# Patient Record
Sex: FEMALE | Race: WHITE | NOT HISPANIC OR LATINO | Employment: FULL TIME | URBAN - METROPOLITAN AREA
[De-identification: names, ages, dates, MRNs, and addresses within clinical notes are randomized per-mention and may not be internally consistent; named-entity substitution may affect disease eponyms.]

---

## 2017-01-17 ENCOUNTER — LAB VISIT (OUTPATIENT)
Dept: LAB | Facility: HOSPITAL | Age: 47
End: 2017-01-17
Attending: INTERNAL MEDICINE
Payer: COMMERCIAL

## 2017-01-17 DIAGNOSIS — D64.9 ANEMIA OF UNKNOWN ETIOLOGY: ICD-10-CM

## 2017-01-17 LAB
BASOPHILS # BLD AUTO: 0.02 K/UL
BASOPHILS NFR BLD: 0.3 %
DIFFERENTIAL METHOD: ABNORMAL
EOSINOPHIL # BLD AUTO: 0.3 K/UL
EOSINOPHIL NFR BLD: 4 %
ERYTHROCYTE [DISTWIDTH] IN BLOOD BY AUTOMATED COUNT: 13.4 %
FERRITIN SERPL-MCNC: 60 NG/ML
HCT VFR BLD AUTO: 35.8 %
HGB BLD-MCNC: 12.4 G/DL
IRON SERPL-MCNC: 67 UG/DL
LYMPHOCYTES # BLD AUTO: 2 K/UL
LYMPHOCYTES NFR BLD: 28.7 %
MCH RBC QN AUTO: 30 PG
MCHC RBC AUTO-ENTMCNC: 34.6 %
MCV RBC AUTO: 87 FL
MONOCYTES # BLD AUTO: 0.6 K/UL
MONOCYTES NFR BLD: 8 %
NEUTROPHILS # BLD AUTO: 4.1 K/UL
NEUTROPHILS NFR BLD: 59 %
PLATELET # BLD AUTO: 229 K/UL
PMV BLD AUTO: 11 FL
RBC # BLD AUTO: 4.13 M/UL
SATURATED IRON: 20 %
TOTAL IRON BINDING CAPACITY: 340 UG/DL
TRANSFERRIN SERPL-MCNC: 230 MG/DL
WBC # BLD AUTO: 7 K/UL

## 2017-01-17 PROCEDURE — 83540 ASSAY OF IRON: CPT

## 2017-01-17 PROCEDURE — 82728 ASSAY OF FERRITIN: CPT

## 2017-01-17 PROCEDURE — 85025 COMPLETE CBC W/AUTO DIFF WBC: CPT

## 2017-01-17 PROCEDURE — 84466 ASSAY OF TRANSFERRIN: CPT

## 2017-01-17 PROCEDURE — 36415 COLL VENOUS BLD VENIPUNCTURE: CPT

## 2017-01-18 ENCOUNTER — TELEPHONE (OUTPATIENT)
Dept: INTERNAL MEDICINE | Facility: CLINIC | Age: 47
End: 2017-01-18

## 2017-01-18 DIAGNOSIS — Z12.10 SPECIAL SCREENING FOR MALIGNANT NEOPLASMS, UNSPECIFIED INTESTINE: Primary | ICD-10-CM

## 2017-01-20 ENCOUNTER — PATIENT MESSAGE (OUTPATIENT)
Dept: INTERNAL MEDICINE | Facility: CLINIC | Age: 47
End: 2017-01-20

## 2017-02-01 ENCOUNTER — PATIENT MESSAGE (OUTPATIENT)
Dept: OPTOMETRY | Facility: CLINIC | Age: 47
End: 2017-02-01

## 2017-02-27 ENCOUNTER — TELEPHONE (OUTPATIENT)
Dept: OPHTHALMOLOGY | Facility: CLINIC | Age: 47
End: 2017-02-27

## 2017-02-27 NOTE — TELEPHONE ENCOUNTER
Called pt regarding sample contact lens left in  the optical shop, Answering machine  left message regarding,  can come in to .

## 2017-03-07 ENCOUNTER — PATIENT MESSAGE (OUTPATIENT)
Dept: INTERNAL MEDICINE | Facility: CLINIC | Age: 47
End: 2017-03-07

## 2017-03-08 ENCOUNTER — TELEPHONE (OUTPATIENT)
Dept: INTERNAL MEDICINE | Facility: CLINIC | Age: 47
End: 2017-03-08

## 2017-03-08 DIAGNOSIS — N60.02 CYST OF LEFT NIPPLE: Primary | ICD-10-CM

## 2017-03-15 ENCOUNTER — HOSPITAL ENCOUNTER (OUTPATIENT)
Dept: RADIOLOGY | Facility: HOSPITAL | Age: 47
Discharge: HOME | End: 2017-03-15
Attending: INTERNAL MEDICINE
Payer: COMMERCIAL

## 2017-03-15 DIAGNOSIS — N60.02 CYST OF LEFT NIPPLE: ICD-10-CM

## 2017-03-15 PROCEDURE — 76642 ULTRASOUND BREAST LIMITED: CPT | Mod: TC,LT

## 2017-03-15 PROCEDURE — 77061 BREAST TOMOSYNTHESIS UNI: CPT | Mod: 26,,, | Performed by: RADIOLOGY

## 2017-03-15 PROCEDURE — 77065 DX MAMMO INCL CAD UNI: CPT | Mod: 26,,, | Performed by: RADIOLOGY

## 2017-03-15 PROCEDURE — 76642 ULTRASOUND BREAST LIMITED: CPT | Mod: 26,LT,, | Performed by: RADIOLOGY

## 2017-03-15 PROCEDURE — 77061 BREAST TOMOSYNTHESIS UNI: CPT | Mod: TC,LT

## 2017-09-19 ENCOUNTER — TELEPHONE (OUTPATIENT)
Dept: INTERNAL MEDICINE | Facility: CLINIC | Age: 47
End: 2017-09-19

## 2017-09-19 DIAGNOSIS — I10 ESSENTIAL HYPERTENSION: Primary | ICD-10-CM

## 2017-09-19 NOTE — TELEPHONE ENCOUNTER
Please contact Dr. Nelson and set up lab  Appointment. Give her list of tests ordered and ask if she needs anything else.

## 2017-09-19 NOTE — TELEPHONE ENCOUNTER
----- Message from Nori Grove MA sent at 9/19/2017 10:13 AM CDT -----  Patient has an appt coming up and would like for you to put in lab orders and mammogram---

## 2017-09-20 ENCOUNTER — PATIENT MESSAGE (OUTPATIENT)
Dept: INTERNAL MEDICINE | Facility: CLINIC | Age: 47
End: 2017-09-20

## 2017-09-20 ENCOUNTER — TELEPHONE (OUTPATIENT)
Dept: INTERNAL MEDICINE | Facility: CLINIC | Age: 47
End: 2017-09-20

## 2017-09-20 ENCOUNTER — DOCUMENTATION ONLY (OUTPATIENT)
Dept: INTERNAL MEDICINE | Facility: CLINIC | Age: 47
End: 2017-09-20

## 2017-09-20 DIAGNOSIS — E03.8 SUBCLINICAL HYPOTHYROIDISM: Primary | ICD-10-CM

## 2017-09-20 NOTE — PROGRESS NOTES
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     At last visit, 11/29/16, BP was 128/76 on Coreg 3.125 mg tablet BID. She has been intolerant of Amlodipine 2.5 mg/day (headache), Lisinopril (cough), Losartan (fatigue), and Dyazide (37.5-25 mg) 1/4 tablet daily (excessive urination and dehydration).    She last saw her Gynecologist 10/15/15 at which time Apri was discontinued and IUD placed. She removed IUD accidentally and is no longer using birth control.  had Vasectomy in July, 2016.        PMH:      1. HTN             Coreg 3.125 mg BID           Intolerant of Amlodipine, Lisinopril, Losartan, and Dyazide    2. (B) Retinal Lattice Degeneration              Followed by Dr. Zazueta    4. Depression              Lexapro 10 mg - 1/2 tablet/day      5. Family Hx of Breast CA              Mother dx at 68 y/o - living              Mammogram 9/26/14 - normal      6. Hx of Alopecia Areata             Childhood possibly secondary to CMV - no recurrence     7. 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 Westbrook) - 10/2015          Mammogram - 10/2015 - negative           Immunizations:                Flu Vaccine - Fall 2016                  Td - 2005           Other Lab:                  TPO Antibodies (5/2012) - negative                  CRP (5/2012) - normal                  Quantitative Gold TB (8/2012): negative                  Mg (7/2015): normal                Ferritin (1/2017): 60                Sat'd Fe (1/2017): 20    IMP:  1. Annual PE  2. HTN   3. Depression  4. (B) Retinal Lattice Degeneration

## 2017-10-05 ENCOUNTER — LAB VISIT (OUTPATIENT)
Dept: LAB | Facility: HOSPITAL | Age: 47
End: 2017-10-05
Attending: INTERNAL MEDICINE
Payer: COMMERCIAL

## 2017-10-05 ENCOUNTER — TELEPHONE (OUTPATIENT)
Dept: INTERNAL MEDICINE | Facility: CLINIC | Age: 47
End: 2017-10-05

## 2017-10-05 DIAGNOSIS — E03.8 SUBCLINICAL HYPOTHYROIDISM: ICD-10-CM

## 2017-10-05 DIAGNOSIS — I10 ESSENTIAL HYPERTENSION: ICD-10-CM

## 2017-10-05 DIAGNOSIS — R74.8 LOW SERUM ALKALINE PHOSPHATASE: Primary | ICD-10-CM

## 2017-10-05 LAB
ALBUMIN SERPL BCP-MCNC: 4.2 G/DL
ALP SERPL-CCNC: 36 U/L
ALT SERPL W/O P-5'-P-CCNC: 19 U/L
ANION GAP SERPL CALC-SCNC: 9 MMOL/L
AST SERPL-CCNC: 23 U/L
BASOPHILS # BLD AUTO: 0.01 K/UL
BASOPHILS NFR BLD: 0.2 %
BILIRUB SERPL-MCNC: 0.6 MG/DL
BUN SERPL-MCNC: 16 MG/DL
CALCIUM SERPL-MCNC: 9.7 MG/DL
CHLORIDE SERPL-SCNC: 104 MMOL/L
CHOLEST SERPL-MCNC: 222 MG/DL
CHOLEST/HDLC SERPL: 2.6 {RATIO}
CO2 SERPL-SCNC: 26 MMOL/L
CREAT SERPL-MCNC: 0.8 MG/DL
DIFFERENTIAL METHOD: NORMAL
EOSINOPHIL # BLD AUTO: 0.1 K/UL
EOSINOPHIL NFR BLD: 2.2 %
ERYTHROCYTE [DISTWIDTH] IN BLOOD BY AUTOMATED COUNT: 12.7 %
EST. GFR  (AFRICAN AMERICAN): >60 ML/MIN/1.73 M^2
EST. GFR  (NON AFRICAN AMERICAN): >60 ML/MIN/1.73 M^2
GLUCOSE SERPL-MCNC: 85 MG/DL
HCT VFR BLD AUTO: 37.3 %
HDLC SERPL-MCNC: 87 MG/DL
HDLC SERPL: 39.2 %
HGB BLD-MCNC: 12.6 G/DL
LDLC SERPL CALC-MCNC: 117 MG/DL
LYMPHOCYTES # BLD AUTO: 1.7 K/UL
LYMPHOCYTES NFR BLD: 36.2 %
MCH RBC QN AUTO: 30 PG
MCHC RBC AUTO-ENTMCNC: 33.8 G/DL
MCV RBC AUTO: 89 FL
MONOCYTES # BLD AUTO: 0.3 K/UL
MONOCYTES NFR BLD: 7.3 %
NEUTROPHILS # BLD AUTO: 2.5 K/UL
NEUTROPHILS NFR BLD: 53.9 %
NONHDLC SERPL-MCNC: 135 MG/DL
PLATELET # BLD AUTO: 191 K/UL
PMV BLD AUTO: 10.6 FL
POTASSIUM SERPL-SCNC: 4.2 MMOL/L
PROT SERPL-MCNC: 7.4 G/DL
RBC # BLD AUTO: 4.2 M/UL
SODIUM SERPL-SCNC: 139 MMOL/L
T4 FREE SERPL-MCNC: 0.86 NG/DL
TRIGL SERPL-MCNC: 90 MG/DL
TSH SERPL DL<=0.005 MIU/L-ACNC: 4.33 UIU/ML
WBC # BLD AUTO: 4.64 K/UL

## 2017-10-05 PROCEDURE — 84443 ASSAY THYROID STIM HORMONE: CPT

## 2017-10-05 PROCEDURE — 84439 ASSAY OF FREE THYROXINE: CPT

## 2017-10-05 PROCEDURE — 80053 COMPREHEN METABOLIC PANEL: CPT

## 2017-10-05 PROCEDURE — 36415 COLL VENOUS BLD VENIPUNCTURE: CPT

## 2017-10-05 PROCEDURE — 80061 LIPID PANEL: CPT

## 2017-10-05 PROCEDURE — 85025 COMPLETE CBC W/AUTO DIFF WBC: CPT

## 2017-10-09 NOTE — PROGRESS NOTES
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    Chief Complaint: No chief complaint on file.    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     At last visit, 11/29/16, BP was 128/76 on Coreg 3.125 mg tablet that she takes only once daily at night. She has been intolerant of Amlodipine 2.5 mg/day (headache), Lisinopril (cough), Losartan (fatigue), and Dyazide (37.5-25 mg) 1/4 tablet daily (excessive urination and dehydration).    She last saw her Gynecologist 10/15/15 at which time Apri was discontinued and IUD placed. She removed IUD accidentally and is no longer using birth control.  had Vasectomy in July, 2016.    She has a history of Subclinical Hypothyroidism for several years. She has denied bradycardia, fatigue, weight gain, or constipation. TSH(10/2017) is 4.334 and Free T4 (10/2017) is 0.86.    In March 2017, a Diagnostic Mammogram and US was done for a possible mass in left breast that patient palpated. The results were negative and it is recommended she have a biopsy if nodule persisted or return to her routine Screening Mammogram 10/2017.        PMH:      1. HTN             Coreg 3.125 mg BID           Intolerant of Amlodipine, Lisinopril, Losartan, and Dyazide    2. (B) Retinal Lattice Degeneration              Followed by Dr. Zazueta    4. Depression              Lexapro 10 mg - 1/2 tablet/day      5. Family Hx of Breast CA              Mother dx at 68 y/o - living              Mammogram 9/26/14 - normal      6. Hx of Alopecia Areata             Childhood possibly secondary to CMV - no recurrence     7. 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 Westbrook) - 10/2015          Mammogram - 10/2015 - negative           Immunizations:                Flu Vaccine - Fall 2016                  Td - 2005           Other Lab:                  TPO Antibodies (5/2012) - negative                  CRP (5/2012) - normal                  Quantitative Gold TB (8/2012): negative                  Mg (7/2015): normal                Ferritin (1/2017): 60                Sat'd Fe (1/2017): 20              Review of Systems   Constitutional: Negative for appetite change and unexpected weight change.   HENT: Negative for trouble swallowing.    Respiratory: Negative for cough, chest tightness and shortness of breath.    Cardiovascular: Negative for chest pain, palpitations and leg swelling.   Gastrointestinal: Negative for abdominal pain, blood in stool, constipation, diarrhea, nausea and vomiting.   Genitourinary: Negative for dysuria, frequency, hematuria and urgency.        Mild stress incontinence, improved with pelvic floor exercises   Musculoskeletal: Negative.    Neurological: Negative for headaches.   Hematological: Negative for adenopathy.   Psychiatric/Behavioral: Negative for dysphoric mood.        Objective:      Physical Exam   Constitutional: She is oriented to person, place, and time. She appears well-developed and well-nourished.   HENT:   Head: Normocephalic.   Right Ear: External ear normal.   Left Ear: External ear normal.   Mouth/Throat: Oropharynx is clear and moist.   Eyes: No scleral icterus.   Neck: No thyromegaly present.   Cardiovascular: Normal rate and regular rhythm.  Exam reveals no gallop.    No murmur heard.  Pulmonary/Chest: Effort normal and breath sounds normal. She has no wheezes. She has no rales. Right breast exhibits no mass. Left breast exhibits no mass.   Abdominal: Soft. She exhibits no mass. There is no tenderness.   Musculoskeletal: She exhibits no edema.   Lymphadenopathy:     She has no cervical adenopathy.   Neurological: She is oriented to person, place, and time.   Skin: Skin is warm and dry.   Psychiatric: She has a normal mood and affect.       Assessment:   1. Annual PE  2. HTN   3. Hx of Depression - stable on Lexparo  4. (B) Retinal Lattice Degeneration  5. Family Hx of Breast CA  6. Subclinical Hypothyroidism  7. Mild Stress Incontinence  Plan:   1. Routine Screening Mammogram ordered. Flu Vaccine with [employer]  2. Patient to schedule routine GYN Exam and Follow up Eye Exam

## 2017-10-11 ENCOUNTER — OFFICE VISIT (OUTPATIENT)
Dept: INTERNAL MEDICINE | Facility: CLINIC | Age: 47
End: 2017-10-11
Payer: COMMERCIAL

## 2017-10-11 VITALS
WEIGHT: 138 LBS | OXYGEN SATURATION: 99 % | SYSTOLIC BLOOD PRESSURE: 122 MMHG | HEIGHT: 70 IN | DIASTOLIC BLOOD PRESSURE: 80 MMHG | BODY MASS INDEX: 19.76 KG/M2 | HEART RATE: 61 BPM

## 2017-10-11 DIAGNOSIS — H35.413 BILATERAL RETINAL LATTICE DEGENERATION: ICD-10-CM

## 2017-10-11 DIAGNOSIS — Z00.00 ANNUAL PHYSICAL EXAM: Primary | ICD-10-CM

## 2017-10-11 DIAGNOSIS — Z12.39 SCREENING FOR BREAST CANCER: ICD-10-CM

## 2017-10-11 DIAGNOSIS — Z80.3 FAMILY HISTORY OF BREAST CANCER: ICD-10-CM

## 2017-10-11 DIAGNOSIS — E03.8 SUBCLINICAL HYPOTHYROIDISM: ICD-10-CM

## 2017-10-11 DIAGNOSIS — F32.89 OTHER DEPRESSION: ICD-10-CM

## 2017-10-11 DIAGNOSIS — I10 ESSENTIAL HYPERTENSION: ICD-10-CM

## 2017-10-11 PROCEDURE — 99999 PR PBB SHADOW E&M-EST. PATIENT-LVL III: CPT | Mod: PBBFAC,,, | Performed by: INTERNAL MEDICINE

## 2017-10-11 PROCEDURE — 99396 PREV VISIT EST AGE 40-64: CPT | Mod: S$GLB,,, | Performed by: INTERNAL MEDICINE

## 2017-10-11 RX ORDER — CARVEDILOL 3.12 MG/1
3.12 TABLET ORAL 2 TIMES DAILY WITH MEALS
Qty: 180 TABLET | Refills: 4 | Status: SHIPPED | OUTPATIENT
Start: 2017-10-11 | End: 2018-12-13 | Stop reason: SDUPTHER

## 2017-10-11 RX ORDER — ESCITALOPRAM OXALATE 10 MG/1
10 TABLET ORAL DAILY
Qty: 90 TABLET | Refills: 4 | Status: SHIPPED | OUTPATIENT
Start: 2017-10-11 | End: 2018-12-12 | Stop reason: SDUPTHER

## 2017-10-30 ENCOUNTER — HOSPITAL ENCOUNTER (OUTPATIENT)
Dept: RADIOLOGY | Facility: HOSPITAL | Age: 47
Discharge: HOME | End: 2017-10-30
Attending: INTERNAL MEDICINE
Payer: COMMERCIAL

## 2017-10-30 VITALS — BODY MASS INDEX: 19.76 KG/M2 | HEIGHT: 70 IN | WEIGHT: 138 LBS

## 2017-10-30 DIAGNOSIS — Z12.39 SCREENING FOR BREAST CANCER: ICD-10-CM

## 2017-10-30 PROCEDURE — 77067 SCR MAMMO BI INCL CAD: CPT | Mod: TC

## 2017-10-30 PROCEDURE — 77063 BREAST TOMOSYNTHESIS BI: CPT | Mod: 26,,, | Performed by: RADIOLOGY

## 2017-10-30 PROCEDURE — 77067 SCR MAMMO BI INCL CAD: CPT | Mod: 26,,, | Performed by: RADIOLOGY

## 2017-11-03 ENCOUNTER — PATIENT MESSAGE (OUTPATIENT)
Dept: INTERNAL MEDICINE | Facility: CLINIC | Age: 47
End: 2017-11-03

## 2017-12-13 ENCOUNTER — OFFICE VISIT (OUTPATIENT)
Dept: SURGERY | Facility: CLINIC | Age: 47
End: 2017-12-13
Payer: COMMERCIAL

## 2017-12-13 VITALS
HEART RATE: 59 BPM | DIASTOLIC BLOOD PRESSURE: 73 MMHG | HEIGHT: 70 IN | WEIGHT: 136 LBS | BODY MASS INDEX: 19.47 KG/M2 | SYSTOLIC BLOOD PRESSURE: 127 MMHG | TEMPERATURE: 98 F

## 2017-12-13 DIAGNOSIS — Z80.3 FAMILY HISTORY OF BREAST CANCER: ICD-10-CM

## 2017-12-13 DIAGNOSIS — Z12.39 BREAST CANCER SCREENING, HIGH RISK PATIENT: Primary | ICD-10-CM

## 2017-12-13 PROCEDURE — 99999 PR PBB SHADOW E&M-EST. PATIENT-LVL III: CPT | Mod: PBBFAC,,, | Performed by: NURSE PRACTITIONER

## 2017-12-13 PROCEDURE — 99202 OFFICE O/P NEW SF 15 MIN: CPT | Mod: S$GLB,,, | Performed by: NURSE PRACTITIONER

## 2017-12-13 NOTE — LETTER
December 13, 2017      Joya Terry MD  [address]  [place] [address]           [facility]  [address] [address]  Phone: [phone]  Fax: [phone]          Patient: Jessica MD Omar   MR Number: [id number]   YOB: 1970   Date of Visit: 12/13/2017       Dear Dr. Joya Terry:    Thank you for referring Jessica Nelson to me for evaluation. Attached you will find relevant portions of my assessment and plan of care.    If you have questions, please do not hesitate to call me. I look forward to following Jessica Nelson along with you.    Sincerely,    KAYODE Guthrie    Enclosure  CC:  No Recipients    If you would like to receive this communication electronically, please contact [email or web address] or [phone] to request more information on [employer] access.    For providers and/or their staff who would like to refer a patient to [facility], please contact us through our one-stop-shop provider referral line, [facility], at [phone].    If you feel you have received this communication in error or would no longer like to receive these types of communications, please e-mail [email or web address]

## 2017-12-28 ENCOUNTER — OFFICE VISIT (OUTPATIENT)
Dept: OBSTETRICS AND GYNECOLOGY | Facility: CLINIC | Age: 47
End: 2017-12-28
Payer: COMMERCIAL

## 2017-12-28 VITALS
SYSTOLIC BLOOD PRESSURE: 110 MMHG | WEIGHT: 140.88 LBS | HEIGHT: 70 IN | DIASTOLIC BLOOD PRESSURE: 78 MMHG | BODY MASS INDEX: 20.17 KG/M2

## 2017-12-28 DIAGNOSIS — Z01.419 ENCOUNTER FOR GYNECOLOGICAL EXAMINATION WITHOUT ABNORMAL FINDING: Primary | ICD-10-CM

## 2017-12-28 DIAGNOSIS — Z80.3 FAMILY HISTORY OF BREAST CANCER: ICD-10-CM

## 2017-12-28 DIAGNOSIS — I10 HYPERTENSION, UNSPECIFIED TYPE: ICD-10-CM

## 2017-12-28 PROCEDURE — 99396 PREV VISIT EST AGE 40-64: CPT | Mod: S$GLB,,, | Performed by: OBSTETRICS & GYNECOLOGY

## 2017-12-28 PROCEDURE — 99999 PR PBB SHADOW E&M-EST. PATIENT-LVL III: CPT | Mod: PBBFAC,,, | Performed by: OBSTETRICS & GYNECOLOGY

## 2018-01-02 NOTE — PROGRESS NOTES
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  She has no GYN  issues, problems, or complaints.  She has light cycles.   NO need for birth control.      Past Medical History:   Diagnosis Date    Alopecia areata 1990    Resolved. Possibly secondary to CMV    Benign essential HTN 2015    Bilateral retinal lattice degeneration     Nodular fasciitis 2005    LUE    Subclinical hypothyroidism        Past Surgical History:   Procedure Laterality Date    Maxillofacial Surgery      Malocclusion    Nodule Excision      Benign Nodular Fasciitis LUE    RETINAL LASER PROCEDURE  2007    Lattice Degeneration Retina    TONSILLECTOMY         OB History    Para Term  AB Living   2 2 0     4   SAB TAB Ectopic Multiple Live Births           2      # Outcome Date GA Lbr Allen/2nd Weight Sex Delivery Anes PTL Lv   2 Para     M Vag-Spont  N DUNIA   1 Para     F Vag-Spont  N DUNIA          Family History   Problem Relation Age of Onset    Hypertension Mother     Hyperlipidemia Mother     Breast cancer Mother 67    Hypertension Father     Hyperlipidemia Father     Thyroid disease Neg Hx     Amblyopia Neg Hx     Blindness Neg Hx     Cancer Neg Hx     Cataracts Neg Hx     Diabetes Neg Hx     Glaucoma Neg Hx     Macular degeneration Neg Hx     Retinal detachment Neg Hx     Strabismus Neg Hx     Stroke Neg Hx        Social History   Substance Use Topics    Smoking status: Never Smoker    Smokeless tobacco: Never Used    Alcohol use Yes      Comment: Rare       /78   Ht 5' 10" (1.778 m)   Wt 63.9 kg (140 lb 14 oz)   LMP 11/15/2017 (Approximate)   BMI 20.21 kg/m²     ROS:  GENERAL: Denies weight gain or weight loss. Feeling well overall.   SKIN: Denies rash or lesions.   HEAD: Denies head injury or headache.   NODES: Denies enlarged lymph nodes.   CHEST: Denies chest pain or shortness of breath.   CARDIOVASCULAR: Denies " palpitations or left sided chest pain.   ABDOMEN: No abdominal pain, constipation, diarrhea, nausea, vomiting or rectal bleeding.   URINARY: No frequency, dysuria, hematuria, or burning on urination.  REPRODUCTIVE: See HPI.   BREASTS: The patient performs breast self-examination and denies pain, lumps, or nipple discharge.   HEMATOLOGIC: No easy bruisability or excessive bleeding.  MUSCULOSKELETAL: Denies joint pain or swelling.   NEUROLOGIC: Denies syncope or weakness.   PSYCHIATRIC: Denies depression, anxiety or mood swings.    Physical Exam:    APPEARANCE: Well nourished, well developed, in no acute distress.  AFFECT: WNL, alert and oriented x 3  SKIN: No acne or hirsutism  NECK: Neck symmetric without masses or thyromegaly  NODES: No inguinal, cervical, axillary, or femoral lymph node enlargement  CHEST: Good respiratory effect  ABDOMEN: Soft.  No tenderness or masses.  No hepatosplenomegaly.  No hernias.  BREASTS: Symmetrical, no skin changes or visible lesions.  No palpable masses, nipple discharge bilaterally.  PELVIC: Normal external genitalia without lesions.  Normal hair distribution.  Adequate perineal body, normal urethral meatus.  Vagina moist and well rugated without lesions or discharge.  Cervix pink, without lesions, discharge or tenderness.  No significant cystocele or rectocele.  Bimanual exam shows uterus to be normal size, regular, mobile and nontender.  Adnexa without masses or tenderness.    EXTREMITIES: No edema.    ASSESSMENT AND PLAN  1. Encounter for gynecological examination without abnormal finding     2. Family history of breast cancer     3. Hypertension, unspecified type       Following with Aylin Booker for possible MMR screening with [facility]  Patient was counseled today on A.C.S. Pap guidelines and recommendations for yearly pelvic exams, mammograms and monthly self breast exams; to see her PCP for other health maintenance.     F/U PRN

## 2018-07-25 ENCOUNTER — PATIENT MESSAGE (OUTPATIENT)
Dept: SURGERY | Facility: CLINIC | Age: 48
End: 2018-07-25

## 2018-07-26 DIAGNOSIS — N63.0 BREAST MASS: Primary | ICD-10-CM

## 2018-07-30 ENCOUNTER — HOSPITAL ENCOUNTER (OUTPATIENT)
Dept: RADIOLOGY | Facility: HOSPITAL | Age: 48
Discharge: HOME | End: 2018-07-30
Attending: NURSE PRACTITIONER
Payer: COMMERCIAL

## 2018-07-30 DIAGNOSIS — N63.0 BREAST MASS: ICD-10-CM

## 2018-07-30 PROCEDURE — 76642 ULTRASOUND BREAST LIMITED: CPT | Mod: 26,LT,, | Performed by: RADIOLOGY

## 2018-07-30 PROCEDURE — 77065 DX MAMMO INCL CAD UNI: CPT | Mod: TC,PO,LT

## 2018-07-30 PROCEDURE — 77061 BREAST TOMOSYNTHESIS UNI: CPT | Mod: TC,PO,LT

## 2018-07-30 PROCEDURE — 76642 ULTRASOUND BREAST LIMITED: CPT | Mod: TC,PO,LT

## 2018-07-30 PROCEDURE — 77065 DX MAMMO INCL CAD UNI: CPT | Mod: 26,LT,, | Performed by: RADIOLOGY

## 2018-07-30 PROCEDURE — 77061 BREAST TOMOSYNTHESIS UNI: CPT | Mod: 26,LT,, | Performed by: RADIOLOGY

## 2018-08-13 ENCOUNTER — PATIENT MESSAGE (OUTPATIENT)
Dept: SURGERY | Facility: CLINIC | Age: 48
End: 2018-08-13

## 2018-10-17 ENCOUNTER — TELEPHONE (OUTPATIENT)
Dept: INTERNAL MEDICINE | Facility: CLINIC | Age: 48
End: 2018-10-17

## 2018-10-17 DIAGNOSIS — Z00.00 ENCOUNTER FOR ANNUAL PHYSICAL EXAM: Primary | ICD-10-CM

## 2018-10-18 ENCOUNTER — LAB VISIT (OUTPATIENT)
Dept: LAB | Facility: HOSPITAL | Age: 48
End: 2018-10-18
Payer: COMMERCIAL

## 2018-10-18 DIAGNOSIS — Z00.00 ENCOUNTER FOR ANNUAL PHYSICAL EXAM: ICD-10-CM

## 2018-10-18 LAB
ALBUMIN SERPL BCP-MCNC: 4.3 G/DL
ALP SERPL-CCNC: 42 U/L
ALT SERPL W/O P-5'-P-CCNC: 20 U/L
ANION GAP SERPL CALC-SCNC: 6 MMOL/L
AST SERPL-CCNC: 24 U/L
BASOPHILS # BLD AUTO: 0.01 K/UL
BASOPHILS NFR BLD: 0.2 %
BILIRUB SERPL-MCNC: 0.4 MG/DL
BUN SERPL-MCNC: 16 MG/DL
CALCIUM SERPL-MCNC: 9.4 MG/DL
CHLORIDE SERPL-SCNC: 103 MMOL/L
CHOLEST SERPL-MCNC: 224 MG/DL
CHOLEST/HDLC SERPL: 2.4 {RATIO}
CO2 SERPL-SCNC: 27 MMOL/L
CREAT SERPL-MCNC: 0.8 MG/DL
DIFFERENTIAL METHOD: NORMAL
EOSINOPHIL # BLD AUTO: 0.1 K/UL
EOSINOPHIL NFR BLD: 2 %
ERYTHROCYTE [DISTWIDTH] IN BLOOD BY AUTOMATED COUNT: 12.5 %
EST. GFR  (AFRICAN AMERICAN): >60 ML/MIN/1.73 M^2
EST. GFR  (NON AFRICAN AMERICAN): >60 ML/MIN/1.73 M^2
FERRITIN SERPL-MCNC: 67 NG/ML
GLUCOSE SERPL-MCNC: 88 MG/DL
HCT VFR BLD AUTO: 37.8 %
HDLC SERPL-MCNC: 94 MG/DL
HDLC SERPL: 42 %
HGB BLD-MCNC: 12.3 G/DL
IRON SERPL-MCNC: 71 UG/DL
LDLC SERPL CALC-MCNC: 119.2 MG/DL
LYMPHOCYTES # BLD AUTO: 1.7 K/UL
LYMPHOCYTES NFR BLD: 34.6 %
MCH RBC QN AUTO: 29.6 PG
MCHC RBC AUTO-ENTMCNC: 32.5 G/DL
MCV RBC AUTO: 91 FL
MONOCYTES # BLD AUTO: 0.3 K/UL
MONOCYTES NFR BLD: 6.4 %
NEUTROPHILS # BLD AUTO: 2.8 K/UL
NEUTROPHILS NFR BLD: 56.8 %
NONHDLC SERPL-MCNC: 130 MG/DL
PLATELET # BLD AUTO: 209 K/UL
PMV BLD AUTO: 11.1 FL
POTASSIUM SERPL-SCNC: 4.4 MMOL/L
PROT SERPL-MCNC: 7.3 G/DL
RBC # BLD AUTO: 4.16 M/UL
SATURATED IRON: 21 %
SODIUM SERPL-SCNC: 136 MMOL/L
T4 FREE SERPL-MCNC: 0.87 NG/DL
TOTAL IRON BINDING CAPACITY: 339 UG/DL
TRANSFERRIN SERPL-MCNC: 229 MG/DL
TRIGL SERPL-MCNC: 54 MG/DL
TSH SERPL DL<=0.005 MIU/L-ACNC: 3.58 UIU/ML
WBC # BLD AUTO: 4.88 K/UL

## 2018-10-18 PROCEDURE — 83540 ASSAY OF IRON: CPT

## 2018-10-18 PROCEDURE — 84439 ASSAY OF FREE THYROXINE: CPT

## 2018-10-18 PROCEDURE — 80061 LIPID PANEL: CPT

## 2018-10-18 PROCEDURE — 85025 COMPLETE CBC W/AUTO DIFF WBC: CPT

## 2018-10-18 PROCEDURE — 36415 COLL VENOUS BLD VENIPUNCTURE: CPT

## 2018-10-18 PROCEDURE — 84443 ASSAY THYROID STIM HORMONE: CPT

## 2018-10-18 PROCEDURE — 82306 VITAMIN D 25 HYDROXY: CPT

## 2018-10-18 PROCEDURE — 82728 ASSAY OF FERRITIN: CPT

## 2018-10-18 PROCEDURE — 80053 COMPREHEN METABOLIC PANEL: CPT

## 2018-10-19 LAB — 25(OH)D3+25(OH)D2 SERPL-MCNC: 34 NG/ML

## 2018-10-31 ENCOUNTER — OFFICE VISIT (OUTPATIENT)
Dept: OPTOMETRY | Facility: CLINIC | Age: 48
End: 2018-10-31
Payer: COMMERCIAL

## 2018-10-31 DIAGNOSIS — H43.391 VITREOUS FLOATERS OF RIGHT EYE: Primary | ICD-10-CM

## 2018-10-31 DIAGNOSIS — Z98.890 HISTORY OF LASER PHOTOCOAGULATION OF RETINA: ICD-10-CM

## 2018-10-31 DIAGNOSIS — H35.413 BILATERAL RETINAL LATTICE DEGENERATION: ICD-10-CM

## 2018-10-31 PROCEDURE — 99999 PR PBB SHADOW E&M-EST. PATIENT-LVL III: CPT | Mod: PBBFAC,,, | Performed by: OPTOMETRIST

## 2018-10-31 PROCEDURE — 92004 COMPRE OPH EXAM NEW PT 1/>: CPT | Mod: S$GLB,,, | Performed by: OPTOMETRIST

## 2018-10-31 NOTE — LETTER
October 31, 2018      Joya Terry MD  [address]  [place] [address]           [facility]  [address]  [place] [address]  Phone: [phone]          Patient: Jessica MD Omar   MR Number: [id number]   YOB: 1970   Date of Visit: 10/31/2018       Dear Dr. Joya Terry:    Thank you for referring Jessica Nelson to me for evaluation. Attached you will find relevant portions of my assessment and plan of care.    If you have questions, please do not hesitate to call me. I look forward to following Jessica Nelson along with you.    Sincerely,    Daniela Pal, OD    Enclosure  CC:  No Recipients    If you would like to receive this communication electronically, please contact [email or web address] or [phone] to request more information on [employer] Link access.    For providers and/or their staff who would like to refer a patient to [facility], please contact us through our one-stop-shop provider referral line, [facility], at [phone].    If you feel you have received this communication in error or would no longer like to receive these types of communications, please e-mail [email or web address]

## 2018-10-31 NOTE — PROGRESS NOTES
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    She reports new floaters OD for the last 2-3 weeks, gradual onset.     Would patient like a refraction today? No (last refraction was about 10   months ago).    (-)drops  (-)flashes  (+)floaters OD (new floaters OD x 3 weeks)  (-)diplopia  (-)peripheral vision obscurations    Diabetic: no   No results found for: HGBA1C    OCULAR HISTORY  Last Eye Exam: [facility] December 2017  S/P Laser indirect OU (2007, 2009)   S/p Laser photocoagulation OD (2012)  Rhegmatogenous retinal detachment    Bilateral retinal lattice degeneration   Soft contact lens wearer    FAMILY HISTORY  (-)Glaucoma none         Last edited by Daniela Pal, OD on 10/31/2018  2:10 PM. (History)            Assessment /Plan     For exam results, see Encounter Report.    Vitreous floaters of right eye   No new retinal breaks/detachments. Reviewed signs and symptoms of a retinal detachment, pt understands to return to clinic immediately with any new floaters, flashes of light, or a veil over vision.  Monitor.    Bilateral retinal lattice degeneration  History of laser photocoagulation of retina   Previously managed by Dr. Zazueta (last seen in 2012). Recommended she re-establish care with retina at her convenience (scheduled for 12/06/18).      RTC 12/06/18 for retina consult

## 2018-11-20 ENCOUNTER — TELEPHONE (OUTPATIENT)
Dept: INTERNAL MEDICINE | Facility: CLINIC | Age: 48
End: 2018-11-20

## 2018-11-20 DIAGNOSIS — Z12.31 SCREENING MAMMOGRAM, ENCOUNTER FOR: Primary | ICD-10-CM

## 2018-12-06 ENCOUNTER — INITIAL CONSULT (OUTPATIENT)
Dept: OPHTHALMOLOGY | Facility: CLINIC | Age: 48
End: 2018-12-06
Payer: COMMERCIAL

## 2018-12-06 DIAGNOSIS — H35.413 LATTICE DEGENERATION OF BOTH RETINAS: ICD-10-CM

## 2018-12-06 DIAGNOSIS — H31.003 CHORIORETINAL SCAR, BOTH EYES: ICD-10-CM

## 2018-12-06 DIAGNOSIS — H33.321 RETINA HOLE, RIGHT: Primary | ICD-10-CM

## 2018-12-06 PROCEDURE — 92225 PR SPECIAL EYE EXAM, INITIAL: CPT | Mod: LT,S$GLB,, | Performed by: OPHTHALMOLOGY

## 2018-12-06 PROCEDURE — 92014 COMPRE OPH EXAM EST PT 1/>: CPT | Mod: S$GLB,,, | Performed by: OPHTHALMOLOGY

## 2018-12-06 PROCEDURE — 99999 PR PBB SHADOW E&M-EST. PATIENT-LVL III: CPT | Mod: PBBFAC,,, | Performed by: OPHTHALMOLOGY

## 2018-12-06 NOTE — PROGRESS NOTES
HPI     DLS: 10/31/18---Dr Pal--referred for new symptoms as to reestablish   retina care    Pt states that VA is stable OU  No eye pain   No flashes OU  +longstanding floaters--- but had 1 noticeable gradual onset larger   floater in the right eye that started about 10 weeks ago.  No sig change   since onset.  1. Bilateral lattice degeneration   S/P Laser indirect OU (12/9/09)    Last edited by Israel Mayfield MD on 12/9/2018  4:27 PM. (History)            Assessment /Plan     For exam results, see Encounter Report.    Retina hole, right    Lattice degeneration of both retinas    Chorioretinal scar, both eyes           Peripheral pathology stable OU after retinopexy- Observe    Pathology of PVD, Retinal Tear, Retinal Detachment reviewed in great detail  RD precautions discussed in detail, patient expressed understanding  RTC 1 yr, sooner PRN (especially ANY change flashes, floaters, vision, visual field)

## 2018-12-06 NOTE — LETTER
December 9, 2018      Daniela Pal, OD  [address] [facility] [facility] [address]           [facility]  [address]  [place] [address]  Phone: [phone]  Fax: [phone]          Patient: Jessica MD Omar   MR Number: [id number]   YOB: 1970   Date of Visit: 12/6/2018       Dear Dr. Daniela Pal:    Thank you for referring Jessica Nelson to me for evaluation. Attached you will find relevant portions of my assessment and plan of care.    If you have questions, please do not hesitate to call me. I look forward to following Jessica Nelson along with you.    Sincerely,    Israel Mayfield MD    Enclosure  CC:  No Recipients    If you would like to receive this communication electronically, please contact [email or web address] or [phone] to request more information on [employer] Link access.    For providers and/or their staff who would like to refer a patient to [facility], please contact us through our one-stop-shop provider referral line, [facility], at [phone].    If you feel you have received this communication in error or would no longer like to receive these types of communications, please e-mail [email or web address]

## 2018-12-12 ENCOUNTER — TELEPHONE (OUTPATIENT)
Dept: INTERNAL MEDICINE | Facility: CLINIC | Age: 48
End: 2018-12-12

## 2018-12-12 DIAGNOSIS — F32.89 OTHER DEPRESSION: ICD-10-CM

## 2018-12-12 RX ORDER — ESCITALOPRAM OXALATE 10 MG/1
10 TABLET ORAL DAILY
Qty: 90 TABLET | Refills: 4 | Status: SHIPPED | OUTPATIENT
Start: 2018-12-12 | End: 2020-01-13

## 2018-12-13 ENCOUNTER — OFFICE VISIT (OUTPATIENT)
Dept: INTERNAL MEDICINE | Facility: CLINIC | Age: 48
End: 2018-12-13
Payer: COMMERCIAL

## 2018-12-13 VITALS
HEIGHT: 70 IN | SYSTOLIC BLOOD PRESSURE: 108 MMHG | WEIGHT: 140.63 LBS | BODY MASS INDEX: 20.13 KG/M2 | HEART RATE: 76 BPM | DIASTOLIC BLOOD PRESSURE: 80 MMHG

## 2018-12-13 DIAGNOSIS — Z00.00 ENCOUNTER FOR PREVENTIVE HEALTH EXAMINATION: Primary | ICD-10-CM

## 2018-12-13 DIAGNOSIS — E03.8 SUBCLINICAL HYPOTHYROIDISM: ICD-10-CM

## 2018-12-13 DIAGNOSIS — H35.413 BILATERAL RETINAL LATTICE DEGENERATION: ICD-10-CM

## 2018-12-13 DIAGNOSIS — I83.91 VARICOSE VEINS OF RIGHT LOWER EXTREMITY, UNSPECIFIED WHETHER COMPLICATED: ICD-10-CM

## 2018-12-13 DIAGNOSIS — Z80.3 FAMILY HISTORY OF BREAST CANCER: ICD-10-CM

## 2018-12-13 DIAGNOSIS — I10 ESSENTIAL HYPERTENSION: ICD-10-CM

## 2018-12-13 PROCEDURE — 3074F SYST BP LT 130 MM HG: CPT | Mod: CPTII,S$GLB,, | Performed by: PHYSICIAN ASSISTANT

## 2018-12-13 PROCEDURE — 3079F DIAST BP 80-89 MM HG: CPT | Mod: CPTII,S$GLB,, | Performed by: PHYSICIAN ASSISTANT

## 2018-12-13 PROCEDURE — 99396 PREV VISIT EST AGE 40-64: CPT | Mod: S$GLB,,, | Performed by: PHYSICIAN ASSISTANT

## 2018-12-13 PROCEDURE — 99999 PR PBB SHADOW E&M-EST. PATIENT-LVL III: CPT | Mod: PBBFAC,,, | Performed by: PHYSICIAN ASSISTANT

## 2018-12-13 RX ORDER — CARVEDILOL 3.12 MG/1
3.12 TABLET ORAL 2 TIMES DAILY WITH MEALS
Qty: 180 TABLET | Refills: 4 | Status: SHIPPED | OUTPATIENT
Start: 2018-12-13 | End: 2021-06-02 | Stop reason: SDUPTHER

## 2018-12-13 NOTE — PROGRESS NOTES
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        Chief Complaint: Annual Exam    Jessica Nelson MD is an established patient of Joya Terry MD (Inactive) here today for annual exam.    Varicose veins: RLE for several years, one episode of phlebitis that resolved, now wearing compression stockings, which are helping.    HTN: on coreg 3.125 usually only nightly, sometimes BID, previously intolerant of amlodipine 2.5 mg/day (headache), lisinopril (cough), losartan (fatigue), and dyazide (37.5-25 mg) 1/4 tablet daily (excessive urination and dehydration), blood pressure well controlled on current regimen and much better since stopping OCP.    History of subclinical hypothyroidism: recent TSH normal, asx.    Retinal lattice degeneration: seen by Dr. Mayfield 12/6/18.    Depression: stable on lexapro 10 mg - 1/2 tablet/day.     Family history of breast cancer: mother dx at age 67, due for mammogram and order already in to schedule.           Review of Systems   Constitutional: Negative for chills, diaphoresis, fatigue and fever.   HENT: Negative for congestion and sore throat.    Eyes: Negative for visual disturbance.   Respiratory: Negative for cough, chest tightness and shortness of breath.    Cardiovascular: Negative for chest pain, palpitations and leg swelling.   Gastrointestinal: Negative for abdominal pain, blood in stool, constipation, diarrhea, nausea and vomiting.   Genitourinary: Negative for dysuria, frequency, hematuria and urgency.   Musculoskeletal: Negative for arthralgias and back pain.   Skin: Negative for rash.   Neurological: Negative for dizziness, syncope, weakness and headaches.   Psychiatric/Behavioral: Negative for dysphoric mood and sleep disturbance. The patient is not nervous/anxious.        Objective:      Physical Exam   Constitutional: She appears well-developed and well-nourished.   HENT:   Head: Normocephalic.   Right Ear: External ear normal.   Left  "Ear: External ear normal.   Mouth/Throat: Oropharynx is clear and moist.   Eyes: Pupils are equal, round, and reactive to light.   Cardiovascular: Normal rate, regular rhythm and normal heart sounds. Exam reveals no gallop and no friction rub.   No murmur heard.  Pulmonary/Chest: Effort normal and breath sounds normal. No respiratory distress.   Abdominal: Soft. Normal appearance. There is no tenderness.   Musculoskeletal: She exhibits no edema.   Neurological: She is alert.   Skin: Skin is warm and dry.   Psychiatric: She has a normal mood and affect.   Nursing note and vitals reviewed.      Assessment:       1. Encounter for preventive health examination    2. Essential hypertension    3. Varicose veins of right lower extremity, unspecified whether complicated    4. Bilateral retinal lattice degeneration    5. Family history of breast cancer    6. Subclinical hypothyroidism        Plan:       Jessica was seen today for annual exam.    Diagnoses and all orders for this visit:    Encounter for preventive health examination: pre-visit labs reviewed and acceptable, s/p flu shot    Essential hypertension: stable and controlled  -     REFILL: carvedilol (COREG) 3.125 MG tablet; Take 1 tablet (3.125 mg total) by mouth 2 (two) times daily with meals.    Varicose veins of right lower extremity, unspecified whether complicated  -     Ambulatory consult to Vascular Surgery    Bilateral retinal lattice degeneration: saw Dr. Mayfield recently    Family history of breast cancer: mammogram order in, due, will schedule    Subclinical hypothyroidism: recent TSH normal    Pt has been given instructions populated from [employer] database and has verbalized understanding of the after visit summary and information contained wherein.    Follow up with a primary care provider. May go to ER for acute shortness of breath, lightheadedness, fever, or any other emergent complaints or changes in condition.    "This note will be shared with the " "patient"    No future appointments.            "

## 2019-02-01 ENCOUNTER — HOSPITAL ENCOUNTER (OUTPATIENT)
Dept: RADIOLOGY | Facility: HOSPITAL | Age: 49
Discharge: HOME | End: 2019-02-01
Attending: PHYSICIAN ASSISTANT
Payer: COMMERCIAL

## 2019-02-01 DIAGNOSIS — Z12.31 SCREENING MAMMOGRAM, ENCOUNTER FOR: ICD-10-CM

## 2019-02-01 PROCEDURE — 77067 MAMMO DIGITAL SCREENING BILAT WITH TOMOSYNTHESIS_CAD: ICD-10-PCS | Mod: 26,,, | Performed by: RADIOLOGY

## 2019-02-01 PROCEDURE — 77067 SCR MAMMO BI INCL CAD: CPT | Mod: TC

## 2019-02-01 PROCEDURE — 77063 BREAST TOMOSYNTHESIS BI: CPT | Mod: 26,,, | Performed by: RADIOLOGY

## 2019-02-01 PROCEDURE — 77067 SCR MAMMO BI INCL CAD: CPT | Mod: 26,,, | Performed by: RADIOLOGY

## 2019-02-01 PROCEDURE — 77063 MAMMO DIGITAL SCREENING BILAT WITH TOMOSYNTHESIS_CAD: ICD-10-PCS | Mod: 26,,, | Performed by: RADIOLOGY

## 2019-02-04 ENCOUNTER — TELEPHONE (OUTPATIENT)
Dept: RADIOLOGY | Facility: HOSPITAL | Age: 49
End: 2019-02-04

## 2019-02-04 ENCOUNTER — HOSPITAL ENCOUNTER (OUTPATIENT)
Dept: RADIOLOGY | Facility: HOSPITAL | Age: 49
Discharge: HOME | End: 2019-02-04
Attending: PHYSICIAN ASSISTANT
Payer: COMMERCIAL

## 2019-02-04 VITALS — HEIGHT: 70 IN | BODY MASS INDEX: 19.76 KG/M2 | WEIGHT: 138 LBS

## 2019-02-04 DIAGNOSIS — R92.8 ABNORMAL MAMMOGRAM: ICD-10-CM

## 2019-02-04 PROCEDURE — 77065 DX MAMMO INCL CAD UNI: CPT | Mod: 26,,, | Performed by: RADIOLOGY

## 2019-02-04 PROCEDURE — 77061 MAMMO DIGITAL DIAGNOSTIC RIGHT WITH TOMOSYNTHESIS_CAD: ICD-10-PCS | Mod: 26,,, | Performed by: RADIOLOGY

## 2019-02-04 PROCEDURE — 77065 DX MAMMO INCL CAD UNI: CPT | Mod: TC,PO

## 2019-02-04 PROCEDURE — 77065 MAMMO DIGITAL DIAGNOSTIC RIGHT WITH TOMOSYNTHESIS_CAD: ICD-10-PCS | Mod: 26,,, | Performed by: RADIOLOGY

## 2019-02-04 PROCEDURE — 77061 BREAST TOMOSYNTHESIS UNI: CPT | Mod: 26,,, | Performed by: RADIOLOGY

## 2019-02-04 PROCEDURE — 77061 BREAST TOMOSYNTHESIS UNI: CPT | Mod: TC,PO

## 2019-02-04 NOTE — TELEPHONE ENCOUNTER
Spoke with patient and explained mammogram findings.Patient expressed understanding of results. Patient is scheduled for a abnormal mammogram follow up appointment at [facility] on 2/4/2019

## 2019-02-04 NOTE — TELEPHONE ENCOUNTER
Called patient in reference to her mammogram and scheduling a abnormal mammogram follow up. No answer. Left the patient a message with my direct phone number.

## 2019-03-07 DIAGNOSIS — I87.2 VENOUS INSUFFICIENCY OF BOTH LOWER EXTREMITIES: Primary | ICD-10-CM

## 2019-03-14 ENCOUNTER — HOSPITAL ENCOUNTER (OUTPATIENT)
Dept: VASCULAR SURGERY | Facility: CLINIC | Age: 49
Discharge: HOME | End: 2019-03-14
Attending: SURGERY
Payer: COMMERCIAL

## 2019-03-14 ENCOUNTER — OFFICE VISIT (OUTPATIENT)
Dept: VASCULAR SURGERY | Facility: CLINIC | Age: 49
End: 2019-03-14
Payer: COMMERCIAL

## 2019-03-14 VITALS
SYSTOLIC BLOOD PRESSURE: 114 MMHG | TEMPERATURE: 98 F | HEART RATE: 107 BPM | BODY MASS INDEX: 19.57 KG/M2 | DIASTOLIC BLOOD PRESSURE: 77 MMHG | WEIGHT: 136.69 LBS | HEIGHT: 70 IN

## 2019-03-14 DIAGNOSIS — I87.2 VENOUS INSUFFICIENCY OF BOTH LOWER EXTREMITIES: ICD-10-CM

## 2019-03-14 DIAGNOSIS — I87.2 VENOUS INSUFFICIENCY OF BOTH LOWER EXTREMITIES: Primary | ICD-10-CM

## 2019-03-14 PROCEDURE — 93970 PR US DUPLEX, UPPER OR LOWER EXT VENOUS,COMPLETE BILAT: ICD-10-PCS | Mod: S$GLB,,, | Performed by: SURGERY

## 2019-03-14 PROCEDURE — 99999 PR PBB SHADOW E&M-EST. PATIENT-LVL III: ICD-10-PCS | Mod: PBBFAC,,, | Performed by: SURGERY

## 2019-03-14 PROCEDURE — 3078F DIAST BP <80 MM HG: CPT | Mod: CPTII,S$GLB,, | Performed by: SURGERY

## 2019-03-14 PROCEDURE — 99999 PR PBB SHADOW E&M-EST. PATIENT-LVL III: CPT | Mod: PBBFAC,,, | Performed by: SURGERY

## 2019-03-14 PROCEDURE — 99203 PR OFFICE/OUTPT VISIT, NEW, LEVL III, 30-44 MIN: ICD-10-PCS | Mod: S$GLB,,, | Performed by: SURGERY

## 2019-03-14 PROCEDURE — 3074F SYST BP LT 130 MM HG: CPT | Mod: CPTII,S$GLB,, | Performed by: SURGERY

## 2019-03-14 PROCEDURE — 93970 EXTREMITY STUDY: CPT | Mod: S$GLB,,, | Performed by: SURGERY

## 2019-03-14 PROCEDURE — 99203 OFFICE O/P NEW LOW 30 MIN: CPT | Mod: S$GLB,,, | Performed by: SURGERY

## 2019-03-14 PROCEDURE — 3008F PR BODY MASS INDEX (BMI) DOCUMENTED: ICD-10-PCS | Mod: CPTII,S$GLB,, | Performed by: SURGERY

## 2019-03-14 PROCEDURE — 3008F BODY MASS INDEX DOCD: CPT | Mod: CPTII,S$GLB,, | Performed by: SURGERY

## 2019-03-14 PROCEDURE — 3078F PR MOST RECENT DIASTOLIC BLOOD PRESSURE < 80 MM HG: ICD-10-PCS | Mod: CPTII,S$GLB,, | Performed by: SURGERY

## 2019-03-14 PROCEDURE — 3074F PR MOST RECENT SYSTOLIC BLOOD PRESSURE < 130 MM HG: ICD-10-PCS | Mod: CPTII,S$GLB,, | Performed by: SURGERY

## 2019-03-14 NOTE — PATIENT INSTRUCTIONS
Endovenous Laser Treatment (EVLT) for Varicose Veins  Endovenous laser treatment (EVLT) is a procedure that uses laser heat to treat varicose veins.   Varicose veins are swollen and enlarged veins. They occur most often in the legs. Varicose veins can develop when valves in your veins become damaged. This causes problems with blood flow. Over time, too much blood collects in the veins. The veins may bulge, twist, and stand out under your skin. They can also cause symptoms such as aching, cramping, or swelling in your legs.  During EVLT, heat created using a laser is sent into the vein through a thin, flexible tube (catheter). This closes off blood flow in the main problem vein.  Getting ready for your treatment  Follow any instructions from your healthcare provider.  Tell your healthcare provider if you:  · Are pregnant or think you may be pregnant  · Are breastfeeding  · Smoke or use alcohol on a regular basis  · Have other health problems, such as diabetes or kidney problems  Tell your provider about any medicines you are taking. You may need to stop taking all or some of these before the procedure. This includes:  · Medicines that can thin your blood or prevent clotting (anticoagulants)  · All prescription medicines  · Over-the-counter medicines such as aspirin or ibuprofen  · Street drugs  · Herbs, vitamins, and other supplements  Follow any directions youre given for not eating or drinking before the treatment.  The day of your treatment    The treatment takes 45 to 60 minutes. The entire treatment (including time to prepare and recover) takes about 1 to 3 hours. You can go home the same day. For the treatment:  · Youll lie down on a hospital bed.  · An imaging method, such as ultrasound, is used to guide the procedure.  · The leg to be treated is injected with numbing medicine.  · Once your leg is numb, a needle makes a small hole (puncture) in the vein to be treated.  · The catheter containing the laser  heat source is inserted into your vein.  · More numbing medicine may be injected around the vein.  · Once the catheter is in the right position, it is then slowly drawn backward. As the catheter sends out heat, the vein is closed off.  · In some cases, other side branch varicose veins may be removed or tied off through several small cuts (incisions).  · When the treatment is done, the catheter is removed. Pressure is applied to the insertion site to stop any bleeding. An elastic compression stocking or a bandage may then be put on your leg.  Recovering at home  Once at home, follow all the instructions youve been given. Be sure to:  · Take all medicines as directed  · Care for the catheter insertion site as directed  · Check for signs of infection at the catheter insertion site (see below)  · Wear elastic stockings or bandages as directed  · Keep your legs raised (elevated) as directed  · Walk a few times a day  · Avoid heavy exercise, lifting, and standing for long periods as advised  · Avoid air travel, hot baths, saunas, or whirlpools as advised  Call your healthcare provider  Call your healthcare provider if you have any of the following:  · Fever of 100.4°F (38°C) or higher, or as directed by your provider  · Chest pain or trouble breathing  · Signs of infection at the catheter insertion site. These include greater redness or swelling (inflammation), warmth, increasing pain, bleeding, or bad-smelling discharge.  · Severe numbness or tingling in the treated leg  · Severe pain or swelling in the treated leg   Follow-up  Youll have a follow-up visit with your healthcare provider within a week. An ultrasound will likely be done to check for problems, such as blood clots. Your provider will discuss more treatments with you, if needed.   Risk and possible complications  These include:  · Bleeding  · Infection  · Blood clots  · Damage to the nerves in the treated area  · Irritation or burning of the skin over the  treated vein  · Treatment doesn't improve the look or the symptoms of the problem veins  · Risks of any medicines used during the treatment   Date Last Reviewed: 5/1/2016 © 2000-2017 [employer]. [address] [place] [address], [place] [address]. All rights reserved. This information is not intended as a substitute for professional medical care. Always follow your healthcare professional's instructions.          Understanding Chronic Venous Insufficiency  Problems with the veins in the legs may lead to chronic venous insufficiency (CVI). CVI means that there is a long-term problem with the veins not being able to pump blood back to your heart. When this happens, blood stays in the legs and causes swelling and aching.   Two problems that may lead to chronic venous insufficiency are:  · Damaged valves. Valves keep blood flowing from the legs through the blood vessels and back to the heart. When the valves are damaged, blood does not flow as well.   · Deep vein thrombosis (DVT). Blood clots may form in the deep veins of the legs. This may cause pain, redness, and swelling in the legs. It may also block the flow of blood back to the heart. Seek immediate medical care if you have these symptoms.  · A blood clot in the leg can also break off and travel to the lungs. This is called pulmonary embolism (PE). In the lungs, the clot can cut off the flow of blood. This may cause chest pain, trouble breathing, sweating, a fast heartbeat, coughing (may cough up blood), and fainting. It is a medical emergency and may cause death. Call 911 if you have these symptoms.  · Healthcare providers call the two conditions, DVT and PE, venous thromboembolism (VTE).  CVI cant be cured, but you can control leg swelling to reduce the likelihood of ulcers (sores).  Recognizing the symptoms  Be aware of the following:  · If you stand or sit with your feet down for long periods, your legs may ache or feel heavy.  · Swollen ankles are  possibly the most common symptom of CVI.  · As swelling increases, the skin over your ankles may show red spots or a brownish tinge. The skin may feel leathery or scaly, and may start to itch.  · If swelling is not controlled, an ulcer (open wound) may form.  What you can do  Reduce your risk of developing ulcers by doing the following:  · Increase blood flow back to your heart by elevating your legs, exercising daily, and wearing elastic stockings.  · Boost blood flow in your legs by losing excess weight.  · If you must stand or sit in one place for a period of time, keep your blood moving by wiggling your toes, shifting your body position, and rising up on the balls of your feet.    Date Last Reviewed: 5/1/2016  © 8695-2133 [employer]. [address] [place] [address], [place] [address]. All rights reserved. This information is not intended as a substitute for professional medical care. Always follow your healthcare professional's instructions.

## 2019-03-14 NOTE — LETTER
March 14, 2019      Malena Rehman PA-C  [address]  [place] [address]           [facility]  [address]  [facility] [address]  Phone: [phone]  Fax: [phone]          Patient: Jessica MD Omar   MR Number: [id number]   YOB: 1970   Date of Visit: 3/14/2019       Dear Malena Rehman:    Thank you for referring Jessica Nelson to me for evaluation. Attached you will find relevant portions of my assessment and plan of care.    If you have questions, please do not hesitate to call me. I look forward to following Jessica Nelson along with you.    Sincerely,    Félix Orellana MD    Enclosure  CC:  No Recipients    If you would like to receive this communication electronically, please contact [email or web address] or [phone] to request more information on [employer] access.    For providers and/or their staff who would like to refer a patient to [facility], please contact us through our one-stop-shop provider referral line, [facility], at [phone].    If you feel you have received this communication in error or would no longer like to receive these types of communications, please e-mail [email or web address]
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
## 2019-08-08 ENCOUNTER — PATIENT MESSAGE (OUTPATIENT)
Dept: VASCULAR SURGERY | Facility: CLINIC | Age: 49
End: 2019-08-08

## 2019-08-09 DIAGNOSIS — I87.2 VENOUS INSUFFICIENCY: Primary | ICD-10-CM

## 2019-09-19 ENCOUNTER — OFFICE VISIT (OUTPATIENT)
Dept: VASCULAR SURGERY | Facility: CLINIC | Age: 49
End: 2019-09-19
Payer: COMMERCIAL

## 2019-09-19 ENCOUNTER — HOSPITAL ENCOUNTER (OUTPATIENT)
Dept: VASCULAR SURGERY | Facility: CLINIC | Age: 49
Discharge: HOME | End: 2019-09-19
Attending: SURGERY
Payer: COMMERCIAL

## 2019-09-19 VITALS
TEMPERATURE: 98 F | WEIGHT: 138.88 LBS | HEIGHT: 70 IN | BODY MASS INDEX: 19.88 KG/M2 | DIASTOLIC BLOOD PRESSURE: 81 MMHG | HEART RATE: 57 BPM | SYSTOLIC BLOOD PRESSURE: 126 MMHG

## 2019-09-19 DIAGNOSIS — I87.2 VENOUS INSUFFICIENCY: Primary | ICD-10-CM

## 2019-09-19 DIAGNOSIS — I87.2 VENOUS INSUFFICIENCY: ICD-10-CM

## 2019-09-19 PROCEDURE — 3008F PR BODY MASS INDEX (BMI) DOCUMENTED: ICD-10-PCS | Mod: CPTII,S$GLB,, | Performed by: SURGERY

## 2019-09-19 PROCEDURE — 3074F PR MOST RECENT SYSTOLIC BLOOD PRESSURE < 130 MM HG: ICD-10-PCS | Mod: CPTII,S$GLB,, | Performed by: SURGERY

## 2019-09-19 PROCEDURE — 3008F BODY MASS INDEX DOCD: CPT | Mod: CPTII,S$GLB,, | Performed by: SURGERY

## 2019-09-19 PROCEDURE — 99999 PR PBB SHADOW E&M-EST. PATIENT-LVL III: ICD-10-PCS | Mod: PBBFAC,,, | Performed by: SURGERY

## 2019-09-19 PROCEDURE — 99214 PR OFFICE/OUTPT VISIT, EST, LEVL IV, 30-39 MIN: ICD-10-PCS | Mod: S$GLB,,, | Performed by: SURGERY

## 2019-09-19 PROCEDURE — 3079F DIAST BP 80-89 MM HG: CPT | Mod: CPTII,S$GLB,, | Performed by: SURGERY

## 2019-09-19 PROCEDURE — 3074F SYST BP LT 130 MM HG: CPT | Mod: CPTII,S$GLB,, | Performed by: SURGERY

## 2019-09-19 PROCEDURE — 93971 PR US DUPLEX, UPPER OR LOWER EXT VENOUS,UNILAT OR LTD: ICD-10-PCS | Mod: S$GLB,,, | Performed by: SURGERY

## 2019-09-19 PROCEDURE — 99999 PR PBB SHADOW E&M-EST. PATIENT-LVL III: CPT | Mod: PBBFAC,,, | Performed by: SURGERY

## 2019-09-19 PROCEDURE — 3079F PR MOST RECENT DIASTOLIC BLOOD PRESSURE 80-89 MM HG: ICD-10-PCS | Mod: CPTII,S$GLB,, | Performed by: SURGERY

## 2019-09-19 PROCEDURE — 93971 EXTREMITY STUDY: CPT | Mod: S$GLB,,, | Performed by: SURGERY

## 2019-09-19 PROCEDURE — 99214 OFFICE O/P EST MOD 30 MIN: CPT | Mod: S$GLB,,, | Performed by: SURGERY

## 2019-09-19 RX ORDER — MELOXICAM 7.5 MG/1
7.5 TABLET ORAL 2 TIMES DAILY
Qty: 10 TABLET | Refills: 0 | Status: SHIPPED | OUTPATIENT
Start: 2019-09-19 | End: 2019-09-25

## 2019-09-19 RX ORDER — ALPRAZOLAM 0.5 MG/1
0.5 TABLET ORAL ONCE AS NEEDED
Qty: 2 TABLET | Refills: 0 | Status: SHIPPED | OUTPATIENT
Start: 2019-09-19 | End: 2019-09-19

## 2019-09-19 NOTE — PATIENT INSTRUCTIONS
Understanding Chronic Venous Insufficiency  Problems with the veins in the legs may lead to chronic venous insufficiency (CVI). CVI means that there is a long-term problem with the veins not being able to pump blood back to your heart. When this happens, blood stays in the legs and causes swelling and aching.   Two problems that may lead to chronic venous insufficiency are:  · Damaged valves. Valves keep blood flowing from the legs through the blood vessels and back to the heart. When the valves are damaged, blood does not flow as well.   · Deep vein thrombosis (DVT). Blood clots may form in the deep veins of the legs. This may cause pain, redness, and swelling in the legs. It may also block the flow of blood back to the heart. Seek immediate medical care if you have these symptoms.  · A blood clot in the leg can also break off and travel to the lungs. This is called pulmonary embolism (PE). In the lungs, the clot can cut off the flow of blood. This may cause chest pain, trouble breathing, sweating, a fast heartbeat, coughing (may cough up blood), and fainting. It is a medical emergency and may cause death. Call 911 if you have these symptoms.  · Healthcare providers call the two conditions, DVT and PE, venous thromboembolism (VTE).  CVI cant be cured, but you can control leg swelling to reduce the likelihood of ulcers (sores).  Recognizing the symptoms  Be aware of the following:  · If you stand or sit with your feet down for long periods, your legs may ache or feel heavy.  · Swollen ankles are possibly the most common symptom of CVI.  · As swelling increases, the skin over your ankles may show red spots or a brownish tinge. The skin may feel leathery or scaly, and may start to itch.  · If swelling is not controlled, an ulcer (open wound) may form.  What you can do  Reduce your risk of developing ulcers by doing the following:  · Increase blood flow back to your heart by elevating your legs, exercising daily,  and wearing elastic stockings.  · Boost blood flow in your legs by losing excess weight.  · If you must stand or sit in one place for a period of time, keep your blood moving by wiggling your toes, shifting your body position, and rising up on the balls of your feet.    Date Last Reviewed: 5/1/2016 © 2000-2017 [employer]. [address] [place] [address]. All rights reserved. This information is not intended as a substitute for professional medical care. Always follow your healthcare professional's instructions.          Endovenous Laser Treatment (EVLT) for Varicose Veins  Endovenous laser treatment (EVLT) is a procedure that uses laser heat to treat varicose veins.   Varicose veins are swollen and enlarged veins. They occur most often in the legs. Varicose veins can develop when valves in your veins become damaged. This causes problems with blood flow. Over time, too much blood collects in the veins. The veins may bulge, twist, and stand out under your skin. They can also cause symptoms such as aching, cramping, or swelling in your legs.  During EVLT, heat created using a laser is sent into the vein through a thin, flexible tube (catheter). This closes off blood flow in the main problem vein.  Getting ready for your treatment  Follow any instructions from your healthcare provider.  Tell your healthcare provider if you:  · Are pregnant or think you may be pregnant  · Are breastfeeding  · Smoke or use alcohol on a regular basis  · Have other health problems, such as diabetes or kidney problems  Tell your provider about any medicines you are taking. You may need to stop taking all or some of these before the procedure. This includes:  · Medicines that can thin your blood or prevent clotting (anticoagulants)  · All prescription medicines  · Over-the-counter medicines such as aspirin or ibuprofen  · Street drugs  · Herbs, vitamins, and other supplements  Follow any directions youre given for  not eating or drinking before the treatment.  The day of your treatment    The treatment takes 45 to 60 minutes. The entire treatment (including time to prepare and recover) takes about 1 to 3 hours. You can go home the same day. For the treatment:  · Youll lie down on a hospital bed.  · An imaging method, such as ultrasound, is used to guide the procedure.  · The leg to be treated is injected with numbing medicine.  · Once your leg is numb, a needle makes a small hole (puncture) in the vein to be treated.  · The catheter containing the laser heat source is inserted into your vein.  · More numbing medicine may be injected around the vein.  · Once the catheter is in the right position, it is then slowly drawn backward. As the catheter sends out heat, the vein is closed off.  · In some cases, other side branch varicose veins may be removed or tied off through several small cuts (incisions).  · When the treatment is done, the catheter is removed. Pressure is applied to the insertion site to stop any bleeding. An elastic compression stocking or a bandage may then be put on your leg.  Recovering at home  Once at home, follow all the instructions youve been given. Be sure to:  · Take all medicines as directed  · Care for the catheter insertion site as directed  · Check for signs of infection at the catheter insertion site (see below)  · Wear elastic stockings or bandages as directed  · Keep your legs raised (elevated) as directed  · Walk a few times a day  · Avoid heavy exercise, lifting, and standing for long periods as advised  · Avoid air travel, hot baths, saunas, or whirlpools as advised  Call your healthcare provider  Call your healthcare provider if you have any of the following:  · Fever of 100.4°F (38°C) or higher, or as directed by your provider  · Chest pain or trouble breathing  · Signs of infection at the catheter insertion site. These include greater redness or swelling (inflammation), warmth, increasing  pain, bleeding, or bad-smelling discharge.  · Severe numbness or tingling in the treated leg  · Severe pain or swelling in the treated leg   Follow-up  Youll have a follow-up visit with your healthcare provider within a week. An ultrasound will likely be done to check for problems, such as blood clots. Your provider will discuss more treatments with you, if needed.   Risk and possible complications  These include:  · Bleeding  · Infection  · Blood clots  · Damage to the nerves in the treated area  · Irritation or burning of the skin over the treated vein  · Treatment doesn't improve the look or the symptoms of the problem veins  · Risks of any medicines used during the treatment   Date Last Reviewed: 5/1/2016  © 7949-1825 [employer]. [address] [place] [address], [place] [address]. All rights reserved. This information is not intended as a substitute for professional medical care. Always follow your healthcare professional's instructions.
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
## 2019-10-22 ENCOUNTER — TELEPHONE (OUTPATIENT)
Dept: VASCULAR SURGERY | Facility: CLINIC | Age: 49
End: 2019-10-22

## 2019-10-22 DIAGNOSIS — I87.2 VENOUS INSUFFICIENCY: Primary | ICD-10-CM

## 2019-10-22 NOTE — TELEPHONE ENCOUNTER
Per Dr Orellana called Dr Nelson to inform her of procedure on 10/23/2019 need to be rescheduled, no answer left a message with a callback number [phone].

## 2019-10-29 ENCOUNTER — TELEPHONE (OUTPATIENT)
Dept: VASCULAR SURGERY | Facility: CLINIC | Age: 49
End: 2019-10-29

## 2019-10-29 NOTE — TELEPHONE ENCOUNTER
Per Dr Orellana, called Dr Nelson to schedule venous ultrasound appointment at the [facility], no answer left a message with a call back number [phone].

## 2019-11-04 ENCOUNTER — PATIENT MESSAGE (OUTPATIENT)
Dept: INTERNAL MEDICINE | Facility: CLINIC | Age: 49
End: 2019-11-04

## 2019-11-04 ENCOUNTER — HOSPITAL ENCOUNTER (OUTPATIENT)
Dept: RADIOLOGY | Facility: OTHER | Age: 49
Discharge: HOME | End: 2019-11-04
Attending: PHYSICIAN ASSISTANT
Payer: COMMERCIAL

## 2019-11-04 DIAGNOSIS — N92.6 ABNORMAL MENSTRUAL CYCLE: ICD-10-CM

## 2019-11-04 DIAGNOSIS — N92.6 ABNORMAL MENSTRUAL CYCLE: Primary | ICD-10-CM

## 2019-11-04 PROCEDURE — 76830 TRANSVAGINAL US NON-OB: CPT | Mod: TC

## 2019-11-04 PROCEDURE — 76830 TRANSVAGINAL US NON-OB: CPT | Mod: 26,,, | Performed by: RADIOLOGY

## 2019-11-04 PROCEDURE — 76856 US PELVIS COMP WITH TRANSVAG NON-OB (XPD): ICD-10-PCS | Mod: 26,,, | Performed by: RADIOLOGY

## 2019-11-04 PROCEDURE — 76856 US EXAM PELVIC COMPLETE: CPT | Mod: 26,,, | Performed by: RADIOLOGY

## 2019-11-04 PROCEDURE — 76830 US PELVIS COMP WITH TRANSVAG NON-OB (XPD): ICD-10-PCS | Mod: 26,,, | Performed by: RADIOLOGY

## 2019-11-07 ENCOUNTER — LAB VISIT (OUTPATIENT)
Dept: LAB | Facility: HOSPITAL | Age: 49
End: 2019-11-07
Attending: PHYSICIAN ASSISTANT
Payer: COMMERCIAL

## 2019-11-07 DIAGNOSIS — N92.6 ABNORMAL MENSTRUAL CYCLE: ICD-10-CM

## 2019-11-07 LAB
ALBUMIN SERPL BCP-MCNC: 4.3 G/DL (ref 3.5–5.2)
ALP SERPL-CCNC: 39 U/L (ref 55–135)
ALT SERPL W/O P-5'-P-CCNC: 20 U/L (ref 10–44)
ANION GAP SERPL CALC-SCNC: 6 MMOL/L (ref 8–16)
AST SERPL-CCNC: 24 U/L (ref 10–40)
BASOPHILS # BLD AUTO: 0.02 K/UL (ref 0–0.2)
BASOPHILS NFR BLD: 0.7 % (ref 0–1.9)
BILIRUB SERPL-MCNC: 0.6 MG/DL (ref 0.1–1)
BUN SERPL-MCNC: 20 MG/DL (ref 6–20)
CALCIUM SERPL-MCNC: 9.3 MG/DL (ref 8.7–10.5)
CHLORIDE SERPL-SCNC: 104 MMOL/L (ref 95–110)
CO2 SERPL-SCNC: 29 MMOL/L (ref 23–29)
CREAT SERPL-MCNC: 0.9 MG/DL (ref 0.5–1.4)
DIFFERENTIAL METHOD: ABNORMAL
EOSINOPHIL # BLD AUTO: 0.1 K/UL (ref 0–0.5)
EOSINOPHIL NFR BLD: 2.3 % (ref 0–8)
ERYTHROCYTE [DISTWIDTH] IN BLOOD BY AUTOMATED COUNT: 12.7 % (ref 11.5–14.5)
EST. GFR  (AFRICAN AMERICAN): >60 ML/MIN/1.73 M^2
EST. GFR  (NON AFRICAN AMERICAN): >60 ML/MIN/1.73 M^2
FERRITIN SERPL-MCNC: 78 NG/ML (ref 20–300)
GLUCOSE SERPL-MCNC: 85 MG/DL (ref 70–110)
HCT VFR BLD AUTO: 36.9 % (ref 37–48.5)
HGB BLD-MCNC: 12.4 G/DL (ref 12–16)
IRON SERPL-MCNC: 118 UG/DL (ref 30–160)
LYMPHOCYTES # BLD AUTO: 1.2 K/UL (ref 1–4.8)
LYMPHOCYTES NFR BLD: 40.1 % (ref 18–48)
MCH RBC QN AUTO: 30.1 PG (ref 27–31)
MCHC RBC AUTO-ENTMCNC: 33.6 G/DL (ref 32–36)
MCV RBC AUTO: 90 FL (ref 82–98)
MONOCYTES # BLD AUTO: 0.3 K/UL (ref 0.3–1)
MONOCYTES NFR BLD: 8.7 % (ref 4–15)
NEUTROPHILS # BLD AUTO: 1.4 K/UL (ref 1.8–7.7)
NEUTROPHILS NFR BLD: 48.2 % (ref 38–73)
PLATELET # BLD AUTO: 202 K/UL (ref 150–350)
PMV BLD AUTO: 10.5 FL (ref 9.2–12.9)
POTASSIUM SERPL-SCNC: 4.4 MMOL/L (ref 3.5–5.1)
PROT SERPL-MCNC: 7.3 G/DL (ref 6–8.4)
RBC # BLD AUTO: 4.12 M/UL (ref 4–5.4)
SATURATED IRON: 34 % (ref 20–50)
SODIUM SERPL-SCNC: 139 MMOL/L (ref 136–145)
T4 FREE SERPL-MCNC: 0.86 NG/DL (ref 0.71–1.51)
TOTAL IRON BINDING CAPACITY: 351 UG/DL (ref 250–450)
TRANSFERRIN SERPL-MCNC: 237 MG/DL (ref 200–375)
TSH SERPL DL<=0.005 MIU/L-ACNC: 4.03 UIU/ML (ref 0.4–4)
WBC # BLD AUTO: 2.99 K/UL (ref 3.9–12.7)

## 2019-11-07 PROCEDURE — 84439 ASSAY OF FREE THYROXINE: CPT

## 2019-11-07 PROCEDURE — 82728 ASSAY OF FERRITIN: CPT

## 2019-11-07 PROCEDURE — 84443 ASSAY THYROID STIM HORMONE: CPT

## 2019-11-07 PROCEDURE — 80053 COMPREHEN METABOLIC PANEL: CPT

## 2019-11-07 PROCEDURE — 36415 COLL VENOUS BLD VENIPUNCTURE: CPT

## 2019-11-07 PROCEDURE — 85025 COMPLETE CBC W/AUTO DIFF WBC: CPT

## 2019-11-07 PROCEDURE — 83540 ASSAY OF IRON: CPT

## 2019-11-13 ENCOUNTER — CLINICAL SUPPORT (OUTPATIENT)
Dept: CARDIOLOGY | Facility: CLINIC | Age: 49
End: 2019-11-13
Attending: SURGERY
Payer: COMMERCIAL

## 2019-11-13 DIAGNOSIS — I87.2 VENOUS INSUFFICIENCY: ICD-10-CM

## 2019-11-13 LAB
LEFT GIAC DIA: 0.22 MM
LEFT GREAT SAPHENOUS DISTAL THIGH DIA: 0.44 CM
LEFT GREAT SAPHENOUS DISTAL THIGH REFLUX: 5259 MS
LEFT GREAT SAPHENOUS JUNCTION DIA: 0.98 CM
LEFT GREAT SAPHENOUS KNEE DIA: 0.28 CM
LEFT GREAT SAPHENOUS MIDDLE THIGH DIA: 0.38 CM
LEFT GREAT SAPHENOUS PROXIMAL CALF DIA: 0.22 CM
RIGHT GIAC DIA: 0.19 MM
RIGHT GREAT SAPHENOUS DISTAL THIGH DIA: 0.36 CM
RIGHT GREAT SAPHENOUS JUNCTION DIA: 0.46 CM
RIGHT GREAT SAPHENOUS KNEE DIA: 0.25 CM
RIGHT GREAT SAPHENOUS KNEE REFLUX: 2083 MS
RIGHT GREAT SAPHENOUS MIDDLE THIGH DIA: 0.41 CM
RIGHT GREAT SAPHENOUS PROXIMAL CALF DIA: 0.25 CM

## 2019-11-13 PROCEDURE — 93970 EXTREMITY STUDY: CPT | Mod: S$GLB,,, | Performed by: INTERNAL MEDICINE

## 2019-11-13 PROCEDURE — 93970 CV US LOWER VENOUS INSUFFICIENCY BILATERAL (CUPID ONLY): ICD-10-PCS | Mod: S$GLB,,, | Performed by: INTERNAL MEDICINE

## 2019-11-18 ENCOUNTER — PATIENT MESSAGE (OUTPATIENT)
Dept: VASCULAR SURGERY | Facility: CLINIC | Age: 49
End: 2019-11-18

## 2020-01-12 DIAGNOSIS — F32.89 OTHER DEPRESSION: ICD-10-CM

## 2020-01-13 RX ORDER — ESCITALOPRAM OXALATE 10 MG/1
TABLET ORAL
Qty: 90 TABLET | Refills: 4 | Status: SHIPPED | OUTPATIENT
Start: 2020-01-13 | End: 2020-11-12 | Stop reason: SDUPTHER

## 2020-02-12 ENCOUNTER — HOSPITAL ENCOUNTER (OUTPATIENT)
Dept: VASCULAR SURGERY | Facility: CLINIC | Age: 50
Discharge: HOME | End: 2020-02-12
Attending: SURGERY
Payer: COMMERCIAL

## 2020-02-12 DIAGNOSIS — I87.2 VENOUS INSUFFICIENCY: ICD-10-CM

## 2020-02-12 PROCEDURE — 93971 EXTREMITY STUDY: CPT | Mod: S$GLB,,, | Performed by: SURGERY

## 2020-02-12 PROCEDURE — 93971 PR US DUPLEX, UPPER OR LOWER EXT VENOUS,UNILAT OR LTD: ICD-10-PCS | Mod: S$GLB,,, | Performed by: SURGERY

## 2020-03-28 ENCOUNTER — TELEPHONE (OUTPATIENT)
Dept: FAMILY MEDICINE | Facility: CLINIC | Age: 50
End: 2020-03-28

## 2020-03-28 ENCOUNTER — CLINICAL SUPPORT (OUTPATIENT)
Dept: INTERNAL MEDICINE | Facility: CLINIC | Age: 50
End: 2020-03-28
Payer: COMMERCIAL

## 2020-03-28 DIAGNOSIS — R50.9 FEVER, UNSPECIFIED FEVER CAUSE: Primary | ICD-10-CM

## 2020-03-28 DIAGNOSIS — Z20.822 CLOSE EXPOSURE TO COVID-19 VIRUS: ICD-10-CM

## 2020-03-28 DIAGNOSIS — Z20.9 EXPOSURE TO COMMUNICABLE DISEASE: ICD-10-CM

## 2020-03-28 DIAGNOSIS — Z20.9 EXPOSURE TO COMMUNICABLE DISEASE: Primary | ICD-10-CM

## 2020-03-28 PROCEDURE — U0002 COVID-19 LAB TEST NON-CDC: HCPCS

## 2020-03-29 LAB — SARS-COV-2 RNA RESP QL NAA+PROBE: NOT DETECTED

## 2020-03-30 ENCOUNTER — TELEPHONE (OUTPATIENT)
Dept: INFECTIOUS DISEASES | Facility: CLINIC | Age: 50
End: 2020-03-30

## 2020-03-30 NOTE — TELEPHONE ENCOUNTER
Called pt on behalf of [facility] to discuss COVID-19 result. LVM saying [facility] will attempt to contact in the next 24 hours.

## 2020-03-30 NOTE — PROGRESS NOTES
Your test was NEGATIVE for COVID-19 (coronavirus).  If you still have symptoms, treat with rest, fluids, and over-the-counter medications.  Continue to stay home, avoid large crowds, and practice proper handwashing.     If your symptoms worsen or if you have any other concerns, please contact [facility] at [phone].     Sincerely,    Ilene Lopez NP

## 2020-03-31 ENCOUNTER — TELEPHONE (OUTPATIENT)
Dept: INFECTIOUS DISEASES | Facility: CLINIC | Age: 50
End: 2020-03-31

## 2020-04-01 ENCOUNTER — TELEPHONE (OUTPATIENT)
Dept: INFECTIOUS DISEASES | Facility: CLINIC | Age: 50
End: 2020-04-01

## 2020-04-01 NOTE — TELEPHONE ENCOUNTER
Called pt on behalf of [facility] to discuss COVID-19 result. LVM stating [facility] will attempt to call back in 24 hours and gave [facility] number.

## 2020-04-21 DIAGNOSIS — Z01.84 ANTIBODY RESPONSE EXAMINATION: ICD-10-CM

## 2020-04-22 ENCOUNTER — LAB VISIT (OUTPATIENT)
Dept: LAB | Facility: HOSPITAL | Age: 50
End: 2020-04-22
Attending: INTERNAL MEDICINE
Payer: COMMERCIAL

## 2020-04-22 DIAGNOSIS — Z01.84 ANTIBODY RESPONSE EXAMINATION: ICD-10-CM

## 2020-04-22 LAB — SARS-COV-2 IGG SERPL QL IA: NEGATIVE

## 2020-04-22 PROCEDURE — 36415 COLL VENOUS BLD VENIPUNCTURE: CPT

## 2020-04-22 PROCEDURE — 86769 SARS-COV-2 COVID-19 ANTIBODY: CPT

## 2020-06-17 ENCOUNTER — PATIENT MESSAGE (OUTPATIENT)
Dept: INTERNAL MEDICINE | Facility: CLINIC | Age: 50
End: 2020-06-17

## 2020-06-17 DIAGNOSIS — Z12.31 VISIT FOR SCREENING MAMMOGRAM: Primary | ICD-10-CM

## 2020-07-21 DIAGNOSIS — I87.2 VENOUS INSUFFICIENCY OF BOTH LOWER EXTREMITIES: Primary | ICD-10-CM

## 2020-08-05 ENCOUNTER — TELEPHONE (OUTPATIENT)
Dept: INTERNAL MEDICINE | Facility: CLINIC | Age: 50
End: 2020-08-05

## 2020-08-05 DIAGNOSIS — B02.9 HERPES ZOSTER WITHOUT COMPLICATION: Primary | ICD-10-CM

## 2020-08-05 RX ORDER — VALACYCLOVIR HYDROCHLORIDE 1 G/1
1000 TABLET, FILM COATED ORAL 3 TIMES DAILY
Qty: 21 TABLET | Refills: 0 | Status: SHIPPED | OUTPATIENT
Start: 2020-08-05 | End: 2020-11-12

## 2020-08-27 ENCOUNTER — HOSPITAL ENCOUNTER (OUTPATIENT)
Dept: RADIOLOGY | Facility: HOSPITAL | Age: 50
Discharge: HOME | End: 2020-08-27
Attending: PHYSICIAN ASSISTANT
Payer: COMMERCIAL

## 2020-08-27 DIAGNOSIS — Z12.31 VISIT FOR SCREENING MAMMOGRAM: ICD-10-CM

## 2020-08-27 PROCEDURE — 77063 MAMMO DIGITAL SCREENING BILAT WITH TOMOSYNTHESIS_CAD: ICD-10-PCS | Mod: 26,,, | Performed by: RADIOLOGY

## 2020-08-27 PROCEDURE — 77067 MAMMO DIGITAL SCREENING BILAT WITH TOMOSYNTHESIS_CAD: ICD-10-PCS | Mod: 26,,, | Performed by: RADIOLOGY

## 2020-08-27 PROCEDURE — 77067 SCR MAMMO BI INCL CAD: CPT | Mod: TC

## 2020-08-27 PROCEDURE — 77067 SCR MAMMO BI INCL CAD: CPT | Mod: 26,,, | Performed by: RADIOLOGY

## 2020-08-27 PROCEDURE — 77063 BREAST TOMOSYNTHESIS BI: CPT | Mod: 26,,, | Performed by: RADIOLOGY

## 2020-09-01 DIAGNOSIS — I87.2 VENOUS INSUFFICIENCY: Primary | ICD-10-CM

## 2020-09-23 ENCOUNTER — OFFICE VISIT (OUTPATIENT)
Dept: VASCULAR SURGERY | Facility: CLINIC | Age: 50
End: 2020-09-23
Payer: COMMERCIAL

## 2020-09-23 VITALS
HEART RATE: 55 BPM | SYSTOLIC BLOOD PRESSURE: 129 MMHG | WEIGHT: 140.63 LBS | BODY MASS INDEX: 20.13 KG/M2 | HEIGHT: 70 IN | DIASTOLIC BLOOD PRESSURE: 89 MMHG | RESPIRATION RATE: 20 BRPM | TEMPERATURE: 98 F

## 2020-09-23 DIAGNOSIS — I87.2 VENOUS INSUFFICIENCY: Primary | ICD-10-CM

## 2020-09-23 PROCEDURE — 3008F BODY MASS INDEX DOCD: CPT | Mod: CPTII,S$GLB,, | Performed by: SURGERY

## 2020-09-23 PROCEDURE — 3079F PR MOST RECENT DIASTOLIC BLOOD PRESSURE 80-89 MM HG: ICD-10-PCS | Mod: CPTII,S$GLB,, | Performed by: SURGERY

## 2020-09-23 PROCEDURE — 3074F SYST BP LT 130 MM HG: CPT | Mod: CPTII,S$GLB,, | Performed by: SURGERY

## 2020-09-23 PROCEDURE — 99214 PR OFFICE/OUTPT VISIT, EST, LEVL IV, 30-39 MIN: ICD-10-PCS | Mod: S$GLB,,, | Performed by: SURGERY

## 2020-09-23 PROCEDURE — 99999 PR PBB SHADOW E&M-EST. PATIENT-LVL IV: ICD-10-PCS | Mod: PBBFAC,,, | Performed by: SURGERY

## 2020-09-23 PROCEDURE — 3079F DIAST BP 80-89 MM HG: CPT | Mod: CPTII,S$GLB,, | Performed by: SURGERY

## 2020-09-23 PROCEDURE — 99214 OFFICE O/P EST MOD 30 MIN: CPT | Mod: S$GLB,,, | Performed by: SURGERY

## 2020-09-23 PROCEDURE — 99999 PR PBB SHADOW E&M-EST. PATIENT-LVL IV: CPT | Mod: PBBFAC,,, | Performed by: SURGERY

## 2020-09-23 PROCEDURE — 3074F PR MOST RECENT SYSTOLIC BLOOD PRESSURE < 130 MM HG: ICD-10-PCS | Mod: CPTII,S$GLB,, | Performed by: SURGERY

## 2020-09-23 PROCEDURE — 3008F PR BODY MASS INDEX (BMI) DOCUMENTED: ICD-10-PCS | Mod: CPTII,S$GLB,, | Performed by: SURGERY

## 2020-09-23 NOTE — PATIENT INSTRUCTIONS
Endovenous Laser Treatment (EVLT) for Varicose Veins  Endovenous laser treatment (EVLT) is a procedure that uses laser heat to treat varicose veins.   Varicose veins are swollen and enlarged veins. They occur most often in the legs. Varicose veins can develop when valves in your veins become damaged. This causes problems with blood flow. Over time, too much blood collects in the veins. The veins may bulge, twist, and stand out under your skin. They can also cause symptoms such as aching, cramping, or swelling in your legs.  During EVLT, heat created using a laser is sent into the vein through a thin, flexible tube (catheter). This closes off blood flow in the main problem vein.  Getting ready for your treatment  Follow any instructions from your healthcare provider.  Tell your healthcare provider if you:  · Are pregnant or think you may be pregnant  · Are breastfeeding  · Smoke or use alcohol on a regular basis  · Have other health problems, such as diabetes or kidney problems  Tell your provider about any medicines you are taking. You may need to stop taking all or some of these before the procedure. This includes:  · Medicines that can thin your blood or prevent clotting (anticoagulants)  · All prescription medicines  · Over-the-counter medicines such as aspirin or ibuprofen  · Street drugs  · Herbs, vitamins, and other supplements  Follow any directions youre given for not eating or drinking before the treatment.  The day of your treatment    The treatment takes 45 to 60 minutes. The entire treatment (including time to prepare and recover) takes about 1 to 3 hours. You can go home the same day. For the treatment:  · Youll lie down on a hospital bed.  · An imaging method, such as ultrasound, is used to guide the procedure.  · The leg to be treated is injected with numbing medicine.  · Once your leg is numb, a needle makes a small hole (puncture) in the vein to be treated.  · The catheter containing the laser  heat source is inserted into your vein.  · More numbing medicine may be injected around the vein.  · Once the catheter is in the right position, it is then slowly drawn backward. As the catheter sends out heat, the vein is closed off.  · In some cases, other side branch varicose veins may be removed or tied off through several small cuts (incisions).  · When the treatment is done, the catheter is removed. Pressure is applied to the insertion site to stop any bleeding. An elastic compression stocking or a bandage may then be put on your leg.  Recovering at home  Once at home, follow all the instructions youve been given. Be sure to:  · Take all medicines as directed  · Care for the catheter insertion site as directed  · Check for signs of infection at the catheter insertion site (see below)  · Wear elastic stockings or bandages as directed  · Keep your legs raised (elevated) as directed  · Walk a few times a day  · Avoid heavy exercise, lifting, and standing for long periods as advised  · Avoid air travel, hot baths, saunas, or whirlpools as advised  Call your healthcare provider  Call your healthcare provider if you have any of the following:  · Fever of 100.4°F (38°C) or higher, or as directed by your provider  · Chest pain or trouble breathing  · Signs of infection at the catheter insertion site. These include greater redness or swelling (inflammation), warmth, increasing pain, bleeding, or bad-smelling discharge.  · Severe numbness or tingling in the treated leg  · Severe pain or swelling in the treated leg   Follow-up  Youll have a follow-up visit with your healthcare provider within a week. An ultrasound will likely be done to check for problems, such as blood clots. Your provider will discuss more treatments with you, if needed.   Risk and possible complications  These include:  · Bleeding  · Infection  · Blood clots  · Damage to the nerves in the treated area  · Irritation or burning of the skin over the  treated vein  · Treatment doesn't improve the look or the symptoms of the problem veins  · Risks of any medicines used during the treatment   Date Last Reviewed: 5/1/2016 © 2000-2017 [employer]. [address] [place] [address], [place] [address]. All rights reserved. This information is not intended as a substitute for professional medical care. Always follow your healthcare professional's instructions.          Understanding Chronic Venous Insufficiency  Problems with the veins in the legs may lead to chronic venous insufficiency (CVI). CVI means that there is a long-term problem with the veins not being able to pump blood back to your heart. When this happens, blood stays in the legs and causes swelling and aching.   Two problems that may lead to chronic venous insufficiency are:  · Damaged valves. Valves keep blood flowing from the legs through the blood vessels and back to the heart. When the valves are damaged, blood does not flow as well.   · Deep vein thrombosis (DVT). Blood clots may form in the deep veins of the legs. This may cause pain, redness, and swelling in the legs. It may also block the flow of blood back to the heart. Seek immediate medical care if you have these symptoms.  · A blood clot in the leg can also break off and travel to the lungs. This is called pulmonary embolism (PE). In the lungs, the clot can cut off the flow of blood. This may cause chest pain, trouble breathing, sweating, a fast heartbeat, coughing (may cough up blood), and fainting. It is a medical emergency and may cause death. Call 911 if you have these symptoms.  · Healthcare providers call the two conditions, DVT and PE, venous thromboembolism (VTE).  CVI cant be cured, but you can control leg swelling to reduce the likelihood of ulcers (sores).  Recognizing the symptoms  Be aware of the following:  · If you stand or sit with your feet down for long periods, your legs may ache or feel heavy.  · Swollen ankles are  possibly the most common symptom of CVI.  · As swelling increases, the skin over your ankles may show red spots or a brownish tinge. The skin may feel leathery or scaly, and may start to itch.  · If swelling is not controlled, an ulcer (open wound) may form.  What you can do  Reduce your risk of developing ulcers by doing the following:  · Increase blood flow back to your heart by elevating your legs, exercising daily, and wearing elastic stockings.  · Boost blood flow in your legs by losing excess weight.  · If you must stand or sit in one place for a period of time, keep your blood moving by wiggling your toes, shifting your body position, and rising up on the balls of your feet.    Date Last Reviewed: 5/1/2016  © 3788-2044 [employer]. [address] [place] [address], [place] [address]. All rights reserved. This information is not intended as a substitute for professional medical care. Always follow your healthcare professional's instructions.
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
## 2020-10-01 DIAGNOSIS — I87.2 VENOUS INSUFFICIENCY: Primary | ICD-10-CM

## 2020-10-02 ENCOUNTER — PROCEDURE VISIT (OUTPATIENT)
Dept: VASCULAR SURGERY | Facility: CLINIC | Age: 50
End: 2020-10-02
Payer: COMMERCIAL

## 2020-10-02 VITALS
WEIGHT: 141.56 LBS | DIASTOLIC BLOOD PRESSURE: 84 MMHG | SYSTOLIC BLOOD PRESSURE: 135 MMHG | HEIGHT: 70 IN | HEART RATE: 55 BPM | BODY MASS INDEX: 20.27 KG/M2 | TEMPERATURE: 98 F

## 2020-10-02 DIAGNOSIS — I87.2 VENOUS INSUFFICIENCY OF BOTH LOWER EXTREMITIES: ICD-10-CM

## 2020-10-02 DIAGNOSIS — I87.2 VENOUS INSUFFICIENCY (CHRONIC) (PERIPHERAL): Primary | ICD-10-CM

## 2020-10-02 PROCEDURE — 37799 UNLISTED PX VASCULAR SURGERY: CPT | Mod: RT,S$GLB,, | Performed by: SURGERY

## 2020-10-02 PROCEDURE — 36478 ENDOVENOUS LASER 1ST VEIN: CPT | Mod: RT,S$GLB,, | Performed by: SURGERY

## 2020-10-02 PROCEDURE — 36478 PR ENDOVENOUS LASER, 1ST VEIN: ICD-10-PCS | Mod: RT,S$GLB,, | Performed by: SURGERY

## 2020-10-02 PROCEDURE — 37799 PR STAB PHLEBECTOMY 1-4: ICD-10-PCS | Mod: RT,S$GLB,, | Performed by: SURGERY

## 2020-10-02 NOTE — PROCEDURES
Jessica Nelson MD; 10/02/2020    Pre-Procedure Diagnosis: Right greater saphenous vein reflux; significant superficial venous insufficiency    Post-Procedure Diagnosis: Same    Procedure: Laser endovenous ablation of the right greater saphenousvein; excision of branch varicosities x 4    Surgeon: Félix Orellana MD FACS     Anesthesia: Local    The skin of the leg was prepped and draped in sterile fashion.  Ultrasound-guidance was used throughout the procedure with a portable duplex ultrasound machine.  The right GSV was cannulated below the knee using a micro-puncture system to cannulate an area of the right GSV.  An 0.035 wire J-tip followed by a 4-Fr Angiodynamics sheath was placed.  The laser fiber was passed through the sheath and the sheath was then pin-pulled back.  Using tumescent anesthesia, the entire sheathed portion of the GSV was anesthesized keeping the vein at least one cm from the skin; Klein pump was used.  The 1470 nm laser was then activated and the entire length of the vein was treated at ~ 49 J/cm.  The fiber and sheath were then removed intact.  Duplex confirmed occlusion of the GSV with continued patency of the common femoral vein.  After administration of additional tumescent anesthesia, branch varicose vein throughout the leg were removed through vertical and transverse incisions with Oesch hooks; stab phlebectomies in the distal medial thigh.  The incision was closed with Steri-strips.  Sterile compression dressings and a compression stocking were applied and the patient was discharged to home in a satisfactory condition.    Cannulation site: Below-the-knee    Sheath length: 43 cm    [place] of power: 7 W    Laser time: 395.4 sec    Joules: 2767.8 J         Félix Orellana MD FACS   Vascular & Endovascular Surgery

## 2020-10-07 ENCOUNTER — OFFICE VISIT (OUTPATIENT)
Dept: VASCULAR SURGERY | Facility: CLINIC | Age: 50
End: 2020-10-07
Payer: COMMERCIAL

## 2020-10-07 ENCOUNTER — HOSPITAL ENCOUNTER (OUTPATIENT)
Dept: VASCULAR SURGERY | Facility: CLINIC | Age: 50
Discharge: HOME | End: 2020-10-07
Attending: SURGERY
Payer: COMMERCIAL

## 2020-10-07 VITALS
WEIGHT: 140.63 LBS | BODY MASS INDEX: 20.13 KG/M2 | RESPIRATION RATE: 20 BRPM | DIASTOLIC BLOOD PRESSURE: 89 MMHG | HEART RATE: 65 BPM | TEMPERATURE: 98 F | HEIGHT: 70 IN | SYSTOLIC BLOOD PRESSURE: 125 MMHG

## 2020-10-07 DIAGNOSIS — I87.2 VENOUS INSUFFICIENCY: ICD-10-CM

## 2020-10-07 DIAGNOSIS — I87.2 VENOUS INSUFFICIENCY: Primary | ICD-10-CM

## 2020-10-07 PROCEDURE — 3079F DIAST BP 80-89 MM HG: CPT | Mod: CPTII,S$GLB,, | Performed by: SURGERY

## 2020-10-07 PROCEDURE — 99999 PR PBB SHADOW E&M-EST. PATIENT-LVL III: ICD-10-PCS | Mod: PBBFAC,,, | Performed by: SURGERY

## 2020-10-07 PROCEDURE — 99999 PR PBB SHADOW E&M-EST. PATIENT-LVL III: CPT | Mod: PBBFAC,,, | Performed by: SURGERY

## 2020-10-07 PROCEDURE — 3074F PR MOST RECENT SYSTOLIC BLOOD PRESSURE < 130 MM HG: ICD-10-PCS | Mod: CPTII,S$GLB,, | Performed by: SURGERY

## 2020-10-07 PROCEDURE — 99213 OFFICE O/P EST LOW 20 MIN: CPT | Mod: S$GLB,,, | Performed by: SURGERY

## 2020-10-07 PROCEDURE — 3079F PR MOST RECENT DIASTOLIC BLOOD PRESSURE 80-89 MM HG: ICD-10-PCS | Mod: CPTII,S$GLB,, | Performed by: SURGERY

## 2020-10-07 PROCEDURE — 3074F SYST BP LT 130 MM HG: CPT | Mod: CPTII,S$GLB,, | Performed by: SURGERY

## 2020-10-07 PROCEDURE — 3008F PR BODY MASS INDEX (BMI) DOCUMENTED: ICD-10-PCS | Mod: CPTII,S$GLB,, | Performed by: SURGERY

## 2020-10-07 PROCEDURE — 99213 PR OFFICE/OUTPT VISIT, EST, LEVL III, 20-29 MIN: ICD-10-PCS | Mod: S$GLB,,, | Performed by: SURGERY

## 2020-10-07 PROCEDURE — 3008F BODY MASS INDEX DOCD: CPT | Mod: CPTII,S$GLB,, | Performed by: SURGERY
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
## 2020-10-09 DIAGNOSIS — I87.2 VENOUS INSUFFICIENCY: Primary | ICD-10-CM

## 2020-10-13 ENCOUNTER — OFFICE VISIT (OUTPATIENT)
Dept: URGENT CARE | Facility: CLINIC | Age: 50
End: 2020-10-13
Payer: COMMERCIAL

## 2020-10-13 VITALS
HEIGHT: 70 IN | WEIGHT: 138 LBS | BODY MASS INDEX: 19.76 KG/M2 | HEART RATE: 70 BPM | OXYGEN SATURATION: 97 % | TEMPERATURE: 98 F | DIASTOLIC BLOOD PRESSURE: 84 MMHG | SYSTOLIC BLOOD PRESSURE: 126 MMHG

## 2020-10-13 DIAGNOSIS — J01.00 ACUTE NON-RECURRENT MAXILLARY SINUSITIS: Primary | ICD-10-CM

## 2020-10-13 DIAGNOSIS — R09.81 SINUS CONGESTION: ICD-10-CM

## 2020-10-13 DIAGNOSIS — J02.9 PHARYNGITIS, UNSPECIFIED ETIOLOGY: ICD-10-CM

## 2020-10-13 LAB
CTP QC/QA: YES
SARS-COV-2 RDRP RESP QL NAA+PROBE: NEGATIVE

## 2020-10-13 PROCEDURE — U0002 COVID-19 LAB TEST NON-CDC: HCPCS | Mod: QW,S$GLB,, | Performed by: PHYSICIAN ASSISTANT

## 2020-10-13 PROCEDURE — 99214 PR OFFICE/OUTPT VISIT, EST, LEVL IV, 30-39 MIN: ICD-10-PCS | Mod: S$GLB,,, | Performed by: PHYSICIAN ASSISTANT

## 2020-10-13 PROCEDURE — U0002: ICD-10-PCS | Mod: QW,S$GLB,, | Performed by: PHYSICIAN ASSISTANT

## 2020-10-13 PROCEDURE — 99214 OFFICE O/P EST MOD 30 MIN: CPT | Mod: S$GLB,,, | Performed by: PHYSICIAN ASSISTANT

## 2020-10-13 NOTE — PROGRESS NOTES
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    Vitals:  height is 5' 10" (1.778 m) and weight is 62.6 kg (138 lb). Her temperature is 97.6 °F (36.4 °C). Her blood pressure is 126/84 and her pulse is 70. Her oxygen saturation is 97%.     Chief Complaint: Sinus Problem    Patient presents with c.o pnd, sinus congestion, and sore throat. Onset of sxs last night. No cough, sob, fever. Denies loss of smell or taste. No gi complaints. No known exp to covid.     Sinus Problem  This is a new problem. The current episode started yesterday. The problem is unchanged. There has been no fever. Associated symptoms include congestion and a sore throat. Pertinent negatives include no chills, coughing, diaphoresis, ear pain, shortness of breath or sinus pressure. Past treatments include nothing.       Constitution: Positive for fatigue. Negative for chills, sweating and fever.   HENT: Positive for congestion, postnasal drip, sore throat and voice change. Negative for ear pain, sinus pain and sinus pressure.    Neck: Negative for painful lymph nodes.   Eyes: Negative for eye redness.   Respiratory: Negative for chest tightness, cough, sputum production, bloody sputum, COPD, shortness of breath, stridor, wheezing and asthma.    Gastrointestinal: Negative for nausea and vomiting.   Musculoskeletal: Negative for muscle ache.   Skin: Negative for rash.   Allergic/Immunologic: Negative for seasonal allergies and asthma.   Hematologic/Lymphatic: Negative for swollen lymph nodes.       Objective:      Physical Exam   Constitutional: She is oriented to person, place, and time. She appears well-developed. She is cooperative.  Non-toxic appearance. She does not appear ill. No distress.   HENT:   Head: Normocephalic and atraumatic.   Ears:   Right Ear: Hearing, tympanic membrane, external ear and ear canal normal.   Left Ear: Hearing, tympanic membrane, external ear and ear canal normal.   Nose: Rhinorrhea present. No " mucosal edema or nasal deformity. No epistaxis. Right sinus exhibits maxillary sinus tenderness. Right sinus exhibits no frontal sinus tenderness. Left sinus exhibits maxillary sinus tenderness. Left sinus exhibits no frontal sinus tenderness.   Mouth/Throat: Uvula is midline, oropharynx is clear and moist and mucous membranes are normal. No trismus in the jaw. Normal dentition. No uvula swelling. Cobblestoning present. No oropharyngeal exudate, posterior oropharyngeal edema, posterior oropharyngeal erythema or tonsillar abscesses. Tonsils are 0 on the right. Tonsils are 0 on the left. No tonsillar exudate.   Eyes: Conjunctivae and lids are normal. No scleral icterus.   Neck: Trachea normal, full passive range of motion without pain and phonation normal. Neck supple. No neck rigidity. No edema and no erythema present.   Cardiovascular: Normal rate, regular rhythm, normal heart sounds and normal pulses.   Pulmonary/Chest: Effort normal and breath sounds normal. No respiratory distress. She has no decreased breath sounds. She has no rhonchi.   Abdominal: Normal appearance.   Musculoskeletal: Normal range of motion.         General: No deformity.   Lymphadenopathy:        Head (right side): No submental, no submandibular, no tonsillar, no preauricular and no posterior auricular adenopathy present.        Head (left side): No submental, no submandibular, no tonsillar, no preauricular and no posterior auricular adenopathy present.     She has no cervical adenopathy.        Right cervical: No superficial cervical and no deep cervical adenopathy present.       Left cervical: No superficial cervical and no deep cervical adenopathy present.   Neurological: She is alert and oriented to person, place, and time. She exhibits normal muscle tone. Coordination normal.   Skin: Skin is warm, dry, intact, not diaphoretic and not pale. Psychiatric: Her speech is normal and behavior is normal. Judgment and thought content normal.    Nursing note and vitals reviewed.        Assessment:       1. Acute non-recurrent maxillary sinusitis    2. Sinus congestion    3. Pharyngitis, unspecified etiology        Plan:         Acute non-recurrent maxillary sinusitis    Sinus congestion  -     POCT COVID-19 Rapid Screening    Pharyngitis, unspecified etiology    reviewed lab results with patient. Discussed diagnosis with patient as well as treatment and home care. Discussed return to clinic precautions vs ER precautions. All patients questions answered. Patient verbalized understanding. Patient agreed with plan of care.         Sinusitis (No Antibiotics)    The sinuses are air-filled spaces within the bones of the face. They connect to the inside of the nose. Sinusitis is an inflammation of the tissue lining the sinus cavity. Sinus inflammation can occur during a cold. It can also be due to allergies to pollens and other particles in the air. It can cause symptoms such as sinus congestion, headache, sore throat, facial swelling and fullness. It may also cause a low-grade fever. No infection is present, and no antibiotic treatment is needed.  Home care  · Drink plenty of water, hot tea, and other liquids. This may help thin mucus. It also may promote sinus drainage.  · Heat may help soothe painful areas of the face. Use a towel soaked in hot water. Or,  the shower and direct the hot spray onto your face. Using a vaporizer along with a menthol rub at night may also help.   · An expectorant containing guaifenesin may help thin the mucus and promote drainage from the sinuses.  · Over-the-counter decongestants may be used unless a similar medicine was prescribed. Nasal sprays work the fastest. Use one that contains phenylephrine or oxymetazoline. First blow the nose gently. Then use the spray. Do not use these medicines more often than directed on the label or symptoms may get worse. You may also use tablets containing pseudoephedrine. Avoid products  that combine ingredients, because side effects may be increased. Read labels. You can also ask the pharmacist for help. (NOTE: Persons with high blood pressure should not use decongestants. They can raise blood pressure.)  · Over-the-counter antihistamines may help if allergies contributed to your sinusitis.    · Use acetaminophen or ibuprofen to control pain, unless another pain medicine was prescribed. (If you have chronic liver or kidney disease or ever had a stomach ulcer, talk with your doctor before using these medicines. Aspirin should never be used in anyone under 18 years of age who is ill with a fever. It may cause severe liver damage.)  · Use nasal rinses or irrigation as instructed by your health care provider.  · Don't smoke. This can worsen symptoms.  Follow-up care  Follow up with your healthcare provider or our staff if you are not improving within the next week.  When to seek medical advice  Call your healthcare provider if any of these occur:  · Green or yellow discharge from the nose or into the throat  · Facial pain or headache becoming more severe  · Stiff neck  · Unusual drowsiness or confusion  · Swelling of the forehead or eyelids  · Vision problems, including blurred or double vision  · Fever of 100.4ºF (38ºC) or higher, or as directed by your healthcare provider  · Seizure  · Breathing problems  · Symptoms not resolving within 10 days  Date Last Reviewed: 4/13/2015  © 7475-3541 "[employer] [address] [place] [address]. All rights reserved. This information is not intended as a substitute for professional medical care. Always follow your healthcare professional's instructions.      Patient Instructions   -Below are suggestions for symptomatic relief:              -Tylenol every 4 hours OR ibuprofen every 6 hours as needed for pain/fever.              -Salt water gargles to soothe throat pain.              -Chloroseptic spray also helps to numb throat pain.               -Nasal saline spray reduces inflammation and dryness.              -Warm face compresses to help with facial sinus pain/pressure.              -Vicks vapor rub at night.              -Flonase OTC or Nasacort OTC for nasal congestion.              -Simple foods like chicken noodle soup.              -Delsym helps with coughing at night              -Zyrtec/Claritin during the day & Benadryl at night may help with allergies.                If you DO NOT have Hypertension or any history of palpitations, it is ok to take over the counter Sudafed or Mucinex D or Allegra-D or Claritin-D or Zyrtec-D.  If you do take one of the above, it is ok to combine that with plain over the counter Mucinex or Allegra or Claritin or Zyrtec. If, for example, you are taking Zyrtec -D, you can combine that with Mucinex, but not Mucinex-D.  If you are taking Mucinex-D, you can combine that with plain Allegra or Claritin or Zyrtec.   If you DO have Hypertension or palpitations, it is safe to take Coricidin HBP for relief of sinus symptoms.      Please follow up with your Primary care provider within 2-5 days if your signs and symptoms have not resolved or worsen.     If your condition worsens or fails to improve we recommend that you receive another evaluation at the emergency room immediately or contact your primary medical clinic to discuss your concerns.   You must understand that you have received an Urgent Care treatment only and that you may be released before all of your medical problems are known or treated. You, the patient, will arrange for follow up care as instructed.     RED FLAGS/WARNING SYMPTOMS DISCUSSED WITH PATIENT THAT WOULD WARRANT EMERGENT MEDICAL ATTENTION. PATIENT VERBALIZED UNDERSTANDING.

## 2020-10-13 NOTE — PATIENT INSTRUCTIONS
Sinusitis (No Antibiotics)    The sinuses are air-filled spaces within the bones of the face. They connect to the inside of the nose. Sinusitis is an inflammation of the tissue lining the sinus cavity. Sinus inflammation can occur during a cold. It can also be due to allergies to pollens and other particles in the air. It can cause symptoms such as sinus congestion, headache, sore throat, facial swelling and fullness. It may also cause a low-grade fever. No infection is present, and no antibiotic treatment is needed.  Home care  · Drink plenty of water, hot tea, and other liquids. This may help thin mucus. It also may promote sinus drainage.  · Heat may help soothe painful areas of the face. Use a towel soaked in hot water. Or,  the shower and direct the hot spray onto your face. Using a vaporizer along with a menthol rub at night may also help.   · An expectorant containing guaifenesin may help thin the mucus and promote drainage from the sinuses.  · Over-the-counter decongestants may be used unless a similar medicine was prescribed. Nasal sprays work the fastest. Use one that contains phenylephrine or oxymetazoline. First blow the nose gently. Then use the spray. Do not use these medicines more often than directed on the label or symptoms may get worse. You may also use tablets containing pseudoephedrine. Avoid products that combine ingredients, because side effects may be increased. Read labels. You can also ask the pharmacist for help. (NOTE: Persons with high blood pressure should not use decongestants. They can raise blood pressure.)  · Over-the-counter antihistamines may help if allergies contributed to your sinusitis.    · Use acetaminophen or ibuprofen to control pain, unless another pain medicine was prescribed. (If you have chronic liver or kidney disease or ever had a stomach ulcer, talk with your doctor before using these medicines. Aspirin should never be used in anyone under 18 years of age  who is ill with a fever. It may cause severe liver damage.)  · Use nasal rinses or irrigation as instructed by your health care provider.  · Don't smoke. This can worsen symptoms.  Follow-up care  Follow up with your healthcare provider or our staff if you are not improving within the next week.  When to seek medical advice  Call your healthcare provider if any of these occur:  · Green or yellow discharge from the nose or into the throat  · Facial pain or headache becoming more severe  · Stiff neck  · Unusual drowsiness or confusion  · Swelling of the forehead or eyelids  · Vision problems, including blurred or double vision  · Fever of 100.4ºF (38ºC) or higher, or as directed by your healthcare provider  · Seizure  · Breathing problems  · Symptoms not resolving within 10 days  Date Last Reviewed: 4/13/2015  © 7280-5772 [employer]. [address] [place] [address]. All rights reserved. This information is not intended as a substitute for professional medical care. Always follow your healthcare professional's instructions.      Patient Instructions   -Below are suggestions for symptomatic relief:              -Tylenol every 4 hours OR ibuprofen every 6 hours as needed for pain/fever.              -Salt water gargles to soothe throat pain.              -Chloroseptic spray also helps to numb throat pain.              -Nasal saline spray reduces inflammation and dryness.              -Warm face compresses to help with facial sinus pain/pressure.              -Vicks vapor rub at night.              -Flonase OTC or Nasacort OTC for nasal congestion.              -Simple foods like chicken noodle soup.              -Delsym helps with coughing at night              -Zyrtec/Claritin during the day & Benadryl at night may help with allergies.                If you DO NOT have Hypertension or any history of palpitations, it is ok to take over the counter Sudafed or Mucinex D or Allegra-D or Claritin-D  or Zyrtec-D.  If you do take one of the above, it is ok to combine that with plain over the counter Mucinex or Allegra or Claritin or Zyrtec. If, for example, you are taking Zyrtec -D, you can combine that with Mucinex, but not Mucinex-D.  If you are taking Mucinex-D, you can combine that with plain Allegra or Claritin or Zyrtec.   If you DO have Hypertension or palpitations, it is safe to take Coricidin HBP for relief of sinus symptoms.      Please follow up with your Primary care provider within 2-5 days if your signs and symptoms have not resolved or worsen.     If your condition worsens or fails to improve we recommend that you receive another evaluation at the emergency room immediately or contact your primary medical clinic to discuss your concerns.   You must understand that you have received an Urgent Care treatment only and that you may be released before all of your medical problems are known or treated. You, the patient, will arrange for follow up care as instructed.     RED FLAGS/WARNING SYMPTOMS DISCUSSED WITH PATIENT THAT WOULD WARRANT EMERGENT MEDICAL ATTENTION. PATIENT VERBALIZED UNDERSTANDING.

## 2020-11-12 ENCOUNTER — OFFICE VISIT (OUTPATIENT)
Dept: INTERNAL MEDICINE | Facility: CLINIC | Age: 50
End: 2020-11-12
Payer: COMMERCIAL

## 2020-11-12 VITALS
SYSTOLIC BLOOD PRESSURE: 116 MMHG | HEART RATE: 60 BPM | OXYGEN SATURATION: 99 % | WEIGHT: 143.19 LBS | BODY MASS INDEX: 20.5 KG/M2 | DIASTOLIC BLOOD PRESSURE: 74 MMHG | HEIGHT: 70 IN

## 2020-11-12 DIAGNOSIS — R10.32 LEFT LOWER QUADRANT ABDOMINAL PAIN: ICD-10-CM

## 2020-11-12 DIAGNOSIS — F32.89 OTHER DEPRESSION: ICD-10-CM

## 2020-11-12 DIAGNOSIS — Z00.00 ENCOUNTER FOR PREVENTIVE HEALTH EXAMINATION: Primary | ICD-10-CM

## 2020-11-12 DIAGNOSIS — K59.00 CONSTIPATION, UNSPECIFIED CONSTIPATION TYPE: ICD-10-CM

## 2020-11-12 PROCEDURE — 99999 PR PBB SHADOW E&M-EST. PATIENT-LVL V: CPT | Mod: PBBFAC,,, | Performed by: PHYSICIAN ASSISTANT

## 2020-11-12 PROCEDURE — 99396 PR PREVENTIVE VISIT,EST,40-64: ICD-10-PCS | Mod: S$GLB,,, | Performed by: PHYSICIAN ASSISTANT

## 2020-11-12 PROCEDURE — 3078F DIAST BP <80 MM HG: CPT | Mod: CPTII,S$GLB,, | Performed by: PHYSICIAN ASSISTANT

## 2020-11-12 PROCEDURE — 3008F PR BODY MASS INDEX (BMI) DOCUMENTED: ICD-10-PCS | Mod: CPTII,S$GLB,, | Performed by: PHYSICIAN ASSISTANT

## 2020-11-12 PROCEDURE — 99396 PREV VISIT EST AGE 40-64: CPT | Mod: S$GLB,,, | Performed by: PHYSICIAN ASSISTANT

## 2020-11-12 PROCEDURE — 99999 PR PBB SHADOW E&M-EST. PATIENT-LVL V: ICD-10-PCS | Mod: PBBFAC,,, | Performed by: PHYSICIAN ASSISTANT

## 2020-11-12 PROCEDURE — 1125F PR PAIN SEVERITY QUANTIFIED, PAIN PRESENT: ICD-10-PCS | Mod: S$GLB,,, | Performed by: PHYSICIAN ASSISTANT

## 2020-11-12 PROCEDURE — 3074F PR MOST RECENT SYSTOLIC BLOOD PRESSURE < 130 MM HG: ICD-10-PCS | Mod: CPTII,S$GLB,, | Performed by: PHYSICIAN ASSISTANT

## 2020-11-12 PROCEDURE — 3074F SYST BP LT 130 MM HG: CPT | Mod: CPTII,S$GLB,, | Performed by: PHYSICIAN ASSISTANT

## 2020-11-12 PROCEDURE — 3078F PR MOST RECENT DIASTOLIC BLOOD PRESSURE < 80 MM HG: ICD-10-PCS | Mod: CPTII,S$GLB,, | Performed by: PHYSICIAN ASSISTANT

## 2020-11-12 PROCEDURE — 3008F BODY MASS INDEX DOCD: CPT | Mod: CPTII,S$GLB,, | Performed by: PHYSICIAN ASSISTANT

## 2020-11-12 PROCEDURE — 1125F AMNT PAIN NOTED PAIN PRSNT: CPT | Mod: S$GLB,,, | Performed by: PHYSICIAN ASSISTANT

## 2020-11-12 RX ORDER — AMOXICILLIN AND CLAVULANATE POTASSIUM 875; 125 MG/1; MG/1
1 TABLET, FILM COATED ORAL EVERY 12 HOURS
Qty: 20 TABLET | Refills: 0 | Status: ON HOLD | OUTPATIENT
Start: 2020-11-12 | End: 2020-12-17 | Stop reason: HOSPADM

## 2020-11-12 RX ORDER — ESCITALOPRAM OXALATE 10 MG/1
10 TABLET ORAL DAILY
Qty: 90 TABLET | Refills: 4 | Status: SHIPPED | OUTPATIENT
Start: 2020-11-12 | End: 2021-11-15

## 2020-11-12 NOTE — PROGRESS NOTES
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 female.        Chief Complaint: Annual Exam    Jessica Nelson MD is an established patient of Joya Terry MD (Inactive) here today for annual exam.    LLQ abdominal pain - initially more pelvic, now a bit higher, thought UTI or possible ovarian cyst, now concern for possible diverticulitis, started about 10 days ago, no fever, no nausea or vomiting, no diarrhea, no bowel movement x 2 days, felt bloated, then had a bowel movement which helped a little, with pushing LLQ feels some pressure in rectum, no urinary sx, no fever    Varicose veins - RLE for several years, one episode of phlebitis that resolved, now wearing compression stockings, which are helping, s/p R GSV evlt + stabs     HTN - taking coreg 3.125 now only once daily, previously intolerant of amlodipine 2.5 mg/day (headache), lisinopril (cough), losartan (fatigue), and dyazide (37.5-25 mg) 1/4 tablet daily (excessive urination and dehydration), blood pressure well controlled on current regimen and much better since stopping OCP      History of subclinical hypothyroidism - asx     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     Depression - stable on lexapro 10 mg - 1/2 tablet/day      Family history of breast cancer - mother dx at age 67, mammogram is up-to-date    Health maintenance - she will get a flu shot and schedule Pap smear/GYN exam          Review of Systems   Constitutional: Negative for chills, diaphoresis, fatigue and fever.   HENT: Negative for congestion and sore throat.    Eyes: Negative for visual disturbance.   Respiratory: Negative for cough, chest tightness and shortness of breath.    Cardiovascular: Negative for chest pain, palpitations and leg swelling.   Gastrointestinal: Positive for abdominal pain and constipation. Negative for blood in stool, diarrhea, nausea and vomiting.   Genitourinary: Negative for dysuria, frequency, hematuria and urgency.    Musculoskeletal: Negative for arthralgias and back pain.   Skin: Negative for rash.   Neurological: Negative for dizziness, syncope, weakness and headaches.   Psychiatric/Behavioral: Negative for dysphoric mood and sleep disturbance. The patient is not nervous/anxious.        Objective:      Physical Exam  Vitals signs and nursing note reviewed.   Constitutional:       Appearance: Normal appearance. She is well-developed.   HENT:      Head: Normocephalic.      Right Ear: External ear normal.      Left Ear: External ear normal.   Eyes:      Pupils: Pupils are equal, round, and reactive to light.   Cardiovascular:      Rate and Rhythm: Normal rate and regular rhythm.      Heart sounds: Normal heart sounds. No murmur. No friction rub. No gallop.    Pulmonary:      Effort: Pulmonary effort is normal. No respiratory distress.      Breath sounds: Normal breath sounds.   Abdominal:      Palpations: Abdomen is soft.      Tenderness: There is abdominal tenderness (feeling of pressure upon palpation of LLQ abdomen).   Skin:     General: Skin is warm and dry.   Neurological:      Mental Status: She is alert.         Assessment:       1. Encounter for preventive health examination    2. Left lower quadrant abdominal pain    3. Other depression    4. Constipation, unspecified constipation type        Plan:       Jessica was seen today for annual exam.    Diagnoses and all orders for this visit:    Encounter for preventive health examination  -     TSH; Future  -     CBC Auto Differential; Future  -     Comprehensive Metabolic Panel; Future  -     Iron and TIBC; Future  -     Ferritin; Future  -     Vitamin D; Future  -     Lipid Panel; Future  -     T4, Free; Future    Left lower quadrant abdominal pain - LLQ abdominal pain - start tx with augmentin for diverticulitis, schedule CT scan  -     CT Abdomen Pelvis With Contrast; Future  -     START: amoxicillin-clavulanate 875-125mg (AUGMENTIN) 875-125 mg per tablet; Take 1 tablet by  "mouth every 12 (twelve) hours.        -     Case Request Endoscopy: COLONOSCOPY - diagnostic    Other depression  -     REFILL: escitalopram oxalate (LEXAPRO) 10 MG tablet; Take 1 tablet (10 mg total) by mouth once daily.    Constipation, unspecified constipation type  -     Case Request Endoscopy: COLONOSCOPY - diagnostic    She will get flu shot and schedule Pap smear  CT scan to evaluate LLQ abdominal pain present x 10 days  Diagnostic colonoscopy ordered given constipation and LLQ abdominal pain    Pt has been given instructions populated from [employer] database and has verbalized understanding of the after visit summary and information contained wherein.    Follow up with a primary care provider. May go to ER for acute shortness of breath, lightheadedness, fever, or any other emergent complaints or changes in condition.    "This note will be shared with the patient"    Pt has been given instructions populated from [employer] database and has verbalized understanding of the after visit summary and information contained wherein.    Follow up with a primary care provider. May go to ER for acute shortness of breath, lightheadedness, fever, or any other emergent complaints or changes in condition.    "This note will be shared with the patient"    Future Appointments   Date Time Provider Department Center   11/20/2020  3:00 PM Félix Orellana MD [facility] RADHA Tejeda   11/25/2020  3:00 PM VASCULAR, LAB [facility] ATTILA Tejeda   11/25/2020  3:30 PM Félix Orellana MD [facility] RADHA Tejeda                 "

## 2020-11-13 ENCOUNTER — LAB VISIT (OUTPATIENT)
Dept: LAB | Facility: HOSPITAL | Age: 50
End: 2020-11-13
Payer: COMMERCIAL

## 2020-11-13 DIAGNOSIS — Z00.00 ENCOUNTER FOR PREVENTIVE HEALTH EXAMINATION: ICD-10-CM

## 2020-11-13 LAB
25(OH)D3+25(OH)D2 SERPL-MCNC: 30 NG/ML (ref 30–96)
ALBUMIN SERPL BCP-MCNC: 4 G/DL (ref 3.5–5.2)
ALP SERPL-CCNC: 47 U/L (ref 55–135)
ALT SERPL W/O P-5'-P-CCNC: 23 U/L (ref 10–44)
ANION GAP SERPL CALC-SCNC: 6 MMOL/L (ref 8–16)
AST SERPL-CCNC: 28 U/L (ref 10–40)
BASOPHILS # BLD AUTO: 0.02 K/UL (ref 0–0.2)
BASOPHILS NFR BLD: 0.6 % (ref 0–1.9)
BILIRUB SERPL-MCNC: 0.8 MG/DL (ref 0.1–1)
BUN SERPL-MCNC: 18 MG/DL (ref 6–20)
CALCIUM SERPL-MCNC: 9.3 MG/DL (ref 8.7–10.5)
CHLORIDE SERPL-SCNC: 105 MMOL/L (ref 95–110)
CHOLEST SERPL-MCNC: 250 MG/DL (ref 120–199)
CHOLEST/HDLC SERPL: 2.5 {RATIO} (ref 2–5)
CO2 SERPL-SCNC: 28 MMOL/L (ref 23–29)
CREAT SERPL-MCNC: 0.8 MG/DL (ref 0.5–1.4)
DIFFERENTIAL METHOD: ABNORMAL
EOSINOPHIL # BLD AUTO: 0.2 K/UL (ref 0–0.5)
EOSINOPHIL NFR BLD: 4.9 % (ref 0–8)
ERYTHROCYTE [DISTWIDTH] IN BLOOD BY AUTOMATED COUNT: 12.5 % (ref 11.5–14.5)
EST. GFR  (AFRICAN AMERICAN): >60 ML/MIN/1.73 M^2
EST. GFR  (NON AFRICAN AMERICAN): >60 ML/MIN/1.73 M^2
FERRITIN SERPL-MCNC: 83 NG/ML (ref 20–300)
GLUCOSE SERPL-MCNC: 79 MG/DL (ref 70–110)
HCT VFR BLD AUTO: 37.6 % (ref 37–48.5)
HDLC SERPL-MCNC: 101 MG/DL (ref 40–75)
HDLC SERPL: 40.4 % (ref 20–50)
HGB BLD-MCNC: 12.1 G/DL (ref 12–16)
IMM GRANULOCYTES # BLD AUTO: 0.01 K/UL (ref 0–0.04)
IMM GRANULOCYTES NFR BLD AUTO: 0.3 % (ref 0–0.5)
IRON SERPL-MCNC: 116 UG/DL (ref 30–160)
LDLC SERPL CALC-MCNC: 138.4 MG/DL (ref 63–159)
LYMPHOCYTES # BLD AUTO: 1.2 K/UL (ref 1–4.8)
LYMPHOCYTES NFR BLD: 35.8 % (ref 18–48)
MCH RBC QN AUTO: 29.4 PG (ref 27–31)
MCHC RBC AUTO-ENTMCNC: 32.2 G/DL (ref 32–36)
MCV RBC AUTO: 91 FL (ref 82–98)
MONOCYTES # BLD AUTO: 0.4 K/UL (ref 0.3–1)
MONOCYTES NFR BLD: 11 % (ref 4–15)
NEUTROPHILS # BLD AUTO: 1.6 K/UL (ref 1.8–7.7)
NEUTROPHILS NFR BLD: 47.4 % (ref 38–73)
NONHDLC SERPL-MCNC: 149 MG/DL
NRBC BLD-RTO: 0 /100 WBC
PLATELET # BLD AUTO: 215 K/UL (ref 150–350)
PMV BLD AUTO: 10.5 FL (ref 9.2–12.9)
POTASSIUM SERPL-SCNC: 4.6 MMOL/L (ref 3.5–5.1)
PROT SERPL-MCNC: 7.1 G/DL (ref 6–8.4)
RBC # BLD AUTO: 4.12 M/UL (ref 4–5.4)
SATURATED IRON: 35 % (ref 20–50)
SODIUM SERPL-SCNC: 139 MMOL/L (ref 136–145)
T4 FREE SERPL-MCNC: 0.85 NG/DL (ref 0.71–1.51)
TOTAL IRON BINDING CAPACITY: 336 UG/DL (ref 250–450)
TRANSFERRIN SERPL-MCNC: 227 MG/DL (ref 200–375)
TRIGL SERPL-MCNC: 53 MG/DL (ref 30–150)
TSH SERPL DL<=0.005 MIU/L-ACNC: 5.09 UIU/ML (ref 0.4–4)
WBC # BLD AUTO: 3.27 K/UL (ref 3.9–12.7)

## 2020-11-13 PROCEDURE — 85025 COMPLETE CBC W/AUTO DIFF WBC: CPT

## 2020-11-13 PROCEDURE — 80061 LIPID PANEL: CPT

## 2020-11-13 PROCEDURE — 84443 ASSAY THYROID STIM HORMONE: CPT

## 2020-11-13 PROCEDURE — 84439 ASSAY OF FREE THYROXINE: CPT

## 2020-11-13 PROCEDURE — 82306 VITAMIN D 25 HYDROXY: CPT

## 2020-11-13 PROCEDURE — 82728 ASSAY OF FERRITIN: CPT

## 2020-11-13 PROCEDURE — 80053 COMPREHEN METABOLIC PANEL: CPT

## 2020-11-13 PROCEDURE — 83540 ASSAY OF IRON: CPT

## 2020-11-13 PROCEDURE — 36415 COLL VENOUS BLD VENIPUNCTURE: CPT

## 2020-11-16 ENCOUNTER — HOSPITAL ENCOUNTER (OUTPATIENT)
Dept: RADIOLOGY | Facility: HOSPITAL | Age: 50
Discharge: HOME | End: 2020-11-16
Attending: PHYSICIAN ASSISTANT
Payer: COMMERCIAL

## 2020-11-16 DIAGNOSIS — R10.32 LEFT LOWER QUADRANT ABDOMINAL PAIN: ICD-10-CM

## 2020-11-16 PROCEDURE — 74177 CT ABD & PELVIS W/CONTRAST: CPT | Mod: TC

## 2020-11-16 PROCEDURE — 25500020 PHARM REV CODE 255: Performed by: PHYSICIAN ASSISTANT

## 2020-11-16 PROCEDURE — 74177 CT ABD & PELVIS W/CONTRAST: CPT | Mod: 26,,, | Performed by: RADIOLOGY

## 2020-11-16 PROCEDURE — 74177 CT ABDOMEN PELVIS WITH CONTRAST: ICD-10-PCS | Mod: 26,,, | Performed by: RADIOLOGY

## 2020-11-16 RX ADMIN — IOHEXOL 15 ML: 350 INJECTION, SOLUTION INTRAVENOUS at 05:11

## 2020-11-16 RX ADMIN — IOHEXOL 75 ML: 350 INJECTION, SOLUTION INTRAVENOUS at 06:11

## 2020-11-19 ENCOUNTER — TELEPHONE (OUTPATIENT)
Dept: INTERNAL MEDICINE | Facility: CLINIC | Age: 50
End: 2020-11-19

## 2020-11-19 DIAGNOSIS — I87.2 VENOUS INSUFFICIENCY: Primary | ICD-10-CM

## 2020-11-19 DIAGNOSIS — Z12.11 COLON CANCER SCREENING: Primary | ICD-10-CM

## 2020-11-19 DIAGNOSIS — Z01.818 PRE-OP TESTING: Primary | ICD-10-CM

## 2020-11-19 RX ORDER — SODIUM, POTASSIUM,MAG SULFATES 17.5-3.13G
SOLUTION, RECONSTITUTED, ORAL ORAL
Qty: 1 KIT | Refills: 0 | Status: SHIPPED | OUTPATIENT
Start: 2020-11-19 | End: 2023-05-04 | Stop reason: HOSPADM

## 2020-11-20 ENCOUNTER — PROCEDURE VISIT (OUTPATIENT)
Dept: VASCULAR SURGERY | Facility: CLINIC | Age: 50
End: 2020-11-20
Payer: COMMERCIAL

## 2020-11-20 VITALS
TEMPERATURE: 98 F | BODY MASS INDEX: 20.8 KG/M2 | WEIGHT: 145.31 LBS | HEIGHT: 70 IN | DIASTOLIC BLOOD PRESSURE: 82 MMHG | SYSTOLIC BLOOD PRESSURE: 131 MMHG | HEART RATE: 51 BPM

## 2020-11-20 DIAGNOSIS — I87.2 VENOUS INSUFFICIENCY (CHRONIC) (PERIPHERAL): Primary | ICD-10-CM

## 2020-11-20 DIAGNOSIS — I87.2 VENOUS INSUFFICIENCY: ICD-10-CM

## 2020-11-20 PROCEDURE — 36478 PR ENDOVENOUS LASER, 1ST VEIN: ICD-10-PCS | Mod: LT,S$GLB,, | Performed by: SURGERY

## 2020-11-20 PROCEDURE — 36478 ENDOVENOUS LASER 1ST VEIN: CPT | Mod: LT,S$GLB,, | Performed by: SURGERY

## 2020-11-20 NOTE — PROCEDURES
Jessica Nelson MD; 11/20/2020    Pre-Procedure Diagnosis: Left greater saphenous vein reflux; significant superficial venous insufficiency    Post-Procedure Diagnsosis: Same    Procedure: Laser endovenous ablation of the left greater saphenous vein    Surgeon: Félix Orellana MD [facility]     Anesthesia: Local    The skin of the leg was prepped and draped in sterile fashion.  Ultrasound-guidance was used throughout the procedure with a portable duplex ultrasound machine.  The GSV was cannulated below the knee using a micro-puncture system.  An 0.035 wire J-tip followed by a 4-Fr Angiodynamics sheath was placed throughout the left GSV.   Using tumescent anesthesia, the entire sheathed portion of the GSV was anesthesized keeping the vein at least one cm from the skin; Klein pump was used.  Position of the tip of the laser was then reconfirmed to be approximately 2 cm from the saphenofemoral junction.  The 1470 nm laser was then activated and the entire length of the vein was treated at ~ 49 J/cm.  The fiber and sheath were then removed intact.  Duplex confirmed occlusion of the GSV with continued patency of the common femoral vein.   The incision was closed with Steri-strips.   Sterile compression dressings and a compression stocking were applied and the patient was discharged to home in a satisfactory condition.    Cannulation site: Below-the-knee    Sheath length: 50 cm    [place] of power: 7 W    Laser time: 400.1 sec    Joules: 2800 J         Félix Orellana MD DFSVS [facility]   Vascular & Endovascular Surgery

## 2020-11-25 ENCOUNTER — OFFICE VISIT (OUTPATIENT)
Dept: VASCULAR SURGERY | Facility: CLINIC | Age: 50
End: 2020-11-25
Payer: COMMERCIAL

## 2020-11-25 ENCOUNTER — HOSPITAL ENCOUNTER (OUTPATIENT)
Dept: VASCULAR SURGERY | Facility: CLINIC | Age: 50
Discharge: HOME | End: 2020-11-25
Attending: SURGERY
Payer: COMMERCIAL

## 2020-11-25 VITALS
DIASTOLIC BLOOD PRESSURE: 81 MMHG | SYSTOLIC BLOOD PRESSURE: 124 MMHG | TEMPERATURE: 98 F | WEIGHT: 141.13 LBS | HEIGHT: 70 IN | BODY MASS INDEX: 20.21 KG/M2 | HEART RATE: 56 BPM

## 2020-11-25 DIAGNOSIS — I87.2 VENOUS INSUFFICIENCY: Primary | ICD-10-CM

## 2020-11-25 DIAGNOSIS — I87.2 VENOUS INSUFFICIENCY: ICD-10-CM

## 2020-11-25 PROCEDURE — 3074F PR MOST RECENT SYSTOLIC BLOOD PRESSURE < 130 MM HG: ICD-10-PCS | Mod: CPTII,S$GLB,, | Performed by: SURGERY

## 2020-11-25 PROCEDURE — 3079F PR MOST RECENT DIASTOLIC BLOOD PRESSURE 80-89 MM HG: ICD-10-PCS | Mod: CPTII,S$GLB,, | Performed by: SURGERY

## 2020-11-25 PROCEDURE — 1126F AMNT PAIN NOTED NONE PRSNT: CPT | Mod: S$GLB,,, | Performed by: SURGERY

## 2020-11-25 PROCEDURE — 99999 PR PBB SHADOW E&M-EST. PATIENT-LVL III: CPT | Mod: PBBFAC,,, | Performed by: SURGERY

## 2020-11-25 PROCEDURE — 93971 PR US DUPLEX, UPPER OR LOWER EXT VENOUS,UNILAT OR LTD: ICD-10-PCS | Mod: S$GLB,,, | Performed by: SURGERY

## 2020-11-25 PROCEDURE — 3008F BODY MASS INDEX DOCD: CPT | Mod: CPTII,S$GLB,, | Performed by: SURGERY

## 2020-11-25 PROCEDURE — 99999 PR PBB SHADOW E&M-EST. PATIENT-LVL III: ICD-10-PCS | Mod: PBBFAC,,, | Performed by: SURGERY

## 2020-11-25 PROCEDURE — 99213 PR OFFICE/OUTPT VISIT, EST, LEVL III, 20-29 MIN: ICD-10-PCS | Mod: S$GLB,,, | Performed by: SURGERY

## 2020-11-25 PROCEDURE — 3079F DIAST BP 80-89 MM HG: CPT | Mod: CPTII,S$GLB,, | Performed by: SURGERY

## 2020-11-25 PROCEDURE — 3074F SYST BP LT 130 MM HG: CPT | Mod: CPTII,S$GLB,, | Performed by: SURGERY

## 2020-11-25 PROCEDURE — 99213 OFFICE O/P EST LOW 20 MIN: CPT | Mod: S$GLB,,, | Performed by: SURGERY

## 2020-11-25 PROCEDURE — 1126F PR PAIN SEVERITY QUANTIFIED, NO PAIN PRESENT: ICD-10-PCS | Mod: S$GLB,,, | Performed by: SURGERY

## 2020-11-25 PROCEDURE — 3008F PR BODY MASS INDEX (BMI) DOCUMENTED: ICD-10-PCS | Mod: CPTII,S$GLB,, | Performed by: SURGERY

## 2020-11-25 PROCEDURE — 93971 EXTREMITY STUDY: CPT | Mod: S$GLB,,, | Performed by: SURGERY
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
## 2020-11-27 ENCOUNTER — TELEPHONE (OUTPATIENT)
Dept: VASCULAR SURGERY | Facility: CLINIC | Age: 50
End: 2020-11-27

## 2020-11-27 NOTE — TELEPHONE ENCOUNTER
----- Message from Lory Berumen LPN sent at 11/27/2020 11:24 AM CST -----  Regarding: FW: NP Appointment  Contact: Patient Called [phone]  Doug desai, I spoke with Dr Plaza regarding Dr Jessica Nelson. He would like for you to reach out to her and schedule a clinic appointment.     Thanks,  -RIMMA  ----- Message -----  From: Carmen Ronquillo  Sent: 11/27/2020  10:31 AM CST  To: B[facility] Sures[facility] Staff  Subject: NP Appointment                                   Patient is being referred to clinic by Dr Orellana for Varicose Veins.  Contact patient at [phone] to discuss and possibly schedule.

## 2020-11-27 NOTE — TELEPHONE ENCOUNTER
----- Message from Carmen Ronquillo sent at 11/27/2020 10:31 AM CST -----  Regarding: NP Appointment  Contact: Patient Called [phone]  Patient is being referred to clinic by Dr Orellana for Varicose Veins.  Contact patient at [phone] to discuss and possibly schedule.

## 2020-11-27 NOTE — TELEPHONE ENCOUNTER
Spoke with Dr Nelson, [phone] for Marley Villa,RN given to schedule appointment at the [facility] wiith Dr EDGAR Plaza. Dr EDGAR Plaza notified. In basket message forward to [facility] Staff/Marley Villa RN.

## 2020-11-27 NOTE — TELEPHONE ENCOUNTER
Spoke with Dr. Nelson. She is in clinic at [facility] until 2:30. Scheduled for 2:45 but instructed to come in when ever she finishes her clinic. She had previous EVLT's with Dr. Orellana and would like to discuss Sclerotherapy with Dr. Plaza.

## 2020-12-02 ENCOUNTER — INITIAL CONSULT (OUTPATIENT)
Dept: VASCULAR SURGERY | Facility: CLINIC | Age: 50
End: 2020-12-02
Payer: COMMERCIAL

## 2020-12-02 VITALS — SYSTOLIC BLOOD PRESSURE: 123 MMHG | HEART RATE: 53 BPM | DIASTOLIC BLOOD PRESSURE: 78 MMHG

## 2020-12-02 DIAGNOSIS — I83.899 SYMPTOMATIC RETICULAR VEINS: ICD-10-CM

## 2020-12-02 DIAGNOSIS — I87.2 VENOUS INSUFFICIENCY: Primary | ICD-10-CM

## 2020-12-02 DIAGNOSIS — I83.891 SYMPTOMATIC VARICOSE VEINS OF RIGHT LOWER EXTREMITY: Primary | ICD-10-CM

## 2020-12-02 PROCEDURE — 3078F PR MOST RECENT DIASTOLIC BLOOD PRESSURE < 80 MM HG: ICD-10-PCS | Mod: CPTII,S$GLB,, | Performed by: SURGERY

## 2020-12-02 PROCEDURE — 99212 OFFICE O/P EST SF 10 MIN: CPT | Mod: S$GLB,,, | Performed by: SURGERY

## 2020-12-02 PROCEDURE — 3074F PR MOST RECENT SYSTOLIC BLOOD PRESSURE < 130 MM HG: ICD-10-PCS | Mod: CPTII,S$GLB,, | Performed by: SURGERY

## 2020-12-02 PROCEDURE — 3074F SYST BP LT 130 MM HG: CPT | Mod: CPTII,S$GLB,, | Performed by: SURGERY

## 2020-12-02 PROCEDURE — 3078F DIAST BP <80 MM HG: CPT | Mod: CPTII,S$GLB,, | Performed by: SURGERY

## 2020-12-02 PROCEDURE — 99212 PR OFFICE/OUTPT VISIT, EST, LEVL II, 10-19 MIN: ICD-10-PCS | Mod: S$GLB,,, | Performed by: SURGERY
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
## 2020-12-07 DIAGNOSIS — F41.9 ANXIETY DUE TO INVASIVE PROCEDURE: Primary | ICD-10-CM

## 2020-12-07 NOTE — TELEPHONE ENCOUNTER
Spoke with pt and informed approved by insurance for upcoming procedure. Rx will be sent to [facility] and pending signature for Dr. Plaza.  Informed she will need a ride if taking the xanax. She had no other questions.

## 2020-12-08 RX ORDER — ALPRAZOLAM 0.5 MG/1
TABLET ORAL
Qty: 2 TABLET | Refills: 0 | Status: SHIPPED | OUTPATIENT
Start: 2020-12-08 | End: 2022-04-07

## 2020-12-14 ENCOUNTER — LAB VISIT (OUTPATIENT)
Dept: INTERNAL MEDICINE | Facility: CLINIC | Age: 50
End: 2020-12-14
Payer: COMMERCIAL

## 2020-12-14 DIAGNOSIS — Z01.818 PRE-OP TESTING: ICD-10-CM

## 2020-12-14 PROCEDURE — U0003 INFECTIOUS AGENT DETECTION BY NUCLEIC ACID (DNA OR RNA); SEVERE ACUTE RESPIRATORY SYNDROME CORONAVIRUS 2 (SARS-COV-2) (CORONAVIRUS DISEASE [COVID-19]), AMPLIFIED PROBE TECHNIQUE, MAKING USE OF HIGH THROUGHPUT TECHNOLOGIES AS DESCRIBED BY CMS-2020-01-R: HCPCS

## 2020-12-15 LAB — SARS-COV-2 RNA RESP QL NAA+PROBE: NOT DETECTED

## 2020-12-16 ENCOUNTER — PROCEDURE VISIT (OUTPATIENT)
Dept: VASCULAR SURGERY | Facility: CLINIC | Age: 50
End: 2020-12-16
Attending: SURGERY
Payer: COMMERCIAL

## 2020-12-16 VITALS — SYSTOLIC BLOOD PRESSURE: 131 MMHG | DIASTOLIC BLOOD PRESSURE: 83 MMHG | HEART RATE: 55 BPM

## 2020-12-16 DIAGNOSIS — I83.891 SYMPTOMATIC VARICOSE VEINS OF RIGHT LOWER EXTREMITY: ICD-10-CM

## 2020-12-16 DIAGNOSIS — I87.2 VENOUS INSUFFICIENCY OF BOTH LOWER EXTREMITIES: ICD-10-CM

## 2020-12-16 DIAGNOSIS — I87.2 VENOUS INSUFFICIENCY: ICD-10-CM

## 2020-12-16 DIAGNOSIS — Z91.89 AT HIGH RISK FOR PAIN FROM PROCEDURE: ICD-10-CM

## 2020-12-16 DIAGNOSIS — Z78.9 PRESENCE OF SURGICAL INCISION: Primary | ICD-10-CM

## 2020-12-16 PROCEDURE — 37765 PR PHLEB VEINS - EXTREM - TO 20: ICD-10-PCS | Mod: RT,S$GLB,, | Performed by: SURGERY

## 2020-12-16 PROCEDURE — 37765 STAB PHLEB VEINS XTR 10-20: CPT | Mod: RT,S$GLB,, | Performed by: SURGERY

## 2020-12-17 ENCOUNTER — HOSPITAL ENCOUNTER (OUTPATIENT)
Facility: HOSPITAL | Age: 50
Discharge: HOME | End: 2020-12-17
Attending: COLON & RECTAL SURGERY | Admitting: COLON & RECTAL SURGERY
Payer: COMMERCIAL

## 2020-12-17 ENCOUNTER — ANESTHESIA EVENT (OUTPATIENT)
Dept: ENDOSCOPY | Facility: HOSPITAL | Age: 50
End: 2020-12-17
Payer: COMMERCIAL

## 2020-12-17 ENCOUNTER — ANESTHESIA (OUTPATIENT)
Dept: ENDOSCOPY | Facility: HOSPITAL | Age: 50
End: 2020-12-17
Payer: COMMERCIAL

## 2020-12-17 VITALS
RESPIRATION RATE: 16 BRPM | HEIGHT: 70 IN | SYSTOLIC BLOOD PRESSURE: 120 MMHG | OXYGEN SATURATION: 100 % | BODY MASS INDEX: 20.04 KG/M2 | WEIGHT: 140 LBS | HEART RATE: 50 BPM | TEMPERATURE: 98 F | DIASTOLIC BLOOD PRESSURE: 72 MMHG

## 2020-12-17 DIAGNOSIS — Z12.11 SCREENING FOR COLON CANCER: ICD-10-CM

## 2020-12-17 PROCEDURE — 63600175 PHARM REV CODE 636 W HCPCS: Performed by: NURSE ANESTHETIST, CERTIFIED REGISTERED

## 2020-12-17 PROCEDURE — 37000009 HC ANESTHESIA EA ADD 15 MINS: Performed by: COLON & RECTAL SURGERY

## 2020-12-17 PROCEDURE — 25000003 PHARM REV CODE 250: Performed by: NURSE ANESTHETIST, CERTIFIED REGISTERED

## 2020-12-17 PROCEDURE — E9220 PRA ENDO ANESTHESIA: ICD-10-PCS | Mod: ,,, | Performed by: NURSE ANESTHETIST, CERTIFIED REGISTERED

## 2020-12-17 PROCEDURE — E9220 PRA ENDO ANESTHESIA: HCPCS | Mod: ,,, | Performed by: NURSE ANESTHETIST, CERTIFIED REGISTERED

## 2020-12-17 PROCEDURE — 37000008 HC ANESTHESIA 1ST 15 MINUTES: Performed by: COLON & RECTAL SURGERY

## 2020-12-17 PROCEDURE — 25000003 PHARM REV CODE 250: Performed by: COLON & RECTAL SURGERY

## 2020-12-17 PROCEDURE — G0121 COLON CA SCRN NOT HI RSK IND: HCPCS | Performed by: COLON & RECTAL SURGERY

## 2020-12-17 PROCEDURE — G0121 COLON CA SCRN NOT HI RSK IND: HCPCS | Mod: ,,, | Performed by: COLON & RECTAL SURGERY

## 2020-12-17 PROCEDURE — G0121 COLON CA SCRN NOT HI RSK IND: ICD-10-PCS | Mod: ,,, | Performed by: COLON & RECTAL SURGERY

## 2020-12-17 RX ORDER — PROPOFOL 10 MG/ML
VIAL (ML) INTRAVENOUS
Status: DISCONTINUED | OUTPATIENT
Start: 2020-12-17 | End: 2020-12-17

## 2020-12-17 RX ORDER — SODIUM CHLORIDE 9 MG/ML
INJECTION, SOLUTION INTRAVENOUS CONTINUOUS
Status: DISCONTINUED | OUTPATIENT
Start: 2020-12-17 | End: 2020-12-17 | Stop reason: HOSPADM

## 2020-12-17 RX ORDER — LIDOCAINE HYDROCHLORIDE 20 MG/ML
INJECTION INTRAVENOUS
Status: DISCONTINUED | OUTPATIENT
Start: 2020-12-17 | End: 2020-12-17

## 2020-12-17 RX ORDER — PROPOFOL 10 MG/ML
VIAL (ML) INTRAVENOUS CONTINUOUS PRN
Status: DISCONTINUED | OUTPATIENT
Start: 2020-12-17 | End: 2020-12-17

## 2020-12-17 RX ADMIN — SODIUM CHLORIDE: 0.9 INJECTION, SOLUTION INTRAVENOUS at 12:12

## 2020-12-17 RX ADMIN — PROPOFOL 20 MG: 10 INJECTION, EMULSION INTRAVENOUS at 12:12

## 2020-12-17 RX ADMIN — PROPOFOL 60 MG: 10 INJECTION, EMULSION INTRAVENOUS at 12:12

## 2020-12-17 RX ADMIN — LIDOCAINE HYDROCHLORIDE 60 MG: 20 INJECTION, SOLUTION INTRAVENOUS at 12:12

## 2020-12-17 RX ADMIN — PROPOFOL 200 MCG/KG/MIN: 10 INJECTION, EMULSION INTRAVENOUS at 12:12

## 2020-12-17 NOTE — DISCHARGE INSTRUCTIONS
Colonoscopy     A camera attached to a flexible tube with a viewing lens is used to take video pictures.     Colonoscopy is a test to view the inside of your lower digestive tract (colon and rectum). Sometimes it can show the last part of the small intestine (ileum). During the test, small pieces of tissue may be removed for testing. This is called a biopsy. Small growths, such as polyps, may also be removed.   Why is colonoscopy done?  The test is done to help look for colon cancer. And it can help find the source of abdominal pain, bleeding, and changes in bowel habits. It may be needed once a year, depending on factors such as your:  · Age  · Health history  · Family health history  · Symptoms  · Results from any prior colonoscopy  Risks and possible complications  These include:  · Bleeding               · A puncture or tear in the colon   · Risks of anesthesia  · A cancer lesion not being seen  Getting ready   To prepare for the test:  · Talk with your healthcare provider about the risks of the test (see below). Also ask your healthcare provider about alternatives to the test.  · Tell your healthcare provider about any medicines you take. Also tell him or her about any health conditions you may have.  · Make sure your rectum and colon are empty for the test. Follow the diet and bowel prep instructions exactly. If you dont, the test may need to be rescheduled.  · Plan for a friend or family member to drive you home after the test.     Colonoscopy provides an inside view of the entire colon.     You may discuss the results with your doctor right away or at a future visit.  During the test   The test is usually done in the hospital on an outpatient basis. This means you go home the same day. The procedure takes about 30 minutes. During that time:  · You are given relaxing (sedating) medicine through an IV line. You may be drowsy, or fully asleep.  · The healthcare provider will first give you a physical exam to  check for anal and rectal problems.  · Then the anus is lubricated and the scope inserted.  · If you are awake, you may have a feeling similar to needing to have a bowel movement. You may also feel pressure as air is pumped into the colon. Its OK to pass gas during the procedure.  · Biopsy, polyp removal, or other treatments may be done during the test.  After the test   You may have gas right after the test. It can help to try to pass it to help prevent later bloating. Your healthcare provider may discuss the results with you right away. Or you may need to schedule a follow-up visit to talk about the results. After the test, you can go back to your normal eating and other activities. You may be tired from the sedation and need to rest for a few hours.  Date Last Reviewed: 11/1/2016 © 2000-2017 [employer]. [address] [place] [address], [place] [address]. All rights reserved. This information is not intended as a substitute for professional medical care. Always follow your healthcare professional's instructions.

## 2020-12-17 NOTE — H&P
COLONOSCOPY HISTORY AND PHYSICAL EXAM    Procedure : Colonoscopy      INDICATIONS: asymptomatic screening exam, believes she had a recent episode of diverticulitis.    Family Hx of CRC: None    Last Colonoscopy:  None  Findings: N/A       Past Medical History:   Diagnosis Date    Alopecia areata 1990    Resolved. Possibly secondary to CMV    Benign essential HTN 7/29/2015    Bilateral retinal lattice degeneration     Nodular fasciitis 2005    LUE    Subclinical hypothyroidism 2012     Sedation Problems: NO  Family History   Problem Relation Age of Onset    Hypertension Mother     Hyperlipidemia Mother     Breast cancer Mother 67    Hypertension Father     Hyperlipidemia Father     Ovarian cancer Paternal Aunt     Thyroid disease Neg Hx     Amblyopia Neg Hx     Blindness Neg Hx     Cancer Neg Hx     Cataracts Neg Hx     Diabetes Neg Hx     Glaucoma Neg Hx     Macular degeneration Neg Hx     Retinal detachment Neg Hx     Strabismus Neg Hx     Stroke Neg Hx      Fam Hx of Sedation Problems: NO  Social History     Socioeconomic History    Marital status:      Spouse name: Not on file    Number of children: 2    Years of education: MD    Highest education level: Not on file   Occupational History    Occupation: General Internist     Employer: [employer]   Social Needs    Financial resource strain: Not on file    Food insecurity     Worry: Not on file     Inability: Not on file    Transportation needs     Medical: Not on file     Non-medical: Not on file   Tobacco Use    Smoking status: Never Smoker    Smokeless tobacco: Never Used   Substance and Sexual Activity    Alcohol use: Yes     Comment: Rare    Drug use: No    Sexual activity: Yes     Partners: Male     Birth control/protection: Partner-Vasectomy   Lifestyle    Physical activity     Days per week: Not on file     Minutes per session: Not on file    Stress: Not on file   Relationships    Social  "connections     Talks on phone: Not on file     Gets together: Not on file     Attends Druze service: Not on file     Active member of club or organization: Not on file     Attends meetings of clubs or organizations: Not on file     Relationship status: Not on file   Other Topics Concern    Not on file   Social History Narrative    Not on file       Review of Systems - Negative except   Respiratory ROS: no dyspnea  Cardiovascular ROS: no exertional chest pain  Gastrointestinal ROS: NO abdominal discomfort,  NO rectal bleeding  Musculoskeletal ROS: no muscular pain  Neurological ROS: no recent stroke    Physical Exam:  /72 (BP Location: Left arm, Patient Position: Lying)   Pulse (!) 55   Temp 97.9 °F (36.6 °C) (Temporal)   Resp 14   Ht 5' 10" (1.778 m)   Wt 63.5 kg (140 lb)   SpO2 99%   Breastfeeding No   BMI 20.09 kg/m²   General: no distress  Head: normocephalic  Mallampati Score   Neck: supple, symmetrical, trachea midline  Lungs:  clear to auscultation bilaterally and normal respiratory effort  Heart: regular rate and rhythm and no murmur  Abdomen: soft, non-tender non-distented; bowel sounds normal; no masses,  no organomegaly  Extremities: no cyanosis or edema, or clubbing      PLAN  COLONOSCOPY.    SedationPlan :MAC    The details of the procedure, the possible need for biopsy or polypectomy and the potential risks including bleeding, perforation, missed polyps were discussed in detail.      "

## 2020-12-17 NOTE — [employer] PATIENT INSTRUCTIONS
Discharge Summary/Instructions after an Endoscopic Procedure  Patient Name: Jessica Nelson  Patient MRN: [id number]  Patient YOB: 1970 Thursday, December 17, 2020  Ida Donaldson MD  RESTRICTIONS:  During your procedure today, you received medications for sedation.  These   medications may affect your judgment, balance and coordination.  Therefore,   for 24 hours, you have the following restrictions:   - DO NOT drive a car, operate machinery, make legal/financial decisions,   sign important papers or drink alcohol.    ACTIVITY:  Today: no heavy lifting, straining or running due to procedural   sedation/anesthesia.  The following day: return to full activity including work.  DIET:  Eat and drink normally unless instructed otherwise.     TREATMENT FOR COMMON SIDE EFFECTS:  - Mild abdominal pain, nausea, belching, bloating or excessive gas:  rest,   eat lightly and use a heating pad.  - Sore Throat: treat with throat lozenges and/or gargle with warm salt   water.  - Because air was used during the procedure, expelling large amounts of air   from your rectum or belching is normal.  - If a bowel prep was taken, you may not have a bowel movement for 1-3 days.    This is normal.  SYMPTOMS TO WATCH FOR AND REPORT TO YOUR PHYSICIAN:  1. Abdominal pain or bloating, other than gas cramps.  2. Chest pain.  3. Back pain.  4. Signs of infection such as: chills or fever occurring within 24 hours   after the procedure.  5. Rectal bleeding, which would show as bright red, maroon, or black stools.   (A tablespoon of blood from the rectum is not serious, especially if   hemorrhoids are present.)  6. Vomiting.  7. Weakness or dizziness.  GO DIRECTLY TO THE NEAREST EMERGENCY ROOM IF YOU HAVE ANY OF THE FOLLOWING:      Difficulty breathing              Chills and/or fever over 101 F   Persistent vomiting and/or vomiting blood   Severe abdominal pain   Severe chest pain   Black, tarry stools   Bleeding- more than one  tablespoon   Any other symptom or condition that you feel may need urgent attention  Your doctor recommends these additional instructions:  If any biopsies were taken, your doctors clinic will contact you in 1 to 2   weeks with any results.  - Discharge patient to home.   - Resume previous diet.   - Continue present medications.   - Repeat colonoscopy in 10 years for screening purposes.   - Return to referring physician.   - Written discharge instructions were provided to the patient.   - The signs and symptoms of potential delayed complications were discussed   with the patient.   - Patient has a contact number available for emergencies.   - Return to normal activities tomorrow.  For questions, problems or results please call your physician - Ida Donaldson MD at Work:  [phone].  [facility], EMERGENCY ROOM PHONE NUMBER: [phone]  IF A COMPLICATION OR EMERGENCY SITUATION ARISES AND YOU ARE UNABLE TO REACH   YOUR PHYSICIAN - GO DIRECTLY TO THE EMERGENCY ROOM.  Ida Donaldson MD  12/17/2020 1:05:38 PM  This report has been verified and signed electronically.  [employer]

## 2020-12-17 NOTE — ANESTHESIA PREPROCEDURE EVALUATION
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    Anesthesia Evaluation    I have reviewed the Patient Summary Reports.    I have reviewed the Nursing Notes. I have reviewed the NPO Status.   I have reviewed the Medications.     Review of Systems  Anesthesia Hx:  No problems with previous Anesthesia   Denies Personal Hx of Anesthesia complications.   Social:  Non-Smoker  Denies Tobacco Use.   Cardiovascular:   Hypertension, well controlled Denies MI.  Denies CAD.    Denies CABG/stent.  Denies Dysrhythmias.  no hyperlipidemia  Denies Coronary Artery Disease.  Hypertension, Essential Hypertension , Pt in optimal range, systolic < 120 and diastolic < 80, Well Controlled on Rx    Pulmonary:   Denies COPD.  Denies Asthma.  Denies Shortness of breath.  Denies Recent URI.  Denies Asthma.  Denies Chronic Obstructive Pulmonary Disease (COPD).    Renal/:   Denies Chronic Renal Disease.   Denies Kidney Function/Disease    Hepatic/GI:   Bowel Prep. Denies GERD. Denies Liver Disease.  Denies Esophageal / Stomach Disorders  Denies Liver Disease    Neurological:   Denies TIA. Denies CVA. Denies Seizures.  Denies Seizure Disorder  Denies CVA - Cerebrovasular Accident  Denies TIA - Transient Ischemic Attack    Endocrine:   Denies Diabetes. Hypothyroidism  Denies Diabetes  Denies Thyroid Disease  Denies Metabolic Disorders      Physical Exam  General:  Well nourished    Airway/Jaw/Neck:  Airway Findings: Mouth Opening: Normal Tongue: Normal  General Airway Assessment: Adult  Mallampati: III  Improves to II with phonation.  TM Distance: 4 - 6 cm      Dental:  Dental Findings: In tact   Chest/Lungs:  Chest/Lungs Findings: Clear to auscultation, Normal Respiratory Rate     Heart/Vascular:  Heart Findings: Rate: Normal  Rhythm: Regular Rhythm  Sounds: Normal  Heart murmur: negative       Mental Status:  Mental Status  Findings:  Cooperative, Alert and Oriented         Anesthesia Plan  Type of Anesthesia, risks & benefits discussed:  Anesthesia Type:  MAC  Patient's Preference:   Intra-op Monitoring Plan: standard ASA monitors  Intra-op Monitoring Plan Comments:   Post Op Pain Control Plan: multimodal analgesia, IV/PO Opioids PRN and per primary service following discharge from PACU  Post Op Pain Control Plan Comments:   Induction:   IV  Beta Blocker:  Patient is not currently on a Beta-Blocker (No further documentation required).       Informed Consent: Patient understands risks and agrees with Anesthesia plan.  Questions answered. Anesthesia consent signed with patient.  ASA Score: 2     Day of Surgery Review of History & Physical:    H&P update referred to the provider.         Ready For Surgery From Anesthesia Perspective.

## 2020-12-17 NOTE — ANESTHESIA POSTPROCEDURE EVALUATION
Anesthesia Post Evaluation    Patient: Jessica Nelson MD    Procedure(s) Performed: Procedure(s) (LRB):  COLONOSCOPY (N/A)    Final Anesthesia Type: general      Patient location during evaluation: PACU  Patient participation: Yes- Able to Participate  Level of consciousness: awake and alert  Post-procedure vital signs: reviewed and stable  Pain management: adequate  Airway patency: patent    PONV status at discharge: No PONV  Anesthetic complications: no      Cardiovascular status: blood pressure returned to baseline  Respiratory status: unassisted  Hydration status: euvolemic  Follow-up not needed.          Vitals Value Taken Time   /72 12/17/20 1336   Temp 36.6 °C (97.9 °F) 12/17/20 1306   Pulse 50 12/17/20 1336   Resp 16 12/17/20 1336   SpO2 100 % 12/17/20 1336         Event Time   Out of Recovery 13:36:54         Pain/Aris Score: Aris Score: 10 (12/17/2020  1:36 PM)

## 2020-12-17 NOTE — TRANSFER OF CARE
"Anesthesia Transfer of Care Note    Patient: Jessica Nelson MD    Procedure(s) Performed: Procedure(s) (LRB):  COLONOSCOPY (N/A)    Patient location: GI    Anesthesia Type: general    Transport from OR: Transported from OR on room air with adequate spontaneous ventilation    Post pain: adequate analgesia    Post assessment: tolerated procedure well and no apparent anesthetic complications    Post vital signs: stable    Level of consciousness: sedated    Nausea/Vomiting: no nausea/vomiting    Complications: none    Transfer of care protocol was followed      Last vitals:   Visit Vitals  /72 (BP Location: Left arm, Patient Position: Lying)   Pulse (!) 55   Temp 36.6 °C (97.9 °F) (Temporal)   Resp 14   Ht 5' 10" (1.778 m)   Wt 63.5 kg (140 lb)   SpO2 99%   Breastfeeding No   BMI 20.09 kg/m²     "

## 2020-12-19 RX ORDER — LIDOCAINE HYDROCHLORIDE 10 MG/ML
1 INJECTION INFILTRATION; PERINEURAL
Status: SHIPPED | OUTPATIENT
Start: 2020-12-19

## 2020-12-19 RX ORDER — BACITRACIN 500 [USP'U]/G
OINTMENT TOPICAL 2 TIMES DAILY
Refills: 0 | COMMUNITY
Start: 2020-12-19 | End: 2022-04-07

## 2020-12-19 NOTE — PROCEDURES
Procedures   Vascular Surgery Procedure Note    Date of Operation/Procedure: 12/16/20    Pre-operative Diagnosis:  Symptomatic right lower extremity varicose veins and reticular veins    Post-operative Diagnosis:  Same    Anesthesia:  Local    Operation/Procedure Performed:  Right lower extremity stab phlebectomy (less than 20 sites)    Surgeon: DEX Plaza II, MD    Indications:   50 y.o. female referred for evaluation of lateral thigh varicose veins and reticular veins. Ultrasound of lower extremities after EVLT reveals successful thromboses of bilateral GSV.  she has continued wearing compression but still has discomfort at the sites of her lateral thigh varicose veins.  She would benefit from RLE stab phlebectomy for resolution of her symptomatic lateral thigh varicose veins.     Procedure in Detail:   Prior to entering the procedure room the patient's varicose veins on the right lateral thigh and leg were marked in standing position using a skin marker. The patient was placed in the procedure room in left lateral decubitus position. Right leg was prepped and draped circumferentially from the foot to the high-thigh in normal sterile fashion using ChloraPrep.  Each of the areas marked for phlebectomy was anesthetized with 1% lidocaine injection.  At each site the vein a stab incision was made with an 11 blade and the vein was grasped with a vein hook and carefully retracted out of the incision and grasped with hemostats. Traction was applied on each end of the vein vein to expose more length and the vein was sequentially grasped with hemostats to remove the maximum length of each vein until it eventually avulsed. This technique was performed successfully at 15 stab incisions on the right leg and a total of 20 incisions were made.  A larger 8mm incision was needed to remove a varicose vein from an incision just inferior to the right fibular head This incision was closed with a single 4 Monocryl suture to  reapproximate the skin. All incisions were dressed with sterile antibiotic ointment, cotton balls, and ACE wrap and compression hose was applied over the dressing to both legs afterward. The patient tolerated the procedure well. Pain was well controlled.      The patient was able to get dressed in the post-procedure room went home in stable condition.    Estimated Blood loss: 20ml    Complications: none    DEX Plaza II, MD, RPVI  Vascular Surgeon  [facility]

## 2021-01-04 ENCOUNTER — IMMUNIZATION (OUTPATIENT)
Dept: INTERNAL MEDICINE | Facility: CLINIC | Age: 51
End: 2021-01-04
Payer: COMMERCIAL

## 2021-01-04 DIAGNOSIS — Z23 NEED FOR VACCINATION: ICD-10-CM

## 2021-01-04 PROCEDURE — 91300 COVID-19, MRNA, LNP-S, PF, 30 MCG/0.3 ML DOSE VACCINE: CPT | Mod: PBBFAC | Performed by: INTERNAL MEDICINE

## 2021-01-06 ENCOUNTER — OFFICE VISIT (OUTPATIENT)
Dept: VASCULAR SURGERY | Facility: CLINIC | Age: 51
End: 2021-01-06
Payer: COMMERCIAL

## 2021-01-06 VITALS
DIASTOLIC BLOOD PRESSURE: 80 MMHG | BODY MASS INDEX: 20.04 KG/M2 | SYSTOLIC BLOOD PRESSURE: 114 MMHG | HEIGHT: 70 IN | WEIGHT: 140 LBS | RESPIRATION RATE: 16 BRPM | TEMPERATURE: 98 F | HEART RATE: 68 BPM

## 2021-01-06 DIAGNOSIS — I83.899 SYMPTOMATIC RETICULAR VEINS: Primary | ICD-10-CM

## 2021-01-06 DIAGNOSIS — I87.2 VENOUS INSUFFICIENCY: Primary | ICD-10-CM

## 2021-01-06 PROCEDURE — 3008F BODY MASS INDEX DOCD: CPT | Mod: CPTII,S$GLB,, | Performed by: SURGERY

## 2021-01-06 PROCEDURE — 3008F PR BODY MASS INDEX (BMI) DOCUMENTED: ICD-10-PCS | Mod: CPTII,S$GLB,, | Performed by: SURGERY

## 2021-01-06 PROCEDURE — 1126F PR PAIN SEVERITY QUANTIFIED, NO PAIN PRESENT: ICD-10-PCS | Mod: S$GLB,,, | Performed by: SURGERY

## 2021-01-06 PROCEDURE — 99024 POSTOP FOLLOW-UP VISIT: CPT | Mod: S$GLB,,, | Performed by: SURGERY

## 2021-01-06 PROCEDURE — 3079F DIAST BP 80-89 MM HG: CPT | Mod: CPTII,S$GLB,, | Performed by: SURGERY

## 2021-01-06 PROCEDURE — 3079F PR MOST RECENT DIASTOLIC BLOOD PRESSURE 80-89 MM HG: ICD-10-PCS | Mod: CPTII,S$GLB,, | Performed by: SURGERY

## 2021-01-06 PROCEDURE — 3074F PR MOST RECENT SYSTOLIC BLOOD PRESSURE < 130 MM HG: ICD-10-PCS | Mod: CPTII,S$GLB,, | Performed by: SURGERY

## 2021-01-06 PROCEDURE — 99024 PR POST-OP FOLLOW-UP VISIT: ICD-10-PCS | Mod: S$GLB,,, | Performed by: SURGERY

## 2021-01-06 PROCEDURE — 1126F AMNT PAIN NOTED NONE PRSNT: CPT | Mod: S$GLB,,, | Performed by: SURGERY

## 2021-01-06 PROCEDURE — 3074F SYST BP LT 130 MM HG: CPT | Mod: CPTII,S$GLB,, | Performed by: SURGERY

## 2021-01-15 ENCOUNTER — LAB VISIT (OUTPATIENT)
Dept: INTERNAL MEDICINE | Facility: CLINIC | Age: 51
End: 2021-01-15
Payer: COMMERCIAL

## 2021-01-15 DIAGNOSIS — Z91.89 AT INCREASED RISK OF EXPOSURE TO COVID-19 VIRUS: ICD-10-CM

## 2021-01-15 DIAGNOSIS — Z91.89 AT INCREASED RISK OF EXPOSURE TO COVID-19 VIRUS: Primary | ICD-10-CM

## 2021-01-15 PROCEDURE — U0003 INFECTIOUS AGENT DETECTION BY NUCLEIC ACID (DNA OR RNA); SEVERE ACUTE RESPIRATORY SYNDROME CORONAVIRUS 2 (SARS-COV-2) (CORONAVIRUS DISEASE [COVID-19]), AMPLIFIED PROBE TECHNIQUE, MAKING USE OF HIGH THROUGHPUT TECHNOLOGIES AS DESCRIBED BY CMS-2020-01-R: HCPCS

## 2021-01-16 LAB — SARS-COV-2 RNA RESP QL NAA+PROBE: NOT DETECTED

## 2021-01-22 ENCOUNTER — LAB VISIT (OUTPATIENT)
Dept: INTERNAL MEDICINE | Facility: CLINIC | Age: 51
End: 2021-01-22
Payer: COMMERCIAL

## 2021-01-22 DIAGNOSIS — Z20.822 ENCOUNTER FOR LABORATORY TESTING FOR COVID-19 VIRUS: ICD-10-CM

## 2021-01-22 PROCEDURE — U0003 INFECTIOUS AGENT DETECTION BY NUCLEIC ACID (DNA OR RNA); SEVERE ACUTE RESPIRATORY SYNDROME CORONAVIRUS 2 (SARS-COV-2) (CORONAVIRUS DISEASE [COVID-19]), AMPLIFIED PROBE TECHNIQUE, MAKING USE OF HIGH THROUGHPUT TECHNOLOGIES AS DESCRIBED BY CMS-2020-01-R: HCPCS

## 2021-01-23 LAB — SARS-COV-2 RNA RESP QL NAA+PROBE: NOT DETECTED

## 2021-01-25 ENCOUNTER — IMMUNIZATION (OUTPATIENT)
Dept: INTERNAL MEDICINE | Facility: CLINIC | Age: 51
End: 2021-01-25
Payer: COMMERCIAL

## 2021-01-25 DIAGNOSIS — Z23 NEED FOR VACCINATION: Primary | ICD-10-CM

## 2021-01-25 PROCEDURE — 0002A COVID-19, MRNA, LNP-S, PF, 30 MCG/0.3 ML DOSE VACCINE: CPT | Mod: PBBFAC | Performed by: INTERNAL MEDICINE

## 2021-01-25 PROCEDURE — 91300 COVID-19, MRNA, LNP-S, PF, 30 MCG/0.3 ML DOSE VACCINE: CPT | Mod: PBBFAC | Performed by: INTERNAL MEDICINE

## 2021-02-10 ENCOUNTER — LAB VISIT (OUTPATIENT)
Dept: INTERNAL MEDICINE | Facility: CLINIC | Age: 51
End: 2021-02-10
Payer: COMMERCIAL

## 2021-02-10 DIAGNOSIS — Z91.89 AT INCREASED RISK OF EXPOSURE TO COVID-19 VIRUS: ICD-10-CM

## 2021-02-10 DIAGNOSIS — Z91.89 AT INCREASED RISK OF EXPOSURE TO COVID-19 VIRUS: Primary | ICD-10-CM

## 2021-02-10 PROCEDURE — U0003 INFECTIOUS AGENT DETECTION BY NUCLEIC ACID (DNA OR RNA); SEVERE ACUTE RESPIRATORY SYNDROME CORONAVIRUS 2 (SARS-COV-2) (CORONAVIRUS DISEASE [COVID-19]), AMPLIFIED PROBE TECHNIQUE, MAKING USE OF HIGH THROUGHPUT TECHNOLOGIES AS DESCRIBED BY CMS-2020-01-R: HCPCS

## 2021-02-10 PROCEDURE — U0005 INFEC AGEN DETEC AMPLI PROBE: HCPCS

## 2021-02-11 LAB — SARS-COV-2 RNA RESP QL NAA+PROBE: NOT DETECTED

## 2021-03-02 ENCOUNTER — TELEPHONE (OUTPATIENT)
Dept: VASCULAR SURGERY | Facility: CLINIC | Age: 51
End: 2021-03-02

## 2021-03-03 ENCOUNTER — PROCEDURE VISIT (OUTPATIENT)
Dept: VASCULAR SURGERY | Facility: CLINIC | Age: 51
End: 2021-03-03
Payer: COMMERCIAL

## 2021-03-03 DIAGNOSIS — I87.2 VENOUS INSUFFICIENCY: ICD-10-CM

## 2021-03-03 DIAGNOSIS — I83.90 SPIDER VEIN OF LOWER EXTREMITY: Primary | ICD-10-CM

## 2021-03-03 PROCEDURE — 36471 PR INJECTION THERAPY VEIN,MULT VEINS: ICD-10-PCS | Mod: 79,RT,S$GLB, | Performed by: SURGERY

## 2021-03-03 PROCEDURE — 36471 NJX SCLRSNT MLT INCMPTNT VN: CPT | Mod: 79,RT,S$GLB, | Performed by: SURGERY

## 2021-03-18 ENCOUNTER — PATIENT MESSAGE (OUTPATIENT)
Dept: RESEARCH | Facility: HOSPITAL | Age: 51
End: 2021-03-18

## 2021-03-26 ENCOUNTER — PATIENT MESSAGE (OUTPATIENT)
Dept: RESEARCH | Facility: HOSPITAL | Age: 51
End: 2021-03-26

## 2021-06-02 ENCOUNTER — TELEPHONE (OUTPATIENT)
Dept: INTERNAL MEDICINE | Facility: CLINIC | Age: 51
End: 2021-06-02

## 2021-06-02 DIAGNOSIS — I10 ESSENTIAL HYPERTENSION: ICD-10-CM

## 2021-06-02 RX ORDER — CARVEDILOL 3.12 MG/1
3.12 TABLET ORAL 2 TIMES DAILY WITH MEALS
Qty: 180 TABLET | Refills: 4 | Status: SHIPPED | OUTPATIENT
Start: 2021-06-02 | End: 2021-12-21 | Stop reason: SDUPTHER

## 2021-11-11 DIAGNOSIS — F32.89 OTHER DEPRESSION: ICD-10-CM

## 2021-11-15 ENCOUNTER — CLINICAL SUPPORT (OUTPATIENT)
Dept: INTERNAL MEDICINE | Facility: CLINIC | Age: 51
End: 2021-11-15
Payer: COMMERCIAL

## 2021-11-15 DIAGNOSIS — Z20.822 ENCOUNTER FOR LABORATORY TESTING FOR COVID-19 VIRUS: Primary | ICD-10-CM

## 2021-11-15 DIAGNOSIS — Z20.822 ENCOUNTER FOR LABORATORY TESTING FOR COVID-19 VIRUS: ICD-10-CM

## 2021-11-15 LAB
SARS-COV-2 RNA RESP QL NAA+PROBE: NOT DETECTED
SARS-COV-2- CYCLE NUMBER: NORMAL

## 2021-11-15 PROCEDURE — U0003 INFECTIOUS AGENT DETECTION BY NUCLEIC ACID (DNA OR RNA); SEVERE ACUTE RESPIRATORY SYNDROME CORONAVIRUS 2 (SARS-COV-2) (CORONAVIRUS DISEASE [COVID-19]), AMPLIFIED PROBE TECHNIQUE, MAKING USE OF HIGH THROUGHPUT TECHNOLOGIES AS DESCRIBED BY CMS-2020-01-R: HCPCS | Performed by: PHYSICIAN ASSISTANT

## 2021-11-15 PROCEDURE — U0005 INFEC AGEN DETEC AMPLI PROBE: HCPCS | Performed by: PHYSICIAN ASSISTANT

## 2021-11-15 RX ORDER — ESCITALOPRAM OXALATE 10 MG/1
TABLET ORAL
Qty: 90 TABLET | Refills: 3 | Status: SHIPPED | OUTPATIENT
Start: 2021-11-15 | End: 2022-10-17

## 2021-12-04 ENCOUNTER — IMMUNIZATION (OUTPATIENT)
Dept: INTERNAL MEDICINE | Facility: CLINIC | Age: 51
End: 2021-12-04
Payer: COMMERCIAL

## 2021-12-04 DIAGNOSIS — Z23 NEED FOR VACCINATION: Primary | ICD-10-CM

## 2021-12-04 PROCEDURE — 0004A COVID-19, MRNA, LNP-S, PF, 30 MCG/0.3 ML DOSE VACCINE: CPT | Mod: CV19,PBBFAC | Performed by: INTERNAL MEDICINE

## 2021-12-16 ENCOUNTER — TELEPHONE (OUTPATIENT)
Dept: INTERNAL MEDICINE | Facility: CLINIC | Age: 51
End: 2021-12-16
Payer: COMMERCIAL

## 2021-12-16 DIAGNOSIS — Z12.31 SCREENING MAMMOGRAM, ENCOUNTER FOR: ICD-10-CM

## 2021-12-16 DIAGNOSIS — Z00.00 ENCOUNTER FOR PREVENTIVE HEALTH EXAMINATION: Primary | ICD-10-CM

## 2021-12-20 ENCOUNTER — LAB VISIT (OUTPATIENT)
Dept: LAB | Facility: HOSPITAL | Age: 51
End: 2021-12-20
Attending: PHYSICIAN ASSISTANT
Payer: COMMERCIAL

## 2021-12-20 DIAGNOSIS — Z00.00 ENCOUNTER FOR PREVENTIVE HEALTH EXAMINATION: ICD-10-CM

## 2021-12-20 LAB
25(OH)D3+25(OH)D2 SERPL-MCNC: 37 NG/ML (ref 30–96)
ALBUMIN SERPL BCP-MCNC: 4.1 G/DL (ref 3.5–5.2)
ALP SERPL-CCNC: 54 U/L (ref 55–135)
ALT SERPL W/O P-5'-P-CCNC: 26 U/L (ref 10–44)
ANION GAP SERPL CALC-SCNC: 5 MMOL/L (ref 8–16)
AST SERPL-CCNC: 25 U/L (ref 10–40)
BASOPHILS # BLD AUTO: 0.02 K/UL (ref 0–0.2)
BASOPHILS NFR BLD: 0.5 % (ref 0–1.9)
BILIRUB SERPL-MCNC: 1 MG/DL (ref 0.1–1)
BUN SERPL-MCNC: 16 MG/DL (ref 6–20)
CALCIUM SERPL-MCNC: 9.6 MG/DL (ref 8.7–10.5)
CHLORIDE SERPL-SCNC: 102 MMOL/L (ref 95–110)
CHOLEST SERPL-MCNC: 236 MG/DL (ref 120–199)
CHOLEST/HDLC SERPL: 2.8 {RATIO} (ref 2–5)
CO2 SERPL-SCNC: 30 MMOL/L (ref 23–29)
CREAT SERPL-MCNC: 0.9 MG/DL (ref 0.5–1.4)
DIFFERENTIAL METHOD: ABNORMAL
EOSINOPHIL # BLD AUTO: 0 K/UL (ref 0–0.5)
EOSINOPHIL NFR BLD: 0.8 % (ref 0–8)
ERYTHROCYTE [DISTWIDTH] IN BLOOD BY AUTOMATED COUNT: 12 % (ref 11.5–14.5)
EST. GFR  (AFRICAN AMERICAN): >60 ML/MIN/1.73 M^2
EST. GFR  (NON AFRICAN AMERICAN): >60 ML/MIN/1.73 M^2
FERRITIN SERPL-MCNC: 143 NG/ML (ref 20–300)
GLUCOSE SERPL-MCNC: 73 MG/DL (ref 70–110)
HCT VFR BLD AUTO: 36.6 % (ref 37–48.5)
HDLC SERPL-MCNC: 85 MG/DL (ref 40–75)
HDLC SERPL: 36 % (ref 20–50)
HGB BLD-MCNC: 12.2 G/DL (ref 12–16)
IMM GRANULOCYTES # BLD AUTO: 0.01 K/UL (ref 0–0.04)
IMM GRANULOCYTES NFR BLD AUTO: 0.3 % (ref 0–0.5)
IRON SERPL-MCNC: 135 UG/DL (ref 30–160)
LDLC SERPL CALC-MCNC: 139.6 MG/DL (ref 63–159)
LYMPHOCYTES # BLD AUTO: 1.6 K/UL (ref 1–4.8)
LYMPHOCYTES NFR BLD: 43.6 % (ref 18–48)
MCH RBC QN AUTO: 29.7 PG (ref 27–31)
MCHC RBC AUTO-ENTMCNC: 33.3 G/DL (ref 32–36)
MCV RBC AUTO: 89 FL (ref 82–98)
MONOCYTES # BLD AUTO: 0.5 K/UL (ref 0.3–1)
MONOCYTES NFR BLD: 12.3 % (ref 4–15)
NEUTROPHILS # BLD AUTO: 1.6 K/UL (ref 1.8–7.7)
NEUTROPHILS NFR BLD: 42.5 % (ref 38–73)
NONHDLC SERPL-MCNC: 151 MG/DL
NRBC BLD-RTO: 0 /100 WBC
PLATELET # BLD AUTO: 240 K/UL (ref 150–450)
PMV BLD AUTO: 10.4 FL (ref 9.2–12.9)
POTASSIUM SERPL-SCNC: 3.7 MMOL/L (ref 3.5–5.1)
PROT SERPL-MCNC: 6.9 G/DL (ref 6–8.4)
RBC # BLD AUTO: 4.11 M/UL (ref 4–5.4)
SATURATED IRON: 38 % (ref 20–50)
SODIUM SERPL-SCNC: 137 MMOL/L (ref 136–145)
T4 FREE SERPL-MCNC: 0.88 NG/DL (ref 0.71–1.51)
TOTAL IRON BINDING CAPACITY: 358 UG/DL (ref 250–450)
TRANSFERRIN SERPL-MCNC: 242 MG/DL (ref 200–375)
TRIGL SERPL-MCNC: 57 MG/DL (ref 30–150)
TSH SERPL DL<=0.005 MIU/L-ACNC: 6.29 UIU/ML (ref 0.4–4)
WBC # BLD AUTO: 3.65 K/UL (ref 3.9–12.7)

## 2021-12-20 PROCEDURE — 84443 ASSAY THYROID STIM HORMONE: CPT | Performed by: PHYSICIAN ASSISTANT

## 2021-12-20 PROCEDURE — 36415 COLL VENOUS BLD VENIPUNCTURE: CPT | Performed by: PHYSICIAN ASSISTANT

## 2021-12-20 PROCEDURE — 84466 ASSAY OF TRANSFERRIN: CPT | Performed by: PHYSICIAN ASSISTANT

## 2021-12-20 PROCEDURE — 85025 COMPLETE CBC W/AUTO DIFF WBC: CPT | Performed by: PHYSICIAN ASSISTANT

## 2021-12-20 PROCEDURE — 82728 ASSAY OF FERRITIN: CPT | Performed by: PHYSICIAN ASSISTANT

## 2021-12-20 PROCEDURE — 80053 COMPREHEN METABOLIC PANEL: CPT | Performed by: PHYSICIAN ASSISTANT

## 2021-12-20 PROCEDURE — 84439 ASSAY OF FREE THYROXINE: CPT | Performed by: PHYSICIAN ASSISTANT

## 2021-12-20 PROCEDURE — 82306 VITAMIN D 25 HYDROXY: CPT | Performed by: PHYSICIAN ASSISTANT

## 2021-12-20 PROCEDURE — 80061 LIPID PANEL: CPT | Performed by: PHYSICIAN ASSISTANT

## 2021-12-21 ENCOUNTER — OFFICE VISIT (OUTPATIENT)
Dept: INTERNAL MEDICINE | Facility: CLINIC | Age: 51
End: 2021-12-21
Payer: COMMERCIAL

## 2021-12-21 VITALS
OXYGEN SATURATION: 99 % | SYSTOLIC BLOOD PRESSURE: 122 MMHG | DIASTOLIC BLOOD PRESSURE: 60 MMHG | HEART RATE: 60 BPM | BODY MASS INDEX: 20.54 KG/M2 | WEIGHT: 143.5 LBS | HEIGHT: 70 IN

## 2021-12-21 DIAGNOSIS — I10 ESSENTIAL HYPERTENSION: ICD-10-CM

## 2021-12-21 DIAGNOSIS — E03.9 HYPOTHYROIDISM, UNSPECIFIED TYPE: ICD-10-CM

## 2021-12-21 DIAGNOSIS — Z00.00 ENCOUNTER FOR PREVENTIVE HEALTH EXAMINATION: Primary | ICD-10-CM

## 2021-12-21 DIAGNOSIS — Z80.3 FAMILY HISTORY OF BREAST CANCER: ICD-10-CM

## 2021-12-21 DIAGNOSIS — Z12.31 SCREENING MAMMOGRAM, ENCOUNTER FOR: ICD-10-CM

## 2021-12-21 PROCEDURE — 99396 PR PREVENTIVE VISIT,EST,40-64: ICD-10-PCS | Mod: S$GLB,,, | Performed by: PHYSICIAN ASSISTANT

## 2021-12-21 PROCEDURE — 99999 PR PBB SHADOW E&M-EST. PATIENT-LVL III: ICD-10-PCS | Mod: PBBFAC,,, | Performed by: PHYSICIAN ASSISTANT

## 2021-12-21 PROCEDURE — 99396 PREV VISIT EST AGE 40-64: CPT | Mod: S$GLB,,, | Performed by: PHYSICIAN ASSISTANT

## 2021-12-21 PROCEDURE — 99999 PR PBB SHADOW E&M-EST. PATIENT-LVL III: CPT | Mod: PBBFAC,,, | Performed by: PHYSICIAN ASSISTANT

## 2021-12-21 RX ORDER — LEVOTHYROXINE SODIUM 25 UG/1
25 TABLET ORAL
Qty: 30 TABLET | Refills: 11 | Status: SHIPPED | OUTPATIENT
Start: 2021-12-21 | End: 2022-12-20

## 2021-12-21 RX ORDER — CARVEDILOL 3.12 MG/1
3.12 TABLET ORAL 2 TIMES DAILY WITH MEALS
Qty: 180 TABLET | Refills: 4 | Status: SHIPPED | OUTPATIENT
Start: 2021-12-21 | End: 2022-12-20 | Stop reason: SDUPTHER

## 2022-01-13 ENCOUNTER — TELEPHONE (OUTPATIENT)
Dept: INTERNAL MEDICINE | Facility: CLINIC | Age: 52
End: 2022-01-13
Payer: COMMERCIAL

## 2022-01-13 ENCOUNTER — LAB VISIT (OUTPATIENT)
Dept: PRIMARY CARE CLINIC | Facility: CLINIC | Age: 52
End: 2022-01-13
Payer: COMMERCIAL

## 2022-01-13 DIAGNOSIS — Z20.822 CONTACT WITH AND (SUSPECTED) EXPOSURE TO COVID-19: ICD-10-CM

## 2022-01-13 DIAGNOSIS — R05.9 COUGH: Primary | ICD-10-CM

## 2022-01-13 LAB
CTP QC/QA: YES
SARS-COV-2 AG RESP QL IA.RAPID: NEGATIVE

## 2022-01-13 PROCEDURE — 87811 SARS-COV-2 COVID19 W/OPTIC: CPT

## 2022-01-14 ENCOUNTER — CLINICAL SUPPORT (OUTPATIENT)
Dept: INTERNAL MEDICINE | Facility: CLINIC | Age: 52
End: 2022-01-14
Payer: COMMERCIAL

## 2022-01-14 DIAGNOSIS — R51.9 HEAD ACHE: ICD-10-CM

## 2022-01-14 LAB
SARS-COV-2 RNA RESP QL NAA+PROBE: NOT DETECTED
SARS-COV-2- CYCLE NUMBER: NORMAL

## 2022-01-14 PROCEDURE — U0005 INFEC AGEN DETEC AMPLI PROBE: HCPCS | Performed by: PHYSICIAN ASSISTANT

## 2022-01-14 PROCEDURE — U0003 INFECTIOUS AGENT DETECTION BY NUCLEIC ACID (DNA OR RNA); SEVERE ACUTE RESPIRATORY SYNDROME CORONAVIRUS 2 (SARS-COV-2) (CORONAVIRUS DISEASE [COVID-19]), AMPLIFIED PROBE TECHNIQUE, MAKING USE OF HIGH THROUGHPUT TECHNOLOGIES AS DESCRIBED BY CMS-2020-01-R: HCPCS | Performed by: PHYSICIAN ASSISTANT

## 2022-01-15 ENCOUNTER — PATIENT MESSAGE (OUTPATIENT)
Dept: INTERNAL MEDICINE | Facility: CLINIC | Age: 52
End: 2022-01-15
Payer: COMMERCIAL

## 2022-01-15 DIAGNOSIS — Z01.419 GYNECOLOGIC EXAM NORMAL: Primary | ICD-10-CM

## 2022-01-21 ENCOUNTER — PATIENT MESSAGE (OUTPATIENT)
Dept: RESEARCH | Facility: HOSPITAL | Age: 52
End: 2022-01-21
Payer: COMMERCIAL

## 2022-01-27 ENCOUNTER — HOSPITAL ENCOUNTER (OUTPATIENT)
Dept: RADIOLOGY | Facility: HOSPITAL | Age: 52
Discharge: HOME | End: 2022-01-27
Attending: PHYSICIAN ASSISTANT
Payer: COMMERCIAL

## 2022-01-27 VITALS — HEIGHT: 70 IN | BODY MASS INDEX: 20.47 KG/M2 | WEIGHT: 143 LBS

## 2022-01-27 DIAGNOSIS — Z12.31 SCREENING MAMMOGRAM, ENCOUNTER FOR: ICD-10-CM

## 2022-01-27 PROCEDURE — 77063 BREAST TOMOSYNTHESIS BI: CPT | Mod: 26,,, | Performed by: RADIOLOGY

## 2022-01-27 PROCEDURE — 77063 MAMMO DIGITAL SCREENING BILAT WITH TOMO: ICD-10-PCS | Mod: 26,,, | Performed by: RADIOLOGY

## 2022-01-27 PROCEDURE — 77063 BREAST TOMOSYNTHESIS BI: CPT | Mod: TC

## 2022-01-27 PROCEDURE — 77067 MAMMO DIGITAL SCREENING BILAT WITH TOMO: ICD-10-PCS | Mod: 26,,, | Performed by: RADIOLOGY

## 2022-01-27 PROCEDURE — 77067 SCR MAMMO BI INCL CAD: CPT | Mod: 26,,, | Performed by: RADIOLOGY

## 2022-02-09 ENCOUNTER — LAB VISIT (OUTPATIENT)
Dept: LAB | Facility: HOSPITAL | Age: 52
End: 2022-02-09
Payer: COMMERCIAL

## 2022-02-09 ENCOUNTER — TELEPHONE (OUTPATIENT)
Dept: INTERNAL MEDICINE | Facility: CLINIC | Age: 52
End: 2022-02-09
Payer: COMMERCIAL

## 2022-02-09 DIAGNOSIS — R30.0 DYSURIA: Primary | ICD-10-CM

## 2022-02-09 DIAGNOSIS — R30.0 DYSURIA: ICD-10-CM

## 2022-02-09 PROCEDURE — 87086 URINE CULTURE/COLONY COUNT: CPT | Performed by: PHYSICIAN ASSISTANT

## 2022-02-09 PROCEDURE — 81001 URINALYSIS AUTO W/SCOPE: CPT | Performed by: PHYSICIAN ASSISTANT

## 2022-02-10 LAB
BACTERIA #/AREA URNS AUTO: ABNORMAL /HPF
BACTERIA UR CULT: NORMAL
BACTERIA UR CULT: NORMAL
BILIRUB UR QL STRIP: NEGATIVE
CLARITY UR REFRACT.AUTO: ABNORMAL
COLOR UR AUTO: ABNORMAL
GLUCOSE UR QL STRIP: NEGATIVE
HGB UR QL STRIP: ABNORMAL
HYALINE CASTS UR QL AUTO: 2 /LPF
KETONES UR QL STRIP: NEGATIVE
LEUKOCYTE ESTERASE UR QL STRIP: ABNORMAL
MICROSCOPIC COMMENT: ABNORMAL
NITRITE UR QL STRIP: POSITIVE
PH UR STRIP: 6 [PH] (ref 5–8)
PROT UR QL STRIP: ABNORMAL
RBC #/AREA URNS AUTO: >100 /HPF (ref 0–4)
SP GR UR STRIP: 1.03 (ref 1–1.03)
URN SPEC COLLECT METH UR: ABNORMAL
WBC #/AREA URNS AUTO: 49 /HPF (ref 0–5)

## 2022-02-24 ENCOUNTER — CLINICAL SUPPORT (OUTPATIENT)
Dept: INTERNAL MEDICINE | Facility: CLINIC | Age: 52
End: 2022-02-24
Payer: COMMERCIAL

## 2022-02-24 DIAGNOSIS — Z91.89 AT INCREASED RISK OF EXPOSURE TO COVID-19 VIRUS: Primary | ICD-10-CM

## 2022-02-24 LAB
CTP QC/QA: YES
SARS-COV-2 RDRP RESP QL NAA+PROBE: NEGATIVE

## 2022-02-24 PROCEDURE — U0002: ICD-10-PCS | Mod: QW,S$GLB,, | Performed by: PHYSICIAN ASSISTANT

## 2022-02-24 PROCEDURE — U0002 COVID-19 LAB TEST NON-CDC: HCPCS | Mod: QW,S$GLB,, | Performed by: PHYSICIAN ASSISTANT

## 2022-04-07 ENCOUNTER — OFFICE VISIT (OUTPATIENT)
Dept: OBSTETRICS AND GYNECOLOGY | Facility: CLINIC | Age: 52
End: 2022-04-07
Payer: COMMERCIAL

## 2022-04-07 VITALS
HEIGHT: 70 IN | DIASTOLIC BLOOD PRESSURE: 82 MMHG | SYSTOLIC BLOOD PRESSURE: 122 MMHG | WEIGHT: 141.56 LBS | BODY MASS INDEX: 20.27 KG/M2

## 2022-04-07 DIAGNOSIS — Z01.419 GYNECOLOGIC EXAM NORMAL: Primary | ICD-10-CM

## 2022-04-07 DIAGNOSIS — N95.2 VAGINAL ATROPHY: ICD-10-CM

## 2022-04-07 DIAGNOSIS — N94.10 DYSPAREUNIA IN FEMALE: ICD-10-CM

## 2022-04-07 PROCEDURE — 1159F PR MEDICATION LIST DOCUMENTED IN MEDICAL RECORD: ICD-10-PCS | Mod: CPTII,S$GLB,, | Performed by: OBSTETRICS & GYNECOLOGY

## 2022-04-07 PROCEDURE — 3008F PR BODY MASS INDEX (BMI) DOCUMENTED: ICD-10-PCS | Mod: CPTII,S$GLB,, | Performed by: OBSTETRICS & GYNECOLOGY

## 2022-04-07 PROCEDURE — 3074F SYST BP LT 130 MM HG: CPT | Mod: CPTII,S$GLB,, | Performed by: OBSTETRICS & GYNECOLOGY

## 2022-04-07 PROCEDURE — 3008F BODY MASS INDEX DOCD: CPT | Mod: CPTII,S$GLB,, | Performed by: OBSTETRICS & GYNECOLOGY

## 2022-04-07 PROCEDURE — 1160F PR REVIEW ALL MEDS BY PRESCRIBER/CLIN PHARMACIST DOCUMENTED: ICD-10-PCS | Mod: CPTII,S$GLB,, | Performed by: OBSTETRICS & GYNECOLOGY

## 2022-04-07 PROCEDURE — 3079F DIAST BP 80-89 MM HG: CPT | Mod: CPTII,S$GLB,, | Performed by: OBSTETRICS & GYNECOLOGY

## 2022-04-07 PROCEDURE — 99386 PR PREVENTIVE VISIT,NEW,40-64: ICD-10-PCS | Mod: S$GLB,,, | Performed by: OBSTETRICS & GYNECOLOGY

## 2022-04-07 PROCEDURE — 88175 CYTOPATH C/V AUTO FLUID REDO: CPT | Performed by: OBSTETRICS & GYNECOLOGY

## 2022-04-07 PROCEDURE — 99999 PR PBB SHADOW E&M-EST. PATIENT-LVL III: CPT | Mod: PBBFAC,,, | Performed by: OBSTETRICS & GYNECOLOGY

## 2022-04-07 PROCEDURE — 3079F PR MOST RECENT DIASTOLIC BLOOD PRESSURE 80-89 MM HG: ICD-10-PCS | Mod: CPTII,S$GLB,, | Performed by: OBSTETRICS & GYNECOLOGY

## 2022-04-07 PROCEDURE — 3074F PR MOST RECENT SYSTOLIC BLOOD PRESSURE < 130 MM HG: ICD-10-PCS | Mod: CPTII,S$GLB,, | Performed by: OBSTETRICS & GYNECOLOGY

## 2022-04-07 PROCEDURE — 1159F MED LIST DOCD IN RCRD: CPT | Mod: CPTII,S$GLB,, | Performed by: OBSTETRICS & GYNECOLOGY

## 2022-04-07 PROCEDURE — 99386 PREV VISIT NEW AGE 40-64: CPT | Mod: S$GLB,,, | Performed by: OBSTETRICS & GYNECOLOGY

## 2022-04-07 PROCEDURE — 99999 PR PBB SHADOW E&M-EST. PATIENT-LVL III: ICD-10-PCS | Mod: PBBFAC,,, | Performed by: OBSTETRICS & GYNECOLOGY

## 2022-04-07 PROCEDURE — 1160F RVW MEDS BY RX/DR IN RCRD: CPT | Mod: CPTII,S$GLB,, | Performed by: OBSTETRICS & GYNECOLOGY

## 2022-04-07 RX ORDER — PRASTERONE 6.5 MG/1
6.5 INSERT VAGINAL DAILY
Qty: 28 EACH | Refills: 12 | Status: SHIPPED | OUTPATIENT
Start: 2022-04-07 | End: 2022-05-17

## 2022-04-07 NOTE — PROGRESS NOTES
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  She has vaginal burning and dyspareunia. Vaginal irritation.  She has some Urinary incontinence but it has improved with kegel exercises      Past Medical History:   Diagnosis Date    Alopecia areata 1990    Resolved. Possibly secondary to CMV    Benign essential HTN 2015    Bilateral retinal lattice degeneration     Nodular fasciitis 2005    LUE    Subclinical hypothyroidism        Past Surgical History:   Procedure Laterality Date    COLONOSCOPY N/A 2020    Procedure: COLONOSCOPY;  Surgeon: Ida Donaldson MD;  Location: [facility] ([address] [place] [address]);  Service: Endoscopy;  Laterality: N/A;  Per JOVANY Sorto, Pt having constipation and LLQ abdominal pain, diagnostic colonoscopy- [id number]@[id number]  [facility] on [facility]    Maxillofacial Surgery  1986    Malocclusion    Nodule Excision  2005    Benign Nodular Fasciitis LUE    RETINAL LASER PROCEDURE  2007    Lattice Degeneration Retina    TONSILLECTOMY         OB History    Para Term  AB Living   2 2 2     4   SAB IAB Ectopic Multiple Live Births           2      # Outcome Date GA Lbr Allen/2nd Weight Sex Delivery Anes PTL Lv   2 Term 06    F Vag-Spont  N DUNIA   1 Term 03    M Vag-Spont  N DUNIA       Family History   Problem Relation Age of Onset    Hypertension Mother     Hyperlipidemia Mother     Breast cancer Mother 67    Hypertension Father     Hyperlipidemia Father     Ovarian cancer Paternal Aunt     Thyroid disease Neg Hx     Amblyopia Neg Hx     Blindness Neg Hx     Cancer Neg Hx     Cataracts Neg Hx     Diabetes Neg Hx     Glaucoma Neg Hx     Macular degeneration Neg Hx     Retinal detachment Neg Hx     Strabismus Neg Hx     Stroke Neg Hx        Social History     Tobacco Use    Smoking status: Never Smoker    Smokeless tobacco: Never Used   Substance Use Topics     "Alcohol use: Yes     Comment: Rare    Drug use: No       /82   Ht 5' 10" (1.778 m)   Wt 64.2 kg (141 lb 8.6 oz)   LMP 11/04/2019   BMI 20.31 kg/m²     ROS:  GENERAL: Denies weight gain or weight loss. Feeling well overall.   SKIN: Denies rash or lesions.   HEAD: Denies head injury or headache.   NODES: Denies enlarged lymph nodes.   CHEST: Denies chest pain or shortness of breath.   CARDIOVASCULAR: Denies palpitations or left sided chest pain.   ABDOMEN: No abdominal pain, constipation, diarrhea, nausea, vomiting or rectal bleeding.   URINARY: No frequency, dysuria, hematuria, or burning on urination.  REPRODUCTIVE: See HPI.   BREASTS: The patient performs breast self-examination and denies pain, lumps, or nipple discharge.   HEMATOLOGIC: No easy bruisability or excessive bleeding.  MUSCULOSKELETAL: Denies joint pain or swelling.   NEUROLOGIC: Denies syncope or weakness.   PSYCHIATRIC: Denies depression, anxiety or mood swings.    Physical Exam:    APPEARANCE: Well nourished, well developed, in no acute distress.  AFFECT: WNL, alert and oriented x 3  SKIN: No acne or hirsutism  NECK: Neck symmetric without masses or thyromegaly  NODES: No inguinal, cervical, axillary, or femoral lymph node enlargement  CHEST: Good respiratory effect  ABDOMEN: Soft.  No tenderness or masses.  No hepatosplenomegaly.  No hernias.  BREASTS: Symmetrical, no skin changes or visible lesions.  No palpable masses, nipple discharge bilaterally.  PELVIC: Normal external genitalia without lesions.  Normal hair distribution.  Adequate perineal body, normal urethral meatus.  Vagina moist and well rugated without lesions or discharge.  Cervix pink, without lesions, discharge or tenderness.  No significant cystocele or rectocele.  Bimanual exam shows uterus to be normal size, regular, mobile and nontender.  Adnexa without masses or tenderness.    EXTREMITIES: No edema.    ASSESSMENT AND PLAN  1. Gynecologic exam normal  Ambulatory " referral/consult to Gynecology    Liquid-Based Pap Smear, Screening   2. Vaginal atrophy  prasterone, dhea, (INTRAROSA) 6.5 mg Inst   3. Dyspareunia in female       Lubrication/ olive oil/ coconut oil  Consider vaginal estrogen cream vs vagifem if intrarosa is not sucessful  MMG up to date  Colonoscopy UP to date    Patient was counseled today on A.C.S. Pap guidelines and recommendations for yearly pelvic exams, mammograms and monthly self breast exams; to see her PCP for other health maintenance.     No follow-ups on file.

## 2022-04-08 ENCOUNTER — TELEPHONE (OUTPATIENT)
Dept: PHARMACY | Facility: CLINIC | Age: 52
End: 2022-04-08
Payer: COMMERCIAL

## 2022-05-02 ENCOUNTER — CLINICAL SUPPORT (OUTPATIENT)
Dept: INTERNAL MEDICINE | Facility: CLINIC | Age: 52
End: 2022-05-02
Payer: COMMERCIAL

## 2022-05-02 ENCOUNTER — TELEPHONE (OUTPATIENT)
Dept: INTERNAL MEDICINE | Facility: CLINIC | Age: 52
End: 2022-05-02
Payer: COMMERCIAL

## 2022-05-02 DIAGNOSIS — Z91.89 AT INCREASED RISK OF EXPOSURE TO COVID-19 VIRUS: Primary | ICD-10-CM

## 2022-05-02 LAB
CTP QC/QA: YES
SARS-COV-2 RDRP RESP QL NAA+PROBE: NEGATIVE

## 2022-05-02 PROCEDURE — U0002: ICD-10-PCS | Mod: QW,S$GLB,, | Performed by: INTERNAL MEDICINE

## 2022-05-02 PROCEDURE — U0002 COVID-19 LAB TEST NON-CDC: HCPCS | Mod: QW,S$GLB,, | Performed by: INTERNAL MEDICINE

## 2022-11-09 ENCOUNTER — TELEPHONE (OUTPATIENT)
Dept: INTERNAL MEDICINE | Facility: CLINIC | Age: 52
End: 2022-11-09
Payer: COMMERCIAL

## 2022-11-09 DIAGNOSIS — Z12.31 SCREENING MAMMOGRAM, ENCOUNTER FOR: ICD-10-CM

## 2022-11-09 DIAGNOSIS — Z00.00 ENCOUNTER FOR PREVENTIVE HEALTH EXAMINATION: Primary | ICD-10-CM

## 2022-12-15 ENCOUNTER — LAB VISIT (OUTPATIENT)
Dept: LAB | Facility: HOSPITAL | Age: 52
End: 2022-12-15
Payer: COMMERCIAL

## 2022-12-15 DIAGNOSIS — Z00.00 ENCOUNTER FOR PREVENTIVE HEALTH EXAMINATION: ICD-10-CM

## 2022-12-15 LAB
25(OH)D3+25(OH)D2 SERPL-MCNC: 34 NG/ML (ref 30–96)
ALBUMIN SERPL BCP-MCNC: 4.4 G/DL (ref 3.5–5.2)
ALP SERPL-CCNC: 52 U/L (ref 55–135)
ALT SERPL W/O P-5'-P-CCNC: 20 U/L (ref 10–44)
ANION GAP SERPL CALC-SCNC: 8 MMOL/L (ref 8–16)
AST SERPL-CCNC: 22 U/L (ref 10–40)
BASOPHILS # BLD AUTO: 0.02 K/UL (ref 0–0.2)
BASOPHILS NFR BLD: 0.7 % (ref 0–1.9)
BILIRUB SERPL-MCNC: 0.9 MG/DL (ref 0.1–1)
BUN SERPL-MCNC: 18 MG/DL (ref 6–20)
CALCIUM SERPL-MCNC: 9.9 MG/DL (ref 8.7–10.5)
CHLORIDE SERPL-SCNC: 103 MMOL/L (ref 95–110)
CHOLEST SERPL-MCNC: 275 MG/DL (ref 120–199)
CHOLEST/HDLC SERPL: 2.8 {RATIO} (ref 2–5)
CO2 SERPL-SCNC: 27 MMOL/L (ref 23–29)
CREAT SERPL-MCNC: 0.8 MG/DL (ref 0.5–1.4)
DIFFERENTIAL METHOD: ABNORMAL
EOSINOPHIL # BLD AUTO: 0.1 K/UL (ref 0–0.5)
EOSINOPHIL NFR BLD: 1.6 % (ref 0–8)
ERYTHROCYTE [DISTWIDTH] IN BLOOD BY AUTOMATED COUNT: 11.9 % (ref 11.5–14.5)
EST. GFR  (NO RACE VARIABLE): >60 ML/MIN/1.73 M^2
FERRITIN SERPL-MCNC: 107 NG/ML (ref 20–300)
GLUCOSE SERPL-MCNC: 86 MG/DL (ref 70–110)
HCT VFR BLD AUTO: 38.7 % (ref 37–48.5)
HDLC SERPL-MCNC: 98 MG/DL (ref 40–75)
HDLC SERPL: 35.6 % (ref 20–50)
HGB BLD-MCNC: 12.8 G/DL (ref 12–16)
IMM GRANULOCYTES # BLD AUTO: 0 K/UL (ref 0–0.04)
IMM GRANULOCYTES NFR BLD AUTO: 0 % (ref 0–0.5)
IRON SERPL-MCNC: 125 UG/DL (ref 30–160)
LDLC SERPL CALC-MCNC: 162.8 MG/DL (ref 63–159)
LYMPHOCYTES # BLD AUTO: 1.4 K/UL (ref 1–4.8)
LYMPHOCYTES NFR BLD: 45.8 % (ref 18–48)
MCH RBC QN AUTO: 30 PG (ref 27–31)
MCHC RBC AUTO-ENTMCNC: 33.1 G/DL (ref 32–36)
MCV RBC AUTO: 91 FL (ref 82–98)
MONOCYTES # BLD AUTO: 0.3 K/UL (ref 0.3–1)
MONOCYTES NFR BLD: 9.5 % (ref 4–15)
NEUTROPHILS # BLD AUTO: 1.3 K/UL (ref 1.8–7.7)
NEUTROPHILS NFR BLD: 42.4 % (ref 38–73)
NONHDLC SERPL-MCNC: 177 MG/DL
NRBC BLD-RTO: 0 /100 WBC
PLATELET # BLD AUTO: 212 K/UL (ref 150–450)
PMV BLD AUTO: 10.5 FL (ref 9.2–12.9)
POTASSIUM SERPL-SCNC: 4.4 MMOL/L (ref 3.5–5.1)
PROT SERPL-MCNC: 7.4 G/DL (ref 6–8.4)
RBC # BLD AUTO: 4.27 M/UL (ref 4–5.4)
SATURATED IRON: 34 % (ref 20–50)
SODIUM SERPL-SCNC: 138 MMOL/L (ref 136–145)
T4 FREE SERPL-MCNC: 0.92 NG/DL (ref 0.71–1.51)
TOTAL IRON BINDING CAPACITY: 363 UG/DL (ref 250–450)
TRANSFERRIN SERPL-MCNC: 245 MG/DL (ref 200–375)
TRIGL SERPL-MCNC: 71 MG/DL (ref 30–150)
TSH SERPL DL<=0.005 MIU/L-ACNC: 4.07 UIU/ML (ref 0.4–4)
WBC # BLD AUTO: 3.06 K/UL (ref 3.9–12.7)

## 2022-12-15 PROCEDURE — 82728 ASSAY OF FERRITIN: CPT | Performed by: PHYSICIAN ASSISTANT

## 2022-12-15 PROCEDURE — 82306 VITAMIN D 25 HYDROXY: CPT | Performed by: PHYSICIAN ASSISTANT

## 2022-12-15 PROCEDURE — 84443 ASSAY THYROID STIM HORMONE: CPT | Performed by: PHYSICIAN ASSISTANT

## 2022-12-15 PROCEDURE — 36415 COLL VENOUS BLD VENIPUNCTURE: CPT | Performed by: PHYSICIAN ASSISTANT

## 2022-12-15 PROCEDURE — 84466 ASSAY OF TRANSFERRIN: CPT | Performed by: PHYSICIAN ASSISTANT

## 2022-12-15 PROCEDURE — 80053 COMPREHEN METABOLIC PANEL: CPT | Performed by: PHYSICIAN ASSISTANT

## 2022-12-15 PROCEDURE — 80061 LIPID PANEL: CPT | Performed by: PHYSICIAN ASSISTANT

## 2022-12-15 PROCEDURE — 84439 ASSAY OF FREE THYROXINE: CPT | Performed by: PHYSICIAN ASSISTANT

## 2022-12-15 PROCEDURE — 85025 COMPLETE CBC W/AUTO DIFF WBC: CPT | Performed by: PHYSICIAN ASSISTANT

## 2022-12-20 ENCOUNTER — OFFICE VISIT (OUTPATIENT)
Dept: INTERNAL MEDICINE | Facility: CLINIC | Age: 52
End: 2022-12-20
Payer: COMMERCIAL

## 2022-12-20 VITALS
SYSTOLIC BLOOD PRESSURE: 110 MMHG | HEART RATE: 60 BPM | HEIGHT: 70 IN | OXYGEN SATURATION: 99 % | BODY MASS INDEX: 20.67 KG/M2 | WEIGHT: 144.38 LBS | DIASTOLIC BLOOD PRESSURE: 68 MMHG

## 2022-12-20 DIAGNOSIS — I10 ESSENTIAL HYPERTENSION: ICD-10-CM

## 2022-12-20 DIAGNOSIS — Z00.00 ENCOUNTER FOR PREVENTIVE HEALTH EXAMINATION: Primary | ICD-10-CM

## 2022-12-20 DIAGNOSIS — Z13.6 ENCOUNTER FOR SCREENING FOR CARDIOVASCULAR DISORDERS: ICD-10-CM

## 2022-12-20 DIAGNOSIS — Z13.220 SCREENING FOR LIPID DISORDERS: ICD-10-CM

## 2022-12-20 DIAGNOSIS — I87.2 VENOUS INSUFFICIENCY OF BOTH LOWER EXTREMITIES: ICD-10-CM

## 2022-12-20 DIAGNOSIS — E03.9 HYPOTHYROIDISM, UNSPECIFIED TYPE: ICD-10-CM

## 2022-12-20 DIAGNOSIS — H35.413 BILATERAL RETINAL LATTICE DEGENERATION: ICD-10-CM

## 2022-12-20 DIAGNOSIS — Z80.3 FAMILY HISTORY OF BREAST CANCER: ICD-10-CM

## 2022-12-20 PROCEDURE — 3078F DIAST BP <80 MM HG: CPT | Mod: CPTII,S$GLB,, | Performed by: PHYSICIAN ASSISTANT

## 2022-12-20 PROCEDURE — 99396 PREV VISIT EST AGE 40-64: CPT | Mod: S$GLB,,, | Performed by: PHYSICIAN ASSISTANT

## 2022-12-20 PROCEDURE — 1160F RVW MEDS BY RX/DR IN RCRD: CPT | Mod: CPTII,S$GLB,, | Performed by: PHYSICIAN ASSISTANT

## 2022-12-20 PROCEDURE — 3074F SYST BP LT 130 MM HG: CPT | Mod: CPTII,S$GLB,, | Performed by: PHYSICIAN ASSISTANT

## 2022-12-20 PROCEDURE — 3074F PR MOST RECENT SYSTOLIC BLOOD PRESSURE < 130 MM HG: ICD-10-PCS | Mod: CPTII,S$GLB,, | Performed by: PHYSICIAN ASSISTANT

## 2022-12-20 PROCEDURE — 3078F PR MOST RECENT DIASTOLIC BLOOD PRESSURE < 80 MM HG: ICD-10-PCS | Mod: CPTII,S$GLB,, | Performed by: PHYSICIAN ASSISTANT

## 2022-12-20 PROCEDURE — 1159F MED LIST DOCD IN RCRD: CPT | Mod: CPTII,S$GLB,, | Performed by: PHYSICIAN ASSISTANT

## 2022-12-20 PROCEDURE — 99999 PR PBB SHADOW E&M-EST. PATIENT-LVL IV: ICD-10-PCS | Mod: PBBFAC,,, | Performed by: PHYSICIAN ASSISTANT

## 2022-12-20 PROCEDURE — 99396 PR PREVENTIVE VISIT,EST,40-64: ICD-10-PCS | Mod: S$GLB,,, | Performed by: PHYSICIAN ASSISTANT

## 2022-12-20 PROCEDURE — 3008F PR BODY MASS INDEX (BMI) DOCUMENTED: ICD-10-PCS | Mod: CPTII,S$GLB,, | Performed by: PHYSICIAN ASSISTANT

## 2022-12-20 PROCEDURE — 3008F BODY MASS INDEX DOCD: CPT | Mod: CPTII,S$GLB,, | Performed by: PHYSICIAN ASSISTANT

## 2022-12-20 PROCEDURE — 1160F PR REVIEW ALL MEDS BY PRESCRIBER/CLIN PHARMACIST DOCUMENTED: ICD-10-PCS | Mod: CPTII,S$GLB,, | Performed by: PHYSICIAN ASSISTANT

## 2022-12-20 PROCEDURE — 1159F PR MEDICATION LIST DOCUMENTED IN MEDICAL RECORD: ICD-10-PCS | Mod: CPTII,S$GLB,, | Performed by: PHYSICIAN ASSISTANT

## 2022-12-20 PROCEDURE — 99999 PR PBB SHADOW E&M-EST. PATIENT-LVL IV: CPT | Mod: PBBFAC,,, | Performed by: PHYSICIAN ASSISTANT

## 2022-12-20 RX ORDER — CARVEDILOL 3.12 MG/1
3.12 TABLET ORAL 2 TIMES DAILY WITH MEALS
Qty: 180 TABLET | Refills: 4 | Status: SHIPPED | OUTPATIENT
Start: 2022-12-20 | End: 2024-02-19 | Stop reason: SDUPTHER

## 2022-12-20 RX ORDER — LEVOTHYROXINE SODIUM 50 UG/1
50 TABLET ORAL
Qty: 90 TABLET | Refills: 4 | Status: SHIPPED | OUTPATIENT
Start: 2022-12-20 | End: 2023-05-04 | Stop reason: SDUPTHER

## 2022-12-20 RX ORDER — LEVOTHYROXINE SODIUM 50 UG/1
50 TABLET ORAL
Qty: 30 TABLET | Refills: 11 | Status: SHIPPED | OUTPATIENT
Start: 2022-12-20 | End: 2023-05-04

## 2022-12-20 NOTE — PROGRESS NOTES
Subjective:       Patient ID: Jessica Nelson MD is a 52 y.o. female.        Chief Complaint: No chief complaint on file.    Jessica Nelson MD is an established patient of Joya Terry MD (Inactive) here today for annual exam.    Overall doing well.  Not exercising as much.  Plans to start and will also reduce egg yolks in diet.      Cholesterol higher than prior years but HDL is very high.  The 10-year ASCVD risk score (Farida DK, et al., 2019) is: 0.9%    Values used to calculate the score:      Age: 52 years      Sex: Female      Is Non- : No      Diabetic: No      Tobacco smoker: No      Systolic Blood Pressure: 110 mmHg      Is BP treated: Yes      HDL Cholesterol: 98 mg/dL      Total Cholesterol: 275 mg/dL    Varicose veins - RLE for several years, one episode of phlebitis that resolved, now wearing compression stockings, which are helping, s/p R GSV evlt + stabs     HTN - taking coreg 3.125 now only once daily, previously intolerant of amlodipine 2.5 mg/day (headache), lisinopril (cough), losartan (fatigue), and dyazide (37.5-25 mg) 1/4 tablet daily (excessive urination and dehydration), blood pressure well controlled on current regimen     Hypothyroidism - started synthroid 25 mcg 12/2021     Retinal lattice degeneration - due for exam     Depression - stable on lexapro 10 mg daily     Family history of breast cancer - mother dx at age 67, mammogram scheduled             Review of Systems   Constitutional:  Negative for chills, diaphoresis, fatigue and fever.   HENT:  Negative for congestion and sore throat.    Eyes:  Negative for visual disturbance.   Respiratory:  Negative for cough, chest tightness and shortness of breath.    Cardiovascular:  Negative for chest pain, palpitations and leg swelling.   Gastrointestinal:  Negative for abdominal pain, blood in stool, constipation, diarrhea, nausea and vomiting.   Genitourinary:  Negative for dysuria, frequency, hematuria and  urgency.   Musculoskeletal:  Negative for arthralgias and back pain.   Skin:  Negative for rash.   Neurological:  Negative for dizziness, syncope, weakness and headaches.   Psychiatric/Behavioral:  Negative for dysphoric mood and sleep disturbance. The patient is not nervous/anxious.      Objective:      Physical Exam  Vitals and nursing note reviewed.   Constitutional:       Appearance: Normal appearance. She is well-developed.   HENT:      Head: Normocephalic.      Right Ear: Tympanic membrane and external ear normal.      Left Ear: Tympanic membrane and external ear normal.      Nose: No mucosal edema or rhinorrhea.      Mouth/Throat:      Pharynx: Oropharynx is clear.   Eyes:      Pupils: Pupils are equal, round, and reactive to light.   Cardiovascular:      Rate and Rhythm: Normal rate and regular rhythm.      Heart sounds: Normal heart sounds. No murmur heard.    No friction rub. No gallop.   Pulmonary:      Effort: Pulmonary effort is normal. No respiratory distress.      Breath sounds: Normal breath sounds.   Chest:   Breasts:     Right: Normal.      Left: Normal.   Abdominal:      Palpations: Abdomen is soft.      Tenderness: There is no abdominal tenderness.   Skin:     General: Skin is warm and dry.   Neurological:      Mental Status: She is alert.       Assessment:       1. Encounter for preventive health examination    2. Hypothyroidism, unspecified type    3. Essential hypertension    4. Encounter for screening for cardiovascular disorders    5. Bilateral retinal lattice degeneration    6. Screening for lipid disorders    7. Venous insufficiency of both lower extremities    8. Family history of breast cancer          Plan:       Diagnoses and all orders for this visit:    Encounter for preventive health examination - pre visit lab work reviewed, cholesterol higher than prior years so will recheck in 3 months, work on exercise, low cholesterol diet    Hypothyroidism, unspecified type  -     INCREASE:  "levothyroxine (SYNTHROID) 50 MCG tablet; Take 1 tablet (50 mcg total) by mouth before breakfast.  -     TSH; Future - in 3 months    Essential hypertension - stable and controlled  -     REFILL: carvediloL (COREG) 3.125 MG tablet; Take 1 tablet (3.125 mg total) by mouth 2 (two) times daily with meals.    Encounter for screening for cardiovascular disorders  -     CT Cardiac Scoring; Future    Bilateral retinal lattice degeneration  -     Ambulatory referral/consult to Ophthalmology; Future    Screening for lipid disorders  -     Lipid Panel; Future    Venous insufficiency of both lower extremities - wearing compression stockings    Family history of breast cancer - mammogram scheduled    Other orders  -     levothyroxine (SYNTHROID) 50 MCG tablet; Take 1 tablet (50 mcg total) by mouth before breakfast.    Pt has been given instructions populated from patient instructions database and has verbalized understanding of the after visit summary and information contained wherein.    Follow up with a primary care provider. May go to ER for acute shortness of breath, lightheadedness, fever, or any other emergent complaints or changes in condition.    "This note will be shared with the patient"    Future Appointments   Date Time Provider Department Center   1/30/2023  3:45 PM [id number] [facility]                 "

## 2023-01-30 ENCOUNTER — HOSPITAL ENCOUNTER (OUTPATIENT)
Dept: RADIOLOGY | Facility: HOSPITAL | Age: 53
Discharge: HOME | End: 2023-01-30
Attending: PHYSICIAN ASSISTANT
Payer: COMMERCIAL

## 2023-01-30 DIAGNOSIS — Z12.31 SCREENING MAMMOGRAM, ENCOUNTER FOR: ICD-10-CM

## 2023-01-30 PROCEDURE — 77067 SCR MAMMO BI INCL CAD: CPT | Mod: 26,,, | Performed by: RADIOLOGY

## 2023-01-30 PROCEDURE — 77063 BREAST TOMOSYNTHESIS BI: CPT | Mod: 26,,, | Performed by: RADIOLOGY

## 2023-01-30 PROCEDURE — 77067 MAMMO DIGITAL SCREENING BILAT WITH TOMO: ICD-10-PCS | Mod: 26,,, | Performed by: RADIOLOGY

## 2023-01-30 PROCEDURE — 77063 MAMMO DIGITAL SCREENING BILAT WITH TOMO: ICD-10-PCS | Mod: 26,,, | Performed by: RADIOLOGY

## 2023-01-30 PROCEDURE — 77067 SCR MAMMO BI INCL CAD: CPT | Mod: TC

## 2023-05-04 ENCOUNTER — OFFICE VISIT (OUTPATIENT)
Dept: PRIMARY CARE CLINIC | Facility: CLINIC | Age: 53
End: 2023-05-04
Payer: COMMERCIAL

## 2023-05-04 VITALS
SYSTOLIC BLOOD PRESSURE: 119 MMHG | WEIGHT: 143.5 LBS | HEART RATE: 61 BPM | HEIGHT: 70 IN | OXYGEN SATURATION: 99 % | BODY MASS INDEX: 20.54 KG/M2 | DIASTOLIC BLOOD PRESSURE: 71 MMHG | RESPIRATION RATE: 18 BRPM | TEMPERATURE: 98 F

## 2023-05-04 DIAGNOSIS — H35.413 BILATERAL RETINAL LATTICE DEGENERATION: ICD-10-CM

## 2023-05-04 DIAGNOSIS — Z78.0 POSTMENOPAUSAL: ICD-10-CM

## 2023-05-04 DIAGNOSIS — Z00.00 PREVENTATIVE HEALTH CARE: Primary | ICD-10-CM

## 2023-05-04 DIAGNOSIS — E03.9 HYPOTHYROIDISM, UNSPECIFIED TYPE: ICD-10-CM

## 2023-05-04 DIAGNOSIS — I87.2 VENOUS INSUFFICIENCY OF BOTH LOWER EXTREMITIES: ICD-10-CM

## 2023-05-04 DIAGNOSIS — F41.1 GAD (GENERALIZED ANXIETY DISORDER): ICD-10-CM

## 2023-05-04 PROCEDURE — 99396 PREV VISIT EST AGE 40-64: CPT | Mod: S$GLB,,, | Performed by: INTERNAL MEDICINE

## 2023-05-04 PROCEDURE — 1159F PR MEDICATION LIST DOCUMENTED IN MEDICAL RECORD: ICD-10-PCS | Mod: CPTII,S$GLB,, | Performed by: INTERNAL MEDICINE

## 2023-05-04 PROCEDURE — 99396 PR PREVENTIVE VISIT,EST,40-64: ICD-10-PCS | Mod: S$GLB,,, | Performed by: INTERNAL MEDICINE

## 2023-05-04 PROCEDURE — 3078F DIAST BP <80 MM HG: CPT | Mod: CPTII,S$GLB,, | Performed by: INTERNAL MEDICINE

## 2023-05-04 PROCEDURE — 99999 PR PBB SHADOW E&M-EST. PATIENT-LVL IV: CPT | Mod: PBBFAC,,, | Performed by: INTERNAL MEDICINE

## 2023-05-04 PROCEDURE — 3008F BODY MASS INDEX DOCD: CPT | Mod: CPTII,S$GLB,, | Performed by: INTERNAL MEDICINE

## 2023-05-04 PROCEDURE — 3078F PR MOST RECENT DIASTOLIC BLOOD PRESSURE < 80 MM HG: ICD-10-PCS | Mod: CPTII,S$GLB,, | Performed by: INTERNAL MEDICINE

## 2023-05-04 PROCEDURE — 3074F PR MOST RECENT SYSTOLIC BLOOD PRESSURE < 130 MM HG: ICD-10-PCS | Mod: CPTII,S$GLB,, | Performed by: INTERNAL MEDICINE

## 2023-05-04 PROCEDURE — 3074F SYST BP LT 130 MM HG: CPT | Mod: CPTII,S$GLB,, | Performed by: INTERNAL MEDICINE

## 2023-05-04 PROCEDURE — 1159F MED LIST DOCD IN RCRD: CPT | Mod: CPTII,S$GLB,, | Performed by: INTERNAL MEDICINE

## 2023-05-04 PROCEDURE — 3008F PR BODY MASS INDEX (BMI) DOCUMENTED: ICD-10-PCS | Mod: CPTII,S$GLB,, | Performed by: INTERNAL MEDICINE

## 2023-05-04 PROCEDURE — 99999 PR PBB SHADOW E&M-EST. PATIENT-LVL IV: ICD-10-PCS | Mod: PBBFAC,,, | Performed by: INTERNAL MEDICINE

## 2023-05-04 RX ORDER — LEVOTHYROXINE SODIUM 75 UG/1
75 TABLET ORAL
Qty: 90 TABLET | Refills: 3 | Status: SHIPPED | OUTPATIENT
Start: 2023-05-04 | End: 2024-05-03

## 2023-05-04 RX ORDER — ESCITALOPRAM OXALATE 10 MG/1
10 TABLET ORAL DAILY
Qty: 90 TABLET | Refills: 3 | Status: SHIPPED | OUTPATIENT
Start: 2023-05-04 | End: 2023-08-02

## 2023-05-04 NOTE — PROGRESS NOTES
[facility]      Chief Complaint     Establish Care and Annual Exam       Subjective:      History of Present Illness:  Jessica Nelson MD is a 52 y.o. female here to establish care     CT coronary artery score: for risk stratification; costs $250    HTN on OCP: takes coreg 3.125 mg in pm and prn     CLARISSA: on Lexapro 10mg in early 2000 during personal stressors     CVI: ESVL bilaterally by Dr. Orellana in 11/2020and sclerotherapy  Dr. Plaza in 3/2021    Menopause Dec 2018: occasional hot flashes; intrarosa prn vaginal dryness     SH: primary care 80% main campus; no tobacco, drugs, alcohol - 1 beer 3-4 times per week;  17 y/o son jeanne Alexander; severe ADHD - going to [employer] in [facility] ; 22 y/o freshman at [employer]- studying Biology; field camp in [place]; ; walks with  30 min 5 times per week   FH: mom osteoporosis;  breast cancer 67 y/o, Parkinson's disease 79 y/o; dad HTN, HLD now 84 y/o; siblings fine     Past Medical History:  Past Medical History:   Diagnosis Date    Alopecia areata 1990    Resolved. Possibly secondary to CMV    Benign essential HTN 7/29/2015    Bilateral retinal lattice degeneration     Depression 11/29/2012    Essential hypertension 7/29/2015    Nodular fasciitis 2005    LUE    Subclinical hypothyroidism 2012    Venous insufficiency        Past Surgical History:  Past Surgical History:   Procedure Laterality Date    COLONOSCOPY N/A 12/17/2020    Procedure: COLONOSCOPY;  Surgeon: Ida Donaldson MD;  Location: [address];  Service: Endoscopy;  Laterality: N/A;  Per JOVANY Sorto, Pt having constipation and LLQ abdominal pain, diagnostic colonoscopy- [id number]/19/20@[id number]  COVID test on 12/14/20 at Primary Care -     Maxillofacial Surgery  1986    Malocclusion    Nodule Excision  2005    Benign Nodular Fasciitis LUE    RETINAL LASER PROCEDURE  2007    Lattice Degeneration Retina    TONSILLECTOMY          Allergies:  Review of patient's allergies indicates:   Allergen Reactions    Lisinopril Other (See Comments)     cough    Morphine      Other reaction(s): Vomiting  Other reaction(s): Nausea       Home Medications:  Current Outpatient Medications   Medication Sig Dispense Refill    calcium-vitamin D 500-125 mg-unit tablet Take 1 tablet by mouth once daily.      carvediloL (COREG) 3.125 MG tablet Take 1 tablet (3.125 mg total) by mouth 2 (two) times daily with meals. 180 tablet 4    EScitalopram oxalate (LEXAPRO) 10 MG tablet Take 1 tablet (10 mg total) by mouth once daily. 90 tablet 3    levothyroxine (SYNTHROID) 75 MCG tablet Take 1 tablet (75 mcg total) by mouth before breakfast. 90 tablet 3     Current Facility-Administered Medications   Medication Dose Route Frequency Provider Last Rate Last Admin    lidocaine HCL 10 mg/ml (1%) injection 1 mL  1 mL Other 1 time in Clinic/HOD DEX Plaza II, MD            Family History:  Family History   Problem Relation Age of Onset    Hypertension Mother     Hyperlipidemia Mother     Breast cancer Mother 67    Hypertension Father     Hyperlipidemia Father     Ovarian cancer Paternal Aunt     Thyroid disease Neg Hx     Amblyopia Neg Hx     Blindness Neg Hx     Cancer Neg Hx     Cataracts Neg Hx     Diabetes Neg Hx     Glaucoma Neg Hx     Macular degeneration Neg Hx     Retinal detachment Neg Hx     Strabismus Neg Hx     Stroke Neg Hx        Social History:  Social History     Tobacco Use    Smoking status: Never    Smokeless tobacco: Never   Substance Use Topics    Alcohol use: Yes     Comment: Rare    Drug use: No       Review of Systems:  Pertinent positives and negatives listed in HPI. All other systems are reviewed and are negative.    Health Maintaince :   Health Maintenance Topics with due status: Not Due       Topic Last Completion Date    TETANUS VACCINE 11/29/2016    Colorectal Cancer Screening 12/17/2020    Cervical Cancer Screening 04/07/2022    Lipid  "Panel 12/15/2022    Mammogram 01/30/2023           Eye: UTD - needs to schedule appt   Dental: gum recession; needs to schedule     Immunizations:   Tdap: 2016.  Influenza: UTD.  Pneumovax: n/a  Shingrex x 2: needs; had Shingles August 2020  COVID: needs booster   Hepatitis C:   Cancer Screening:  PAP: UTD 4/2022  Mammogram: UTD   Colonoscopy: 12/2020 good x 10 years  DEXA:  needs baseline  LDCT: n/a    The 10-year ASCVD risk score (Farida NUÑEZ, et al., 2019) is: 1.1%    Values used to calculate the score:      Age: 52 years      Sex: Female      Is Non- : No      Diabetic: No      Tobacco smoker: No      Systolic Blood Pressure: 119 mmHg      Is BP treated: Yes      HDL Cholesterol: 98 mg/dL      Total Cholesterol: 275 mg/dL      Objective:   /71 (BP Location: Left arm, Patient Position: Sitting, BP Method: Medium (Manual))   Pulse 61   Temp 98 °F (36.7 °C)   Resp 18   Ht 5' 10" (1.778 m)   Wt 65.1 kg (143 lb 8.3 oz)   LMP 11/04/2019   SpO2 99%   BMI 20.59 kg/m²      Body mass index is 20.59 kg/m².       Physical Examination:  General: Alert and awake in no apparent distress  HEENT: Normocephalic and atraumatic; Tms WNL  Eyes:  PERRL; EOMi with anicteric sclera and clear conjunctivae  Mouth:  Oropharynx clear and without exudate; moist mucous membranes  Neck:   Cervical nodes not enlarged; supple; no bruits  Cardio:  Regular rate and rhythm with normal S1 and S2; no murmurs or rubs  Resp:  CTAB; respirations unlabored; no wheezes, crackles or rhonchi  Abdom: Soft, NTND with normoactive bowel sounds; negative HSM  Extrem: Warm and well-perfused with no clubbing, cyanosis or edema  Skin:  No rashes, lesions, or color changes  Pulses:  2+ and symmetric distally  Neuro:  AAOx3; cooperative and pleasant with no focal deficits    Laboratory:      Most Recent Data:  Lab Results   Component Value Date    WBC 3.06 (L) 12/15/2022    HGB 12.8 12/15/2022    HCT 38.7 12/15/2022     " 12/15/2022    CHOL 275 (H) 12/15/2022    TRIG 71 12/15/2022    HDL 98 (H) 12/15/2022    ALT 20 12/15/2022    AST 22 12/15/2022     12/15/2022    K 4.4 12/15/2022     12/15/2022    BUN 18 12/15/2022    CO2 27 12/15/2022    TSH 4.068 (H) 12/15/2022              CBC:   WBC   Date Value Ref Range Status   12/15/2022 3.06 (L) 3.90 - 12.70 K/uL Final     Hemoglobin   Date Value Ref Range Status   12/15/2022 12.8 12.0 - 16.0 g/dL Final     Hematocrit   Date Value Ref Range Status   12/15/2022 38.7 37.0 - 48.5 % Final     Platelets   Date Value Ref Range Status   12/15/2022 212 150 - 450 K/uL Final     MCV   Date Value Ref Range Status   12/15/2022 91 82 - 98 fL Final     RDW   Date Value Ref Range Status   12/15/2022 11.9 11.5 - 14.5 % Final     BMP:   Sodium   Date Value Ref Range Status   12/15/2022 138 136 - 145 mmol/L Final     Potassium   Date Value Ref Range Status   12/15/2022 4.4 3.5 - 5.1 mmol/L Final     Chloride   Date Value Ref Range Status   12/15/2022 103 95 - 110 mmol/L Final     CO2   Date Value Ref Range Status   12/15/2022 27 23 - 29 mmol/L Final     BUN   Date Value Ref Range Status   12/15/2022 18 6 - 20 mg/dL Final     Creatinine   Date Value Ref Range Status   12/15/2022 0.8 0.5 - 1.4 mg/dL Final     Glucose   Date Value Ref Range Status   12/15/2022 86 70 - 110 mg/dL Final     Calcium   Date Value Ref Range Status   12/15/2022 9.9 8.7 - 10.5 mg/dL Final     Magnesium   Date Value Ref Range Status   07/29/2015 2.5 1.6 - 2.6 mg/dL Final     LFTs:   Total Protein   Date Value Ref Range Status   12/15/2022 7.4 6.0 - 8.4 g/dL Final     Albumin   Date Value Ref Range Status   12/15/2022 4.4 3.5 - 5.2 g/dL Final     Total Bilirubin   Date Value Ref Range Status   12/15/2022 0.9 0.1 - 1.0 mg/dL Final     Comment:     For infants and newborns, interpretation of results should be based  on gestational age, weight and in agreement with clinical  observations.    Premature Infant recommended  reference ranges:  Up to 24 hours.............<8.0 mg/dL  Up to 48 hours............<12.0 mg/dL  3-5 days..................<15.0 mg/dL  6-29 days.................<15.0 mg/dL       AST   Date Value Ref Range Status   12/15/2022 22 10 - 40 U/L Final     Alkaline Phosphatase   Date Value Ref Range Status   12/15/2022 52 (L) 55 - 135 U/L Final     ALT   Date Value Ref Range Status   12/15/2022 20 10 - 44 U/L Final     Coags: No results found for: INR, PROTIME, PTT  FLP:      Lab Results   Component Value Date    CHOL 275 (H) 12/15/2022    CHOL 236 (H) 12/20/2021    CHOL 250 (H) 11/13/2020     Lab Results   Component Value Date    HDL 98 (H) 12/15/2022    HDL 85 (H) 12/20/2021     (H) 11/13/2020     Lab Results   Component Value Date    LDLCALC 162.8 (H) 12/15/2022    LDLCALC 139.6 12/20/2021    LDLCALC 138.4 11/13/2020     Lab Results   Component Value Date    TRIG 71 12/15/2022    TRIG 57 12/20/2021    TRIG 53 11/13/2020     Lab Results   Component Value Date    CHOLHDL 35.6 12/15/2022    CHOLHDL 36.0 12/20/2021    CHOLHDL 40.4 11/13/2020      DM:      LDL Cholesterol   Date Value Ref Range Status   12/15/2022 162.8 (H) 63.0 - 159.0 mg/dL Final     Comment:     The National Cholesterol Education Program (NCEP) has set the  following guidelines (reference values) for LDL Cholesterol:  Optimal.......................<130 mg/dL  Borderline High...............130-159 mg/dL  High..........................160-189 mg/dL  Very High.....................>190 mg/dL       Creatinine   Date Value Ref Range Status   12/15/2022 0.8 0.5 - 1.4 mg/dL Final     Thyroid:   TSH   Date Value Ref Range Status   12/15/2022 4.068 (H) 0.400 - 4.000 uIU/mL Final     Free T4   Date Value Ref Range Status   12/15/2022 0.92 0.71 - 1.51 ng/dL Final     Anemia:   Iron   Date Value Ref Range Status   12/15/2022 125 30 - 160 ug/dL Final     TIBC   Date Value Ref Range Status   12/15/2022 363 250 - 450 ug/dL Final     Ferritin   Date Value Ref  Range Status   12/15/2022 107 20.0 - 300.0 ng/mL Final     Vitamin B-12   Date Value Ref Range Status   11/12/2009 579 210 - 950 pg/ml Final     Cardiac: No results found for: TROPONINI, CKTOTAL, CKMB, BNP  Urinalysis:   Urine Culture, Routine   Date Value Ref Range Status   02/09/2022   Final    Multiple organisms isolated. None in predominance.  Repeat if   02/09/2022 clinically necessary.  Final     Color, UA   Date Value Ref Range Status   02/09/2022 Brown (A) Yellow, Straw, Blanka Final     Specific Gravity, UA   Date Value Ref Range Status   02/09/2022 1.030 1.005 - 1.030 Final     Nitrite, UA   Date Value Ref Range Status   02/09/2022 Positive (A) Negative Final     Ketones, UA   Date Value Ref Range Status   02/09/2022 Negative Negative Final     Urobilinogen, UA   Date Value Ref Range Status   12/01/2016 Negative <2.0 EU/dL Final     WBC, UA   Date Value Ref Range Status   02/09/2022 49 (H) 0 - 5 /hpf Final       Other Results:  EKG (my interpretation):     Radiology:  Mammo Digital Screening Bilat w/ Aldo  Narrative: Result:  Mammo Digital Screening Bilat w/ Aldo    History:  Patient is 52 y.o. and is seen for a screening mammogram.    Films Compared:  Compared to: 01/27/2022 Mammo Digital Screening Bilat w/ Aldo, 08/27/2020   Mammo Digital Screening Bilat w/ Aldo, and 02/04/2019 Mammo Digital   Diagnostic Right w/ Aldo     Findings:   This procedure was performed using tomosynthesis.   Computer-aided detection was utilized in the interpretation of this   examination.    The breasts have scattered areas of fibroglandular density. There is no   evidence of suspicious masses, microcalcifications or architectural   distortion.  Impression:    No mammographic evidence of malignancy.    BI-RADS Category 1: Negative    Recommendation:  Routine screening mammogram in 1 year is recommended.    Your estimated lifetime risk of breast cancer (to age 85) based on   Tyrer-Cuzick risk assessment model is 15.62 %.   According to the American   Cancer Society, patients with a lifetime breast cancer risk of 20% or   higher might benefit from supplemental screening tests. ??           Assessment/Plan     Jessica Nelson MD is a 52 y.o. female with:  1. Preventative health care  Assessment & Plan:  Get Shingrex  Get COVID booster  Make eye and dental appts  Check thyroid and lipid in 6 wks  Get CT calcium score this Monday  Schedule DEXA       2. Bilateral retinal lattice degeneration  Overview:  S/P Laser Rx 2007    Assessment & Plan:  Needs to schedule appt this year      3. Postmenopausal  -     DXA Bone Density Axial Skeleton 1 or more sites; Future; Expected date: 05/04/2023    4. Hypothyroidism, unspecified type  Assessment & Plan:  Increase Levo 75mcg po daily   Check TSH, free T4 in 6 weeks  -check anti-thyroid antibodies    Orders:  -     levothyroxine (SYNTHROID) 75 MCG tablet; Take 1 tablet (75 mcg total) by mouth before breakfast.  Dispense: 90 tablet; Refill: 3    5. CLARISSA (generalized anxiety disorder)  Assessment & Plan:  -cont lexapro 10mg daily       Orders:  -     EScitalopram oxalate (LEXAPRO) 10 MG tablet; Take 1 tablet (10 mg total) by mouth once daily.  Dispense: 90 tablet; Refill: 3    6. Venous insufficiency of both lower extremities  Overview:  ESVL bilaterally by Dr. Orellana in 11/2020and sclerotherapy  Dr. Plaza in 3/2021    Assessment & Plan:  -cont compression socks and elevate legs               I spent a total of 40 minutes on the day of the visit.This includes face to face time and non-face to face time preparing to see the patient (eg, review of tests), obtaining and/or reviewing separately obtained history, documenting clinical information in the electronic or other health record, independently interpreting results and communicating results to the patient/family/caregiver, or care coordinator.   Code Status:     Angela Gerardo MD

## 2023-05-04 NOTE — ASSESSMENT & PLAN NOTE
Get Shingrex  Get COVID booster  Make eye and dental appts  Check thyroid and lipid in 6 wks  Get CT calcium score this Monday  Schedule DEXA

## 2023-05-04 NOTE — PATIENT INSTRUCTIONS
Get Shingrex  Get COVID booster  Make eye and dental appts  Check thyroid and lipid in 6 wks  Get CT calcium score  Schedule DEXA

## 2023-05-08 ENCOUNTER — HOSPITAL ENCOUNTER (OUTPATIENT)
Dept: RADIOLOGY | Facility: HOSPITAL | Age: 53
Discharge: HOME | End: 2023-05-08
Attending: PHYSICIAN ASSISTANT
Payer: COMMERCIAL

## 2023-05-08 DIAGNOSIS — Z13.6 ENCOUNTER FOR SCREENING FOR CARDIOVASCULAR DISORDERS: ICD-10-CM

## 2023-05-08 PROCEDURE — 75571 CT HRT W/O DYE W/CA TEST: CPT | Mod: 26,,, | Performed by: RADIOLOGY

## 2023-05-08 PROCEDURE — 75571 CT CALCIUM SCORING CARDIAC: ICD-10-PCS | Mod: 26,,, | Performed by: RADIOLOGY

## 2023-05-08 PROCEDURE — 75571 CT HRT W/O DYE W/CA TEST: CPT | Mod: TC

## 2023-07-27 ENCOUNTER — HOSPITAL ENCOUNTER (OUTPATIENT)
Dept: RADIOLOGY | Facility: CLINIC | Age: 53
Discharge: HOME | End: 2023-07-27
Attending: INTERNAL MEDICINE
Payer: COMMERCIAL

## 2023-07-27 DIAGNOSIS — Z78.0 POSTMENOPAUSAL: ICD-10-CM

## 2023-07-27 PROCEDURE — 77080 DXA BONE DENSITY AXIAL: CPT | Mod: 26,,, | Performed by: INTERNAL MEDICINE

## 2023-07-27 PROCEDURE — 77080 DXA BONE DENSITY AXIAL: CPT | Mod: TC

## 2023-07-27 PROCEDURE — 77080 DXA BONE DENSITY AXIAL SKELETON 1 OR MORE SITES: ICD-10-PCS | Mod: 26,,, | Performed by: INTERNAL MEDICINE

## 2023-08-07 PROBLEM — Z00.00 PREVENTATIVE HEALTH CARE: Status: RESOLVED | Noted: 2020-12-17 | Resolved: 2023-08-07

## 2023-11-29 ENCOUNTER — IMMUNIZATION (OUTPATIENT)
Dept: INTERNAL MEDICINE | Facility: CLINIC | Age: 53
End: 2023-11-29
Payer: COMMERCIAL

## 2023-11-29 DIAGNOSIS — Z23 NEED FOR VACCINATION: Primary | ICD-10-CM

## 2023-11-29 PROCEDURE — 90686 IIV4 VACC NO PRSV 0.5 ML IM: CPT | Mod: S$GLB,,, | Performed by: INTERNAL MEDICINE

## 2023-11-29 PROCEDURE — 90686 FLU VACCINE (QUAD) GREATER THAN OR EQUAL TO 3YO PRESERVATIVE FREE IM: ICD-10-PCS | Mod: S$GLB,,, | Performed by: INTERNAL MEDICINE

## 2023-11-29 PROCEDURE — G0008 ADMIN INFLUENZA VIRUS VAC: HCPCS | Mod: S$GLB,,, | Performed by: INTERNAL MEDICINE

## 2023-11-29 PROCEDURE — G0008 FLU VACCINE (QUAD) GREATER THAN OR EQUAL TO 3YO PRESERVATIVE FREE IM: ICD-10-PCS | Mod: S$GLB,,, | Performed by: INTERNAL MEDICINE

## 2023-12-28 DIAGNOSIS — I10 ESSENTIAL HYPERTENSION: ICD-10-CM

## 2024-01-22 ENCOUNTER — PATIENT MESSAGE (OUTPATIENT)
Dept: PRIMARY CARE CLINIC | Facility: CLINIC | Age: 54
End: 2024-01-22
Payer: COMMERCIAL

## 2024-01-22 DIAGNOSIS — Z12.31 ENCOUNTER FOR SCREENING MAMMOGRAM FOR MALIGNANT NEOPLASM OF BREAST: Primary | ICD-10-CM

## 2024-02-08 ENCOUNTER — HOSPITAL ENCOUNTER (OUTPATIENT)
Dept: RADIOLOGY | Facility: OTHER | Age: 54
Discharge: HOME | End: 2024-02-08
Attending: INTERNAL MEDICINE
Payer: COMMERCIAL

## 2024-02-08 DIAGNOSIS — Z12.31 ENCOUNTER FOR SCREENING MAMMOGRAM FOR MALIGNANT NEOPLASM OF BREAST: ICD-10-CM

## 2024-02-08 PROCEDURE — 77067 SCR MAMMO BI INCL CAD: CPT | Mod: TC

## 2024-02-08 PROCEDURE — 77063 BREAST TOMOSYNTHESIS BI: CPT | Mod: 26,,, | Performed by: RADIOLOGY

## 2024-02-08 PROCEDURE — 77067 SCR MAMMO BI INCL CAD: CPT | Mod: 26,,, | Performed by: RADIOLOGY

## 2024-02-19 DIAGNOSIS — I10 ESSENTIAL HYPERTENSION: ICD-10-CM

## 2024-02-20 RX ORDER — CARVEDILOL 3.12 MG/1
3.12 TABLET ORAL 2 TIMES DAILY WITH MEALS
Qty: 180 TABLET | Refills: 3 | Status: SHIPPED | OUTPATIENT
Start: 2024-02-20

## 2024-02-20 NOTE — TELEPHONE ENCOUNTER
Refill Routing Note   Medication(s) are not appropriate for processing by [facility] for the following reason(s):        No active prescription written by provider:     [facility] action(s):  Defer   Requires appointment : Yes     Requires labs : Yes    Medication Therapy Plan:  Due for annual with PCP, labs      Appointments  past 12m or future 3m with PCP    Date Provider   Last Visit   5/4/2023 Angela Gerardo MD   Next Visit   Visit date not found Angela Gerardo MD   ED visits in past 90 days: 0        Note composed:8:13 AM 02/20/2024

## 2024-02-23 ENCOUNTER — HOSPITAL ENCOUNTER (OUTPATIENT)
Dept: RADIOLOGY | Facility: HOSPITAL | Age: 54
Discharge: HOME | End: 2024-02-23
Attending: NURSE PRACTITIONER
Payer: COMMERCIAL

## 2024-02-23 DIAGNOSIS — M79.644 PAIN OF RIGHT THUMB: ICD-10-CM

## 2024-02-23 DIAGNOSIS — M79.644 PAIN OF RIGHT THUMB: Primary | ICD-10-CM

## 2024-02-23 PROCEDURE — 73140 X-RAY EXAM OF FINGER(S): CPT | Mod: TC,RT

## 2024-02-23 PROCEDURE — 73140 X-RAY EXAM OF FINGER(S): CPT | Mod: 26,RT,, | Performed by: RADIOLOGY

## 2024-02-23 NOTE — PROGRESS NOTES
Right thumb pain, thinks she may have broken it. Xrays ordered and orthopedic referral placed.    Larissa Shields DNP, FNP-C

## 2024-03-13 ENCOUNTER — TELEPHONE (OUTPATIENT)
Dept: ORTHOPEDICS | Facility: CLINIC | Age: 54
End: 2024-03-13
Payer: COMMERCIAL

## 2024-03-13 NOTE — TELEPHONE ENCOUNTER
I called the patient and reminded them of their appointment on 03/21/2024 at 9:00 a.m. I informed them where they need to go and to arrive 15 minutes before their appointment. The patient understood and agreed.

## 2024-03-21 ENCOUNTER — OFFICE VISIT (OUTPATIENT)
Dept: ORTHOPEDICS | Facility: CLINIC | Age: 54
End: 2024-03-21
Payer: COMMERCIAL

## 2024-03-21 DIAGNOSIS — R22.31 LOCALIZED SWELLING, MASS, OR LUMP OF RIGHT UPPER EXTREMITY: Primary | ICD-10-CM

## 2024-03-21 DIAGNOSIS — M79.644 PAIN OF RIGHT THUMB: ICD-10-CM

## 2024-03-21 PROCEDURE — 99999 PR PBB SHADOW E&M-EST. PATIENT-LVL II: CPT | Mod: PBBFAC,,, | Performed by: ORTHOPAEDIC SURGERY

## 2024-03-21 PROCEDURE — 99204 OFFICE O/P NEW MOD 45 MIN: CPT | Mod: S$GLB,,, | Performed by: ORTHOPAEDIC SURGERY

## 2024-03-21 NOTE — PROGRESS NOTES
History of Present Illness     Patient Identification  Jessica Nelson MD is a 53 y.o. female.        Chief Complaint   No chief complaint on file.      Patient presents for evaluation of an injury to her right hand.     3 weeks ago ( feb 22nd)- fell while holding her water bottle, felt her right thumb dislocate and she put it back in place. She has been wearing a braces, it was swollen and bruised- painful and weak. Piinching and     Past Medical History:   Diagnosis Date    Alopecia areata 1990    Resolved. Possibly secondary to CMV    Benign essential HTN 7/29/2015    Bilateral retinal lattice degeneration     Depression 11/29/2012    Essential hypertension 7/29/2015    Nodular fasciitis 2005    LUE    Subclinical hypothyroidism 2012    Venous insufficiency      Family History   Problem Relation Age of Onset    Hypertension Mother     Hyperlipidemia Mother     Breast cancer Mother 67    Hypertension Father     Hyperlipidemia Father     Ovarian cancer Paternal Aunt     Thyroid disease Neg Hx     Amblyopia Neg Hx     Blindness Neg Hx     Cancer Neg Hx     Cataracts Neg Hx     Diabetes Neg Hx     Glaucoma Neg Hx     Macular degeneration Neg Hx     Retinal detachment Neg Hx     Strabismus Neg Hx     Stroke Neg Hx      Current Outpatient Medications   Medication Sig Dispense Refill    calcium-vitamin D 500-125 mg-unit tablet Take 1 tablet by mouth once daily.      carvediloL (COREG) 3.125 MG tablet Take 1 tablet (3.125 mg total) by mouth 2 (two) times daily with meals. Due for annual with PCP, labs 180 tablet 3    EScitalopram oxalate (LEXAPRO) 10 MG tablet Take 1 tablet (10 mg total) by mouth once daily. 90 tablet 3    levothyroxine (SYNTHROID) 75 MCG tablet Take 1 tablet (75 mcg total) by mouth before breakfast. 90 tablet 3     Current Facility-Administered Medications   Medication Dose Route Frequency Provider Last Rate Last Admin    lidocaine HCL 10 mg/ml (1%) injection 1 mL  1 mL Other 1 time in Clinic/HOD  DEX Plaza II, MD         Review of patient's allergies indicates:   Allergen Reactions    Lisinopril Other (See Comments)     cough    Morphine      Other reaction(s): Vomiting  Other reaction(s): Nausea     Social History     Socioeconomic History    Marital status:     Number of children: 2    Years of education: MD   Occupational History    Occupation: General Internist     Employer: [employer]   Tobacco Use    Smoking status: Never    Smokeless tobacco: Never   Substance and Sexual Activity    Alcohol use: Yes     Comment: Rare    Drug use: No    Sexual activity: Yes     Partners: Male     Birth control/protection: Partner-Vasectomy     Review of Systems   Pertinent items are noted in HPI.     Physical Exam     Vitals please see computer entry General alert orient x3 well-developed well-nourished no apparent distress from the individual well groomed appears stated age    Skin clean dry and intact no bruising minimal swelling     Bilateral upper extremity median radial ulnar anterior interosseous she has posterior interosseous motor and sensation to light touch intact brisk cap refill full range of motion of fingers and wrist she is tender palpation over the UCL of the thumb right hand she has significant stability instability at 30° and 0° especially it has a 15 degree change from the contralateral side x-rays no fracture plan at this time there is a high suspicion she is got the UCL tear especially because it is very swollen in that area we will get an MRI and has plan for surgical treatment  ED Course

## 2024-03-22 DIAGNOSIS — M79.644 PAIN OF RIGHT THUMB: Primary | ICD-10-CM

## 2024-03-25 ENCOUNTER — ANESTHESIA EVENT (OUTPATIENT)
Dept: SURGERY | Facility: HOSPITAL | Age: 54
End: 2024-03-25
Payer: COMMERCIAL

## 2024-03-26 ENCOUNTER — HOSPITAL ENCOUNTER (OUTPATIENT)
Dept: RADIOLOGY | Facility: HOSPITAL | Age: 54
Discharge: HOME | End: 2024-03-26
Attending: ORTHOPAEDIC SURGERY
Payer: COMMERCIAL

## 2024-03-26 DIAGNOSIS — R22.31 LOCALIZED SWELLING, MASS, OR LUMP OF RIGHT UPPER EXTREMITY: ICD-10-CM

## 2024-03-26 PROCEDURE — 73218 MRI UPPER EXTREMITY W/O DYE: CPT | Mod: 26,RT,, | Performed by: RADIOLOGY

## 2024-03-26 PROCEDURE — 73218 MRI UPPER EXTREMITY W/O DYE: CPT | Mod: TC,RT

## 2024-04-02 DIAGNOSIS — S63.641A RUPTURE OF UCL OF RIGHT THUMB: ICD-10-CM

## 2024-04-02 RX ORDER — OXYCODONE AND ACETAMINOPHEN 5; 325 MG/1; MG/1
1 TABLET ORAL
Qty: 10 TABLET | Refills: 0 | Status: SHIPPED | OUTPATIENT
Start: 2024-04-02

## 2024-04-02 RX ORDER — IBUPROFEN 600 MG/1
600 TABLET ORAL 3 TIMES DAILY PRN
Qty: 45 TABLET | Refills: 0 | Status: SHIPPED | OUTPATIENT
Start: 2024-04-02

## 2024-04-02 RX ORDER — MUPIROCIN 20 MG/G
OINTMENT TOPICAL
Status: CANCELLED | OUTPATIENT
Start: 2024-04-02

## 2024-04-02 RX ORDER — ACETAMINOPHEN 500 MG
1000 TABLET ORAL 2 TIMES DAILY PRN
Qty: 50 TABLET | Refills: 0 | Status: SHIPPED | OUTPATIENT
Start: 2024-04-02

## 2024-04-02 RX ORDER — SODIUM CHLORIDE 9 MG/ML
INJECTION, SOLUTION INTRAVENOUS CONTINUOUS
Status: CANCELLED | OUTPATIENT
Start: 2024-04-02

## 2024-04-02 RX ORDER — ONDANSETRON 4 MG/1
4 TABLET, ORALLY DISINTEGRATING ORAL 2 TIMES DAILY
Qty: 10 TABLET | Refills: 0 | Status: SHIPPED | OUTPATIENT
Start: 2024-04-02

## 2024-04-04 ENCOUNTER — TELEPHONE (OUTPATIENT)
Dept: ORTHOPEDICS | Facility: CLINIC | Age: 54
End: 2024-04-04
Payer: COMMERCIAL

## 2024-04-04 NOTE — TELEPHONE ENCOUNTER
Spoke c pt. Informed pt of 10 a.m. arrival time for 04/05/24 surgery at the [facility]. Reminded pt of NPO status & PO appt. Pt expressed understanding & was thankful.

## 2024-04-05 ENCOUNTER — ANESTHESIA (OUTPATIENT)
Dept: SURGERY | Facility: HOSPITAL | Age: 54
End: 2024-04-05
Payer: COMMERCIAL

## 2024-04-05 ENCOUNTER — HOSPITAL ENCOUNTER (OUTPATIENT)
Facility: HOSPITAL | Age: 54
Discharge: HOME | End: 2024-04-05
Attending: ORTHOPAEDIC SURGERY | Admitting: ORTHOPAEDIC SURGERY
Payer: COMMERCIAL

## 2024-04-05 VITALS
BODY MASS INDEX: 20.33 KG/M2 | TEMPERATURE: 98 F | OXYGEN SATURATION: 100 % | RESPIRATION RATE: 20 BRPM | HEART RATE: 44 BPM | DIASTOLIC BLOOD PRESSURE: 82 MMHG | HEIGHT: 70 IN | SYSTOLIC BLOOD PRESSURE: 129 MMHG | WEIGHT: 142 LBS

## 2024-04-05 DIAGNOSIS — S63.641A RUPTURE OF UCL OF RIGHT THUMB: ICD-10-CM

## 2024-04-05 PROCEDURE — 99900035 HC TECH TIME PER 15 MIN (STAT)

## 2024-04-05 PROCEDURE — 63600175 PHARM REV CODE 636 W HCPCS: Mod: JZ,JG | Performed by: ANESTHESIOLOGY

## 2024-04-05 PROCEDURE — D9220A PRA ANESTHESIA: Mod: CRNA,,, | Performed by: NURSE ANESTHETIST, CERTIFIED REGISTERED

## 2024-04-05 PROCEDURE — 63600175 PHARM REV CODE 636 W HCPCS: Performed by: NURSE ANESTHETIST, CERTIFIED REGISTERED

## 2024-04-05 PROCEDURE — 25000003 PHARM REV CODE 250: Performed by: STUDENT IN AN ORGANIZED HEALTH CARE EDUCATION/TRAINING PROGRAM

## 2024-04-05 PROCEDURE — 36000709 HC OR TIME LEV III EA ADD 15 MIN: Performed by: ORTHOPAEDIC SURGERY

## 2024-04-05 PROCEDURE — 25000003 PHARM REV CODE 250: Performed by: ORTHOPAEDIC SURGERY

## 2024-04-05 PROCEDURE — 25000003 PHARM REV CODE 250: Performed by: PHYSICIAN ASSISTANT

## 2024-04-05 PROCEDURE — 26540 REPAIR HAND JOINT: CPT | Mod: F5,,, | Performed by: ORTHOPAEDIC SURGERY

## 2024-04-05 PROCEDURE — 27201423 OPTIME MED/SURG SUP & DEVICES STERILE SUPPLY: Performed by: ORTHOPAEDIC SURGERY

## 2024-04-05 PROCEDURE — 37000008 HC ANESTHESIA 1ST 15 MINUTES: Performed by: ORTHOPAEDIC SURGERY

## 2024-04-05 PROCEDURE — 36000708 HC OR TIME LEV III 1ST 15 MIN: Performed by: ORTHOPAEDIC SURGERY

## 2024-04-05 PROCEDURE — 71000033 HC RECOVERY, INTIAL HOUR: Performed by: ORTHOPAEDIC SURGERY

## 2024-04-05 PROCEDURE — 63600175 PHARM REV CODE 636 W HCPCS: Performed by: PHYSICIAN ASSISTANT

## 2024-04-05 PROCEDURE — 25000003 PHARM REV CODE 250: Performed by: NURSE ANESTHETIST, CERTIFIED REGISTERED

## 2024-04-05 PROCEDURE — 71000015 HC POSTOP RECOV 1ST HR: Performed by: ORTHOPAEDIC SURGERY

## 2024-04-05 PROCEDURE — D9220A PRA ANESTHESIA: Mod: ANES,,, | Performed by: ANESTHESIOLOGY

## 2024-04-05 PROCEDURE — C1713 ANCHOR/SCREW BN/BN,TIS/BN: HCPCS | Performed by: ORTHOPAEDIC SURGERY

## 2024-04-05 PROCEDURE — 94761 N-INVAS EAR/PLS OXIMETRY MLT: CPT

## 2024-04-05 PROCEDURE — 64415 NJX AA&/STRD BRCH PLXS IMG: CPT | Mod: 59,RT | Performed by: ANESTHESIOLOGY

## 2024-04-05 PROCEDURE — 37000009 HC ANESTHESIA EA ADD 15 MINS: Performed by: ORTHOPAEDIC SURGERY

## 2024-04-05 DEVICE — H/W INTERNALBRACE LGMNT AUGMNT REPR KIT
Type: IMPLANTABLE DEVICE | Site: THUMB | Status: FUNCTIONAL
Brand: ARTHREX®

## 2024-04-05 RX ORDER — MUPIROCIN 20 MG/G
OINTMENT TOPICAL
Status: DISCONTINUED | OUTPATIENT
Start: 2024-04-05 | End: 2024-04-05 | Stop reason: HOSPADM

## 2024-04-05 RX ORDER — ONDANSETRON HYDROCHLORIDE 2 MG/ML
4 INJECTION, SOLUTION INTRAVENOUS EVERY 12 HOURS PRN
Status: DISCONTINUED | OUTPATIENT
Start: 2024-04-05 | End: 2024-04-05 | Stop reason: HOSPADM

## 2024-04-05 RX ORDER — ACETAMINOPHEN 325 MG/1
650 TABLET ORAL EVERY 4 HOURS PRN
Status: DISCONTINUED | OUTPATIENT
Start: 2024-04-05 | End: 2024-04-05 | Stop reason: HOSPADM

## 2024-04-05 RX ORDER — HYDROMORPHONE HYDROCHLORIDE 1 MG/ML
0.2 INJECTION, SOLUTION INTRAMUSCULAR; INTRAVENOUS; SUBCUTANEOUS EVERY 5 MIN PRN
Status: DISCONTINUED | OUTPATIENT
Start: 2024-04-05 | End: 2024-04-05 | Stop reason: HOSPADM

## 2024-04-05 RX ORDER — SODIUM CHLORIDE 9 MG/ML
INJECTION, SOLUTION INTRAVENOUS CONTINUOUS
Status: DISCONTINUED | OUTPATIENT
Start: 2024-04-05 | End: 2024-04-05 | Stop reason: HOSPADM

## 2024-04-05 RX ORDER — DEXAMETHASONE SODIUM PHOSPHATE 4 MG/ML
INJECTION, SOLUTION INTRA-ARTICULAR; INTRALESIONAL; INTRAMUSCULAR; INTRAVENOUS; SOFT TISSUE
Status: DISCONTINUED | OUTPATIENT
Start: 2024-04-05 | End: 2024-04-05

## 2024-04-05 RX ORDER — ONDANSETRON HYDROCHLORIDE 2 MG/ML
INJECTION, SOLUTION INTRAVENOUS
Status: DISCONTINUED | OUTPATIENT
Start: 2024-04-05 | End: 2024-04-05

## 2024-04-05 RX ORDER — HYDROCODONE BITARTRATE AND ACETAMINOPHEN 10; 325 MG/1; MG/1
1 TABLET ORAL EVERY 4 HOURS PRN
Status: DISCONTINUED | OUTPATIENT
Start: 2024-04-05 | End: 2024-04-05 | Stop reason: HOSPADM

## 2024-04-05 RX ORDER — HALOPERIDOL 5 MG/ML
0.5 INJECTION INTRAMUSCULAR EVERY 10 MIN PRN
Status: DISCONTINUED | OUTPATIENT
Start: 2024-04-05 | End: 2024-04-05 | Stop reason: HOSPADM

## 2024-04-05 RX ORDER — BACITRACIN ZINC 500 UNIT/G
OINTMENT (GRAM) TOPICAL
Status: DISCONTINUED | OUTPATIENT
Start: 2024-04-05 | End: 2024-04-05 | Stop reason: HOSPADM

## 2024-04-05 RX ORDER — FENTANYL CITRATE 50 UG/ML
100 INJECTION, SOLUTION INTRAMUSCULAR; INTRAVENOUS
Status: DISCONTINUED | OUTPATIENT
Start: 2024-04-05 | End: 2024-04-05 | Stop reason: HOSPADM

## 2024-04-05 RX ORDER — HYDROCODONE BITARTRATE AND ACETAMINOPHEN 5; 325 MG/1; MG/1
1 TABLET ORAL EVERY 4 HOURS PRN
Status: DISCONTINUED | OUTPATIENT
Start: 2024-04-05 | End: 2024-04-05 | Stop reason: HOSPADM

## 2024-04-05 RX ORDER — METOCLOPRAMIDE HYDROCHLORIDE 5 MG/ML
10 INJECTION INTRAMUSCULAR; INTRAVENOUS EVERY 10 MIN PRN
Status: DISCONTINUED | OUTPATIENT
Start: 2024-04-05 | End: 2024-04-05 | Stop reason: HOSPADM

## 2024-04-05 RX ORDER — OXYCODONE HYDROCHLORIDE 5 MG/1
5 TABLET ORAL
Status: DISCONTINUED | OUTPATIENT
Start: 2024-04-05 | End: 2024-04-05 | Stop reason: HOSPADM

## 2024-04-05 RX ORDER — MIDAZOLAM HYDROCHLORIDE 1 MG/ML
1 INJECTION, SOLUTION INTRAMUSCULAR; INTRAVENOUS
Status: DISCONTINUED | OUTPATIENT
Start: 2024-04-05 | End: 2024-04-05 | Stop reason: HOSPADM

## 2024-04-05 RX ORDER — PROPOFOL 10 MG/ML
VIAL (ML) INTRAVENOUS CONTINUOUS PRN
Status: DISCONTINUED | OUTPATIENT
Start: 2024-04-05 | End: 2024-04-05

## 2024-04-05 RX ORDER — DEXMEDETOMIDINE HYDROCHLORIDE 100 UG/ML
INJECTION, SOLUTION INTRAVENOUS
Status: DISCONTINUED | OUTPATIENT
Start: 2024-04-05 | End: 2024-04-05

## 2024-04-05 RX ORDER — MUPIROCIN 20 MG/G
OINTMENT TOPICAL 2 TIMES DAILY
Status: DISCONTINUED | OUTPATIENT
Start: 2024-04-05 | End: 2024-04-05 | Stop reason: HOSPADM

## 2024-04-05 RX ORDER — FAMOTIDINE 10 MG/ML
INJECTION INTRAVENOUS
Status: DISCONTINUED | OUTPATIENT
Start: 2024-04-05 | End: 2024-04-05

## 2024-04-05 RX ORDER — METOCLOPRAMIDE HYDROCHLORIDE 5 MG/ML
5 INJECTION INTRAMUSCULAR; INTRAVENOUS EVERY 6 HOURS PRN
Status: DISCONTINUED | OUTPATIENT
Start: 2024-04-05 | End: 2024-04-05 | Stop reason: HOSPADM

## 2024-04-05 RX ORDER — BUPIVACAINE HYDROCHLORIDE 5 MG/ML
INJECTION, SOLUTION EPIDURAL; INTRACAUDAL
Status: COMPLETED
Start: 2024-04-05 | End: 2024-04-05

## 2024-04-05 RX ORDER — ACETAMINOPHEN 500 MG
1000 TABLET ORAL
Status: COMPLETED | OUTPATIENT
Start: 2024-04-05 | End: 2024-04-05

## 2024-04-05 RX ORDER — SODIUM CHLORIDE 0.9 % (FLUSH) 0.9 %
10 SYRINGE (ML) INJECTION
Status: DISCONTINUED | OUTPATIENT
Start: 2024-04-05 | End: 2024-04-05 | Stop reason: HOSPADM

## 2024-04-05 RX ORDER — BUPIVACAINE HYDROCHLORIDE 5 MG/ML
INJECTION, SOLUTION EPIDURAL; INTRACAUDAL
Status: COMPLETED | OUTPATIENT
Start: 2024-04-05 | End: 2024-04-05

## 2024-04-05 RX ORDER — LIDOCAINE HYDROCHLORIDE 20 MG/ML
INJECTION INTRAVENOUS
Status: DISCONTINUED | OUTPATIENT
Start: 2024-04-05 | End: 2024-04-05

## 2024-04-05 RX ORDER — CEFAZOLIN 2 G/1
INJECTION, POWDER, FOR SOLUTION INTRAMUSCULAR; INTRAVENOUS
Status: DISCONTINUED | OUTPATIENT
Start: 2024-04-05 | End: 2024-04-05

## 2024-04-05 RX ADMIN — CEFAZOLIN 2 G: 2 INJECTION, POWDER, FOR SOLUTION INTRAMUSCULAR; INTRAVENOUS at 12:04

## 2024-04-05 RX ADMIN — PROPOFOL 100 MCG/KG/MIN: 10 INJECTION, EMULSION INTRAVENOUS at 12:04

## 2024-04-05 RX ADMIN — FAMOTIDINE 20 MG: 10 INJECTION, SOLUTION INTRAVENOUS at 12:04

## 2024-04-05 RX ADMIN — MIDAZOLAM HYDROCHLORIDE 2 MG: 1 INJECTION, SOLUTION INTRAMUSCULAR; INTRAVENOUS at 11:04

## 2024-04-05 RX ADMIN — SODIUM CHLORIDE: 0.9 INJECTION, SOLUTION INTRAVENOUS at 12:04

## 2024-04-05 RX ADMIN — FENTANYL CITRATE 100 MCG: 50 INJECTION INTRAMUSCULAR; INTRAVENOUS at 11:04

## 2024-04-05 RX ADMIN — DEXAMETHASONE SODIUM PHOSPHATE 4 MG: 4 INJECTION, SOLUTION INTRAMUSCULAR; INTRAVENOUS at 12:04

## 2024-04-05 RX ADMIN — DEXMEDETOMIDINE 12 MCG: 100 INJECTION, SOLUTION, CONCENTRATE INTRAVENOUS at 12:04

## 2024-04-05 RX ADMIN — ONDANSETRON 4 MG: 2 INJECTION INTRAMUSCULAR; INTRAVENOUS at 12:04

## 2024-04-05 RX ADMIN — ACETAMINOPHEN 1000 MG: 500 TABLET ORAL at 10:04

## 2024-04-05 RX ADMIN — LIDOCAINE HYDROCHLORIDE 100 MG: 20 INJECTION INTRAVENOUS at 12:04

## 2024-04-05 RX ADMIN — SODIUM CHLORIDE: 0.9 INJECTION, SOLUTION INTRAVENOUS at 10:04

## 2024-04-05 RX ADMIN — BUPIVACAINE HYDROCHLORIDE 20 ML: 5 INJECTION, SOLUTION EPIDURAL; INTRACAUDAL; PERINEURAL at 11:04

## 2024-04-05 RX ADMIN — MUPIROCIN: 20 OINTMENT TOPICAL at 10:04

## 2024-04-05 NOTE — PLAN OF CARE
Preop complete. Pt resting comfortably ready for nerve block. Side rails up, bed in lowest position.

## 2024-04-05 NOTE — TRANSFER OF CARE
"Anesthesia Transfer of Care Note    Patient: Jessica Nelson MD    Procedure(s) Performed: Procedure(s) (LRB):  RIGHT THUMB UCL REPAIR (Right)    Patient location: PACU    Anesthesia Type: MAC and regional    Transport from OR: Transported from OR on 6-10 L/min O2 by face mask with adequate spontaneous ventilation    Post pain: adequate analgesia    Post assessment: no apparent anesthetic complications and tolerated procedure well    Post vital signs: stable    Level of consciousness: sedated    Nausea/Vomiting: no nausea/vomiting    Complications: none    Transfer of care protocol was followed      Last vitals: Visit Vitals  /70   Pulse (!) 44   Temp 36.3 °C (97.4 °F) (Temporal)   Resp 14   Ht 5' 10" (1.778 m)   Wt 64.4 kg (142 lb)   LMP 11/04/2019   SpO2 100%   Breastfeeding No   BMI 20.37 kg/m²     "

## 2024-04-05 NOTE — ANESTHESIA PREPROCEDURE EVALUATION
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 Possibly secondary to CMV    Benign essential HTN 7/29/2015    Bilateral retinal lattice degeneration     Depression 11/29/2012    Essential hypertension 7/29/2015    Nodular fasciitis 2005    LUE    Subclinical hypothyroidism 2012    Venous insufficiency      Patient Active Problem List   Diagnosis    Bilateral retinal lattice degeneration    Family history of breast cancer    Venous insufficiency of both lower extremities    Hypothyroidism - started synthroid 25 mcg 12/2021    CLARISSA (generalized anxiety disorder)     Review of patient's allergies indicates:   Allergen Reactions    Lisinopril Other (See Comments)     cough    Morphine      Other reaction(s): Vomiting  Other reaction(s): Nausea       No current facility-administered medications on file prior to encounter.     Current Outpatient Medications on File Prior to Encounter   Medication Sig Dispense Refill    calcium-vitamin D 500-125 mg-unit tablet Take 1 tablet by mouth once daily.      carvediloL (COREG) 3.125 MG tablet Take 1 tablet (3.125 mg total) by mouth 2 (two) times daily with meals. Due for annual with PCP, labs 180 tablet 3    EScitalopram oxalate (LEXAPRO) 10 MG tablet Take 1 tablet (10 mg total) by mouth once daily. 90 tablet 3    levothyroxine (SYNTHROID) 75 MCG tablet Take 1 tablet (75 mcg total) by mouth before breakfast. 90 tablet 3       Past Surgical History:   Procedure Laterality Date    COLONOSCOPY N/A 12/17/2020    Procedure: COLONOSCOPY;  Surgeon: Ida Donaldson MD;  Location: [address]);  Service: Endoscopy;  Laterality: N/A;  Per JOVANY Sorto, Pt having constipation and LLQ abdominal pain,     Patient:   ROBERT PEREZ            MRN: [id number]            FIN: [id number]               Age:   56 years     Sex:  FEMALE     :  65   Associated Diagnoses:   None   Author:   NITA EASTMAN      Pre-Procedure   Procedure Date:  2017 .    Procedure Type:  Colonoscopy.     Procedure provider:  Performed by NITA EASTMAN.    Referred by:  ESTEFANY LEWIS.    Current History and Physical:  Documented on chart, Reviewed, Performed prior to procedure.    Family History:     MOTHER  CA - Cancer of colon: negative  FATHER  CA - Cancer of colon: negative  GRANDMOTHER  CA - Cancer of colon: negative  GRANDFATHER  CA - Cancer of colon: negative  SISTER  CA - Cancer of colon: negative  BROTHER  CA - Cancer of colon: negative  .    Procedure History:     colonoscopy..    Informed Consent:  After discussing the rationale, risks and benefits, and alternatives to this procedure, the patient provided signed consent for the procedure, After discussing the rationale, risks, and benefits, and alternatives to this procedure, the patient provided signed consent for this procedure. The procedure was explained to the patient and/or legal guardian including polyp miss rate and its potential complication ie, bleeding, perforation, possible need for blood transfusion and surgery. .    Preoperative Diagnosis / Indication     Colon cancer screening: age 50 years or over.     Colorectal Neoplasm Risk Assessment:  Average risk.    Medications:   (Selected)   .    ASA Classification:  Class II.    Monitoring:  See anesthesia record.       Procedure   The procedure was performed in the ambulatory surgery center.  Moderate sedation given during the procedure was midazolam 4 mg and fentanyl 100 mcg.  Rectal exam was performed and was normal with no masses palpated, with no gross blood.  The patient was positioned starting in the left lateral decubitus position.  Endoscope type used was a pediatric-size,  "diagnostic colonoscopy- ERW11/19/20@0934  COVID [facility] 12/14/20 [facility]    Maxillofacial Surgery  1986    Malocclusion    Nodule Excision  2005    Benign Nodular Fasciitis LUE    RETINAL LASER PROCEDURE  2007    Lattice Degeneration Retina    TONSILLECTOMY         CBC: No results for input(s): "WBC", "HGB", "HCT", "PLT" in the last 72 hours.    CMP: No results for input(s): "NA", "K", "CL", "CO2", "BUN", "CREATININE", "GLU", "MG", "PHOS", "CALCIUM", "ALBUMIN", "PROT", "ALKPHOS", "ALT", "AST", "BILITOT" in the last 72 hours.    COAGS  No results for input(s): "PT", "INR", "PROTIME", "APTT" in the last 72 hours.    BP Readings from Last 3 Encounters:   05/04/23 119/71   12/20/22 110/68   04/07/22 122/82       Diagnostic Studies:      EKG:  None recent    2D Echo:  No results found for this or any previous visit.        Pre-op Assessment    I have reviewed the Patient Summary Reports.     I have reviewed the Nursing Notes. I have reviewed the NPO Status.      Review of Systems  Cardiovascular:     Hypertension       Denies Angina.       Denies LAW.                            Pulmonary:  Pulmonary Normal   Denies COPD.  Denies Asthma.     Denies Sleep Apnea.                Renal/:  Renal/ Normal                 Hepatic/GI:  Hepatic/GI Normal     Denies GERD.             Musculoskeletal:  Musculoskeletal Normal                Neurological:  Neurology Normal   Denies CVA.                                    Endocrine:   Hypothyroidism          Psych:   anxiety depression                Physical Exam  General: Well nourished, Cooperative, Alert and Oriented    Airway:  Mallampati: II   Mouth Opening: Normal  TM Distance: Normal  Tongue: Normal  Neck ROM: Normal ROM    Dental:  Intact        Anesthesia Plan  Type of Anesthesia, risks & benefits discussed:    Anesthesia Type: Gen Natural Airway, Regional  Intra-op Monitoring Plan: Standard ASA Monitors  Post Op Pain Control Plan: multimodal analgesia and " colonoscope, Olympus.  The endoscope was lubricated then introduced through the anus.  The scope was advanced to the terminal ileum over a period of 7 minutes, with the ileum intubated 10 cm, with photodocumentation of the terminal ileum, with photodocumentation of the ileocecal valve, with photodocumentation of the appendiceal orifice.  No difficulties encountered during the procedure.  The bowel was carefully examined as the scope was withdrawn 10 minutes after visualizing the cecum.  Rectal retroflexion was performed.  Bowel preparation quality was inadequate, moderate quantities of vegetable matter and seeds scattered across the coln and would cause repeated plugging of the sucrtion channel and could not be washed off and cleared adequately for a thorough exam despite a lot of lavaging, was inadequate for the detection of polyps larger than 5 mm, right colon BBPS was 2 - seen well; minor staining stool, middle colon BBPS was 2 - seen well; minor staining stool, left colon BBPS was 1 - mostly seen well; moderate staining stool and total [place] Bowel Preparation Scale was 7 /9.  The patient tolerated the procedure well.     Findings   No gross lesions noted; small lesions cannot be excluded   Internal hemorrhoids were identified.  The severity is described as moderate.     Images   Procedure images:         1_2017_12_[id number].jpg   1_2017_12_[id number].jpg   1_2017_12_[id number].jpg     1_2017_12_08T08_05_[id number]_hd.jpg   1_2017_12_[id number]_hd.jpg   1_2017_12_[id number].jpg     [id number]_12_[id number]_hd.jpg   .       Post-Procedure   Complications during procedure:  None.    Estimated blood loss:  None.    Blood administered:  No.    Specimens:  To pathology.    Grafts/implants:  None.    Assistants:  None.       Impression and Plan   Colonoscopy:       Diagnosis: K 64.8 Hemorrhoids.    Recommendations     Repeat colonoscopy in 1 year.     With a two day prep; avoid  IV/PO Opioids PRN  Induction:  IV  Informed Consent: Informed consent signed with the Patient and all parties understand the risks and agree with anesthesia plan.  All questions answered.   ASA Score: 2  Day of Surgery Review of History & Physical: H&P Update referred to the surgeon/provider.    Ready For Surgery From Anesthesia Perspective.     .       vegetables for two days prior to procedure.     Follow up with your primary physician as usual.     Maintain a diet that is high in fiber.     Continue current medications.     Return to normal activities after 24 hours.     You received medications for sedation during this procedure: fentanyl, midazolam.     Education and Follow-up:       Counseled: Patient, Family.             Electronically Signed On 12/08/2017 08:18  __________________________________________________   NITA EASTMAN    CHECK ONBASE FOR ATTACHMENT: GI Procedure Note    CHECK ONBASE FOR ATTACHMENT: GI Procedure Note    CHECK ONBASE FOR ATTACHMENT: GI Procedure Note    CHECK ONBASE FOR ATTACHMENT: GI Procedure Note    CHECK ONBASE FOR ATTACHMENT: GI Procedure Note    CHECK ONBASE FOR ATTACHMENT: GI Procedure Note    CHECK ONBASE FOR ATTACHMENT: GI Procedure Note

## 2024-04-05 NOTE — PLAN OF CARE
Discharge instructions reviewed and pt verbalizes understanding. VSS and afebrile. Pain control appropriate.Dressing clean, dry and intact. Sling in place. AVS complete. Meds delivered at bedside.

## 2024-04-05 NOTE — ANESTHESIA PROCEDURE NOTES
Peripheral Block    Patient location during procedure: pre-op   Block not for primary anesthetic.  Reason for block: at surgeon's request and post-op pain management   Post-op Pain Location: right hand   Start time: 4/5/2024 11:10 AM  Timeout: 4/5/2024 11:10 AM   End time: 4/5/2024 11:15 AM    Staffing  Authorizing Provider: Darrick Paul III, MD  Performing Provider: Darrick Paul III, MD    Staffing  Performed by: Darrick Paul III, MD  Authorized by: Darrick Paul III, MD    Preanesthetic Checklist  Completed: patient identified, IV checked, site marked, risks and benefits discussed, surgical consent, monitors and equipment checked, pre-op evaluation and timeout performed  Peripheral Block  Patient position: supine  Prep: ChloraPrep  Patient monitoring: heart rate, cardiac monitor, continuous pulse ox, continuous capnometry and frequent blood pressure checks  Block type: supraclavicular  Laterality: right  Injection technique: single shot  Needle  Needle type: Stimuplex   Needle gauge: 22 G  Needle length: 2 in  Needle localization: anatomical landmarks and ultrasound guidance   -ultrasound image captured on disc.  Assessment  Injection assessment: negative aspiration, negative parasthesia and local visualized surrounding nerve  Paresthesia pain: none  Heart rate change: no  Slow fractionated injection: yes    Medications:    Medications: bupivacaine (pf) (MARCAINE) injection 0.5% - Perineural   20 mL - 4/5/2024 11:15:00 AM    Additional Notes  VSS.  [facility] RN monitoring vitals throughout procedure.  Patient tolerated procedure well.

## 2024-04-05 NOTE — ANESTHESIA POSTPROCEDURE EVALUATION
Anesthesia Post Evaluation    Patient: Jessica Nelson MD    Procedure(s) Performed: Procedure(s) (LRB):  RIGHT THUMB UCL REPAIR (Right)    Final Anesthesia Type: general      Patient location during evaluation: PACU  Patient participation: Yes- Able to Participate  Level of consciousness: awake and alert and oriented  Post-procedure vital signs: reviewed and stable  Pain management: adequate  Airway patency: patent    PONV status at discharge: No PONV  Anesthetic complications: no      Cardiovascular status: hemodynamically stable  Respiratory status: nasal cannula  Hydration status: euvolemic  Follow-up not needed.          Vitals Value Taken Time   /82 04/05/24 1446   Temp 36.3 °C (97.4 °F) 04/05/24 1406   Pulse 42 04/05/24 1451   Resp 20 04/05/24 1451   SpO2 100 % 04/05/24 1451   Vitals shown include unvalidated device data.      Event Time   Out of Recovery 14:44:00         Pain/Aris Score: Pain Rating Prior to Med Admin: 0 (4/5/2024 11:09 AM)  Pain Rating Post Med Admin: 0 (4/5/2024 12:15 PM)  Aris Score: 9 (4/5/2024  2:15 PM)

## 2024-04-05 NOTE — BRIEF OP NOTE
[facility] - Surgery (Hospital)  Brief Operative Note    Surgery Date: 4/5/2024     Surgeon(s) and Role:     * Megan Staples MD - Primary    Assisting Surgeon: None    Pre-op Diagnosis:  Pain of right thumb [M79.644]    Post-op Diagnosis:  Post-Op Diagnosis Codes:     * Pain of right thumb [M79.644]    Procedure(s) (LRB):  RIGHT THUMB UCL REPAIR (Right)    Anesthesia: Regional    Operative Findings: see op note    Estimated Blood Loss: * No values recorded between 4/5/2024 12:51 PM and 4/5/2024  2:07 PM *         Specimens:   Specimen (24h ago, onward)      None              Discharge Note    OUTCOME: Patient tolerated treatment/procedure well without complication and is now ready for discharge.    DISPOSITION: Home or Self Care    FINAL DIAGNOSIS:  Rupture of UCL of right thumb    FOLLOWUP: In clinic    DISCHARGE INSTRUCTIONS:    Discharge Procedure Orders   Diet general     Sponge bath only until clinic visit     Keep surgical extremity elevated     Lifting restrictions   Order Comments: No lifting >5lbs     No driving, operating heavy equipment or signing legal documents while taking pain medication.     Leave dressing on - Keep it clean, dry, and intact until clinic visit     Call MD for:  temperature >100.4     Call MD for:  persistent nausea and vomiting     Call MD for:  severe uncontrolled pain     Call MD for:  difficulty breathing, headache or visual disturbances     Call MD for:  redness, tenderness, or signs of infection (pain, swelling, redness, odor or green/yellow discharge around incision site)     Call MD for:  hives     Call MD for:  persistent dizziness or light-headedness     Call MD for:  extreme fatigue     Shower on day dressing removed (No bath)

## 2024-04-05 NOTE — H&P
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           Chief Complaint   No chief complaint on file.        Patient presents for evaluation of an injury to her right hand.      3 weeks ago ( feb 22nd)- fell while holding her water bottle, felt her right thumb dislocate and she put it back in place. She has been wearing a braces, it was swollen and bruised- painful and weak. Piinching and     Int hx 4/5/24: Ready for OR.           Past Medical History:   Diagnosis Date    Alopecia areata 1990     Resolved. Possibly secondary to CMV    Benign essential HTN 7/29/2015    Bilateral retinal lattice degeneration      Depression 11/29/2012    Essential hypertension 7/29/2015    Nodular fasciitis 2005     LUE    Subclinical hypothyroidism 2012    Venous insufficiency              Family History   Problem Relation Age of Onset    Hypertension Mother      Hyperlipidemia Mother      Breast cancer Mother 67    Hypertension Father      Hyperlipidemia Father      Ovarian cancer Paternal Aunt      Thyroid disease Neg Hx      Amblyopia Neg Hx      Blindness Neg Hx      Cancer Neg Hx      Cataracts Neg Hx      Diabetes Neg Hx      Glaucoma Neg Hx      Macular degeneration Neg Hx      Retinal detachment Neg Hx      Strabismus Neg Hx      Stroke Neg Hx        Current Medications          Current Outpatient Medications   Medication Sig Dispense Refill    calcium-vitamin D 500-125 mg-unit tablet Take 1 tablet by mouth once daily.        carvediloL (COREG) 3.125 MG tablet Take 1 tablet (3.125 mg total) by mouth 2 (two) times daily with meals. Due for annual with PCP, labs 180 tablet 3    EScitalopram oxalate (LEXAPRO) 10 MG tablet Take 1 tablet (10 mg total) by mouth once daily. 90 tablet 3    levothyroxine (SYNTHROID) 75 MCG tablet Take 1 tablet (75 mcg total) by mouth before breakfast. 90 tablet 3                Current Facility-Administered Medications   Medication Dose Route  Frequency Provider Last Rate Last Admin    lidocaine HCL 10 mg/ml (1%) injection 1 mL  1 mL Other 1 time in Clinic/[facility] [place], MD                   Review of patient's allergies indicates:   Allergen Reactions    Lisinopril Other (See Comments)       cough    Morphine         Other reaction(s): Vomiting  Other reaction(s): Nausea      Social History               Socioeconomic History    Marital status:     Number of children: 2    Years of education: MD   Occupational History    Occupation: General Internist       Employer: [employer]   Tobacco Use    Smoking status: Never    Smokeless tobacco: Never   Substance and Sexual Activity    Alcohol use: Yes       Comment: Rare    Drug use: No    Sexual activity: Yes       Partners: Male       Birth control/protection: Partner-Vasectomy         Review of Systems   Pertinent items are noted in HPI.      Physical Exam      Vitals please see computer entry General alert orient x3 well-developed well-nourished no apparent distress from the individual well groomed appears stated age     Skin clean dry and intact no bruising minimal swelling      Bilateral upper extremity median radial ulnar anterior interosseous she has posterior interosseous motor and sensation to light touch intact brisk cap refill full range of motion of fingers and wrist she is tender palpation over the UCL of the thumb right hand she has significant stability instability at 30° and 0° especially it has a 15 degree change from the contralateral side x-rays no fracture plan at this time there is a high suspicion she is got the UCL tear especially because it is very swollen in that area     MRI indicative of thumb MCPJ UCL tear. Recommended surgical repair. Patient agreed. Informed and written consent was obtained in clinic.

## 2024-04-05 NOTE — OP NOTE
Orthopedic Surgery Operative Note     Date of Procedure: 4/5/2024     Procedure:  Right thumb ulnar collateral ligament at the metacarpophalangeal joint repair    Surgeons:  Surgeon(s) and Role:     * Megan Staples MD - Primary    Assisting Surgeon: None    Pre-Operative Diagnosis:  Tear of the ulnar collateral ligament of the right thumb at the metacarpophalangeal joint with associated Stener lesion    Post-Operative Diagnosis:   Same    Anesthesia: Regional    Findings of the Procedure: Improved alignment with stable fixation    Complications: No    Tourniquet time: 67 mins    Estimated Blood Loss (EBL): minimal           Implants:     Implant Name Type Inv. Item Serial No.  Lot No. LRB No. Used Action   KIT INTERNALBRACE HAND/WRIST - [id number]  KIT INTERNALBRACE HAND/WRIST  [id number] Right 1 Implanted   BIOBRACE     [id number] Right 1 Implanted       Specimens: None            Condition: Good    Disposition: PACU - hemodynamically stable.    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  Attempted immobilization without improvement and subsequently obtained MRI.  If it was indicative of a UCL tear and associated Stener lesion.  At this point recommended operative intervention including UCL repair.  We discussed the risks and benefits of surgical intervention including but not limited to pain, infection, bleeding, damage to surrounding structures, iatrogenic fracture, hardware failure, need for further interventions. They wish to proceed with surgery.  Informed and written consent was obtained prior to proceeding.    Procedure in Detail:  After the correct site was marked with the patient's participation in the holding area, the patient underwent regional anesthesia. They were brought to the Operating room and placed in the supine position. A  well-padded nonsterile tourniquet was placed on the upper extremity. The upper extremity was prepped and draped in a normal sterile fashion. Formal timeout was performed confirming the correct patient, site, and procedure. IV antibiotics were given to the patient preoperatively.  A longitudinal incision was marked out over the ulnar aspect of the MCP J. the extremity was exsanguinated with an Esmarch and the tourniquet was inflated to 250 mm of mercury.  Incision was made.  Careful dissection was performed with a Littler to the MCP joint.  The dorsal digital sensory nerve was identified and protected during the entirety of the case over the radial aspect of the incision.  Of note, you could see that the ulnar collateral ligament was completely ripped off and balled up and had penetrated the adductor aponeurosis, consistent with a Stener lesion.  Therefore, the decision was made to repair it.  A guidewire was placed at the base of P1 and the distal aspect of the metacarpal and checked under orthogonal fluoroscopic views for appropriate placement of our anchors.  On the back table we prepared the Acumed biobrace by splitting it longitudinally in half to make it more appropriately sized for this joint and passed 2 looped FiberWire sutures around the ends for manipulation.  Starting distally, the anchor was drilled appropriately and then fixed 1 limb of the biobrace with an Arthrex corkscrew Agueda anchor.  We then used FiberWire that was attached to the anchor to repair the collateral ligament with the MCP joint in 30° of flexion.  Again while holding the MCP joint in 30° of flexion, the proximal anchor was drilled appropriately, but the size of the biobrace would not fit into the drill hole with the sized anchor.  At this point the decision was made to not size up the anchor to accommodate this, but instead placed the planned size Arthrex corkscrew Agueda anchor with FiberWire attached.  Then used the FiberWire to suture  the brace over the top with excellent tension and fixation.  We then repaired the aponeurosis with FiberWire.  Again checked fixation and this was excellent to valgus stress in 30° flexion.  In a similar fashion the radial collateral ligament was assessed and appeared to be stable.  The wound was irrigated with copious amounts of normal saline.  The wound was then closed with 4-0 Vicryl, 4-0 Monocryl, and Dermabond.  Sterile dressing was placed with Adaptic with bacitracin, 4x4s, cast padding, and a thumb spica splint in a coke can position with Ace wrap was applied.  Tourniquet was deflated.  Brisk capillary refill ensued.The patient tolerated the procedure well and was brought to Recovery Area in stable condition.       Postoperative Plan:  Keep the dressing clean, dry, and intact. Patient will follow-up in two weeks time wound check.  We will place into a cast for 2 more weeks and then started therapy with standard precautions.     Scheduled f/us: 2 weeks, 6 weeks, 3 months     Xrays at subsequent f/us: none

## 2024-04-11 DIAGNOSIS — Z00.00 PREVENTATIVE HEALTH CARE: Primary | ICD-10-CM

## 2024-04-11 DIAGNOSIS — E03.9 HYPOTHYROIDISM, UNSPECIFIED TYPE: ICD-10-CM

## 2024-04-12 RX ORDER — LEVOTHYROXINE SODIUM 75 UG/1
75 TABLET ORAL
Qty: 90 TABLET | Refills: 3 | Status: SHIPPED | OUTPATIENT
Start: 2024-04-12

## 2024-04-12 NOTE — TELEPHONE ENCOUNTER
Refill Routing Note   Medication(s) are not appropriate for processing by [facility] for the following reason(s):        Required labs outdated: TSH    [facility] action(s):  Defer     Requires labs : Yes    Medication Therapy Plan:    Pended labs utilizing BestPracticeAdvisor (BPA) tab due to extra training from LPN      Appointments  past 12m or future 3m with PCP    Date Provider   Last Visit   5/4/2023 Angela Gerardo MD   Next Visit   Visit date not found Angela Gerardo MD   ED visits in past 90 days: 0        Note composed:5:20 PM 04/12/2024

## 2024-04-12 NOTE — TELEPHONE ENCOUNTER
Care Due:                  Date            Visit Type   Department     Provider  --------------------------------------------------------------------------------                                EP -                              PRIMARY      [facility]  Last Visit: 05-      CARE ([facility])   Care           Angela Gerardo  Next Visit: None Scheduled  None         None Found                                                            Last  Test          Frequency    Reason                     Performed    Due Date  --------------------------------------------------------------------------------    TSH.........  12 months..  levothyroxine............  12-   12-    [facility] Embedded Care Due Messages. Reference number: [id number].   4/11/2024 9:37:14 PM CDT

## 2024-04-16 ENCOUNTER — TELEPHONE (OUTPATIENT)
Dept: ORTHOPEDICS | Facility: CLINIC | Age: 54
End: 2024-04-16
Payer: COMMERCIAL

## 2024-04-16 DIAGNOSIS — E03.9 HYPOTHYROIDISM, UNSPECIFIED TYPE: ICD-10-CM

## 2024-04-16 RX ORDER — LEVOTHYROXINE SODIUM 75 UG/1
75 TABLET ORAL
Qty: 90 TABLET | Refills: 0 | OUTPATIENT
Start: 2024-04-16

## 2024-04-16 NOTE — TELEPHONE ENCOUNTER
No care due was identified.  [facility] Embedded Care Due Messages. Reference number: [id number].   4/16/2024 12:54:06 AM CDT

## 2024-04-16 NOTE — TELEPHONE ENCOUNTER
Refill Decision Note  Quick DC. Request already responded to by other means (e.g. phone or fax)    Jessica Nelson  is requesting a refill authorization.  Brief Assessment and Rationale for Refill:  Quick Discontinue     Medication Therapy Plan:       Medication Reconciliation Completed: No   Comments:           Note composed:2:27 PM 04/16/2024

## 2024-04-18 ENCOUNTER — OFFICE VISIT (OUTPATIENT)
Dept: ORTHOPEDICS | Facility: CLINIC | Age: 54
End: 2024-04-18
Payer: COMMERCIAL

## 2024-04-18 VITALS — BODY MASS INDEX: 20.33 KG/M2 | WEIGHT: 142 LBS | HEIGHT: 70 IN

## 2024-04-18 DIAGNOSIS — Z98.890 POST-OPERATIVE STATE: Primary | ICD-10-CM

## 2024-04-18 DIAGNOSIS — S63.641D RUPTURE OF ULNAR COLLATERAL LIGAMENT OF RIGHT THUMB, SUBSEQUENT ENCOUNTER: ICD-10-CM

## 2024-04-18 PROCEDURE — 99024 POSTOP FOLLOW-UP VISIT: CPT | Mod: S$GLB,,, | Performed by: SPECIALIST/TECHNOLOGIST

## 2024-04-18 PROCEDURE — 99999 PR PBB SHADOW E&M-EST. PATIENT-LVL III: CPT | Mod: PBBFAC,,, | Performed by: SPECIALIST/TECHNOLOGIST

## 2024-04-18 PROCEDURE — 1159F MED LIST DOCD IN RCRD: CPT | Mod: CPTII,S$GLB,, | Performed by: SPECIALIST/TECHNOLOGIST

## 2024-04-18 NOTE — PROGRESS NOTES
"Ms. Nelson is here today for a post-operative visit.  She is 13 days status post R Thumb UCL Repair by Dr. Staples on 4/5/24. She reports that she is minimal pain. She denies taking any pain medications.  She denies fever, chills, and sweats since the time of the surgery.     Physical exam:    Vitals:    04/18/24 1402   Weight: 64.4 kg (141 lb 15.6 oz)   Height: 5' 10" (1.778 m)   PainSc: 0-No pain     Vital signs are stable, patient is afebrile.  Patient is well dressed and well groomed, no acute distress.  Alert and oriented to person, place, and time.  Post op dressing taken down.  Incision is clean, dry and intact.  There is no erythema or exudate.  There is no sign of any infection. She is NVI. Sutures removed without difficulty.  We will make a composite fist.    Assessment:  s/p R Thumb UCL Repair         Plan:  Jessica was seen today for swelling.    Diagnoses and all orders for this visit:    Post-operative state    Rupture of ulnar collateral ligament of right thumb, subsequent encounter      PO instruction reviewed and provided to patient  We will transition patient to a thumb spica cast to be worn for 2 weeks.  Patient we will follow up in 2 weeks' time for cast removal.  Order therapy today to begin in 2 weeks.  Instructed patient to perform no lifting pushing or pulling with the right hand.  No pinching for 6 weeks for operatively.  "

## 2024-04-30 ENCOUNTER — TELEPHONE (OUTPATIENT)
Dept: ORTHOPEDICS | Facility: CLINIC | Age: 54
End: 2024-04-30
Payer: COMMERCIAL

## 2024-05-02 ENCOUNTER — CLINICAL SUPPORT (OUTPATIENT)
Dept: REHABILITATION | Facility: HOSPITAL | Age: 54
End: 2024-05-02
Payer: COMMERCIAL

## 2024-05-02 ENCOUNTER — OFFICE VISIT (OUTPATIENT)
Dept: ORTHOPEDICS | Facility: CLINIC | Age: 54
End: 2024-05-02
Payer: COMMERCIAL

## 2024-05-02 VITALS — BODY MASS INDEX: 20.33 KG/M2 | HEIGHT: 70 IN | WEIGHT: 142 LBS

## 2024-05-02 DIAGNOSIS — Z98.890 POST-OPERATIVE STATE: ICD-10-CM

## 2024-05-02 DIAGNOSIS — S63.641D RUPTURE OF ULNAR COLLATERAL LIGAMENT OF RIGHT THUMB, SUBSEQUENT ENCOUNTER: ICD-10-CM

## 2024-05-02 DIAGNOSIS — Z98.890 POST-OPERATIVE STATE: Primary | ICD-10-CM

## 2024-05-02 DIAGNOSIS — M79.644 PAIN OF RIGHT THUMB: Primary | ICD-10-CM

## 2024-05-02 PROCEDURE — L3913 HFO W/O JOINTS CF: HCPCS

## 2024-05-02 PROCEDURE — 99999 PR PBB SHADOW E&M-EST. PATIENT-LVL III: CPT | Mod: PBBFAC,,, | Performed by: SPECIALIST/TECHNOLOGIST

## 2024-05-02 PROCEDURE — 97760 ORTHOTIC MGMT&TRAING 1ST ENC: CPT

## 2024-05-02 PROCEDURE — 99024 POSTOP FOLLOW-UP VISIT: CPT | Mod: S$GLB,,, | Performed by: SPECIALIST/TECHNOLOGIST

## 2024-05-02 PROCEDURE — 1159F MED LIST DOCD IN RCRD: CPT | Mod: CPTII,S$GLB,, | Performed by: SPECIALIST/TECHNOLOGIST

## 2024-05-02 NOTE — PROGRESS NOTES
"    Occupational Therapy Orthotic Note    Patient: Jessica Nelson MD  MRN: [id number]  Date of visit: 5/2/2024  Referring Physician:  Desean Keyes PA-C   Primary [id number]: S/P UCL repair right thumb   Treatment Diagnosis:   Encounter Diagnoses   Name Primary?    Rupture of ulnar collateral ligament of right thumb, subsequent encounter     Post-operative state     Pain of right thumb Yes     DOS4/5/24    Time In: 345p  Time Out: 430p    L Code Fabricated:     Subjective:     "It feels pretty good actually"    Objective:     Patient seen by OT this session for fabrication of orthosis per MD orders. Fabricated and applied hand-based thumb spica to right hand.     Extensive time spent going through HEP and orthotic wearing instructions with patient demonstrating correct form and no exhibiting any compensatory mechanisms. Pt was able to demonstrate and teach-back in order to ensure she was performing exercises correctly for 5 minutes.     Assessment:     Orthosis with good fit following fabrication. Pt. Able to kimberly/doff independently.      Patient Education/Response:   Pt. Instructed to wear continuous, remove for bathing or hygiene. Patient/caregiver were provided written instructions on orthosis purpose, wear schedule, care and precautions to monitor for increased pain/edema, pressure points, skin breakdown or redness/skin irritation Patient/caregiver to contact clinic for adjustments as needed.  Patient signed copy of orthosis instructions, acknowledging delivery and understanding of wear, care, and precautions     (see Media for scanned documents)    Plans and Goals:     Goal of Orthosis to protect sx site/protect injury site 24/7.   "

## 2024-05-02 NOTE — PROGRESS NOTES
"5/2/24  Patient reports 4 weeks status post right thumb UCL reconstruction.  She states she has in no pain.  She has been in a thumb spica cast for the past 2 weeks.  She is to begin therapy today.    4/18/24  Ms. Nelson is here today for a post-operative visit.  She is 13 days status post R Thumb UCL Repair by Dr. Staples on 4/5/24. She reports that she is minimal pain. She denies taking any pain medications.  She denies fever, chills, and sweats since the time of the surgery.     Physical exam:    Vitals:    05/02/24 1401   Weight: 64.4 kg (141 lb 15.6 oz)   Height: 5' 10" (1.778 m)   PainSc: 0-No pain     Vital signs are stable, patient is afebrile.  Patient is well dressed and well groomed, no acute distress.  Alert and oriented to person, place, and time.  Cast removed.  Incision is clean, dry and intact.  There is no erythema or exudate.  There is no sign of any infection. She is NVI.  Opposition to the D IP joint of the small finger. We will make a composite fist.    Assessment:  s/p R Thumb UCL Repair         Plan:  Jessica was seen today for follow-up.    Diagnoses and all orders for this visit:    Post-operative state    Rupture of ulnar collateral ligament of right thumb, subsequent encounter      Patient begin therapy today he will make her custom thumb spica to be worn full-time for the next 2 weeks.  She is cleared to do range-of-motion exercises of the thumb, but recommend no pinching for another 2 more weeks.  Therapy we will guide her through this process.    Patient we will follow up in 6 weeks for a motion check in strength testing.  "

## 2024-05-02 NOTE — PATIENT INSTRUCTIONS
[facility]  OCCUPATIONAL THERAPY  HOME EXERCISE PROGRAM         Place pad of fingertip on scar area. Apply steady downward pressure while moving in circular fashion using Vaseline/Aquaphor . Use another finger on top to assist. Repeat until entire scar has been covered.  Repeat for  5 minutes. Do 2  sessions per day.    - if you have a scab , place small amount of vaseline and bandaid ( the scab will gentle off to allow the tissue to heal faster)    - scar pads can be applied once the scar is completely closed and with no scabs. Sheets should be worn at least 4 hours a day. If they are not irritating, you can wear them 24 hours a day. Very important to check your skin and make sure it not irritated by the moisture . The scar bandage can be cut to shape of your scar and can last days and with showers. Change if dirty/sweaty.  You can find scar pads over the counter.            Perform each exercise 12-15 times, 3-5 times per day.      No

## 2024-05-09 ENCOUNTER — CLINICAL SUPPORT (OUTPATIENT)
Dept: REHABILITATION | Facility: HOSPITAL | Age: 54
End: 2024-05-09
Payer: COMMERCIAL

## 2024-05-09 DIAGNOSIS — M79.644 PAIN OF RIGHT THUMB: Primary | ICD-10-CM

## 2024-05-09 PROCEDURE — 97165 OT EVAL LOW COMPLEX 30 MIN: CPT

## 2024-05-09 PROCEDURE — 97530 THERAPEUTIC ACTIVITIES: CPT

## 2024-05-09 PROCEDURE — 97018 PARAFFIN BATH THERAPY: CPT

## 2024-05-09 NOTE — PLAN OF CARE
[facility]  Occupational Therapy Initial Evaluation    Name: Jessica Nelson MD  Clinic Number: [id number]    Therapy Diagnosis:   Encounter Diagnosis   Name Primary?    Pain of right thumb Yes     Physician: Desean Keyes PA-C  Physician Orders: Custom orthosis and eval and treat   Medical Diagnosis: S/P UCL repair right thumb  Surgical Procedure and Date: 4/5/24  Evaluation Date: 5/9/2024  Insurance Authorization Period Expiration: 12/31/24  Plan of Care Certification Period: 7/19/24  Visit # / Visits authorized: 1 / 20  FOTO: Eval: 53%     Precautions:  Standard    Time In:330p  Time Out: 415p  Total Appointment Time (timed & untimed codes): 45 minutes    SUBJECTIVE     History of Current Condition/Mechanism of Injury: Jessica reports: I fell over my water bottle and dislocated and tore this thumb.     Falls: 1    Involved Side: Right  Dominant Side: Right    Mechanism of Injury: Trauma - fell onto water bottle and dislocated thumb   Surgical Procedure: UCL repair right thumb     Prior Therapy: None    Pain:  Functional Pain Scale Rating 0-10:   1/10 on average  1/10 at best  3/10 at worst  Location: Right dorsal and dorsolateral thumb aspect   Description: Tight  Aggravating Factors: Extension and Flexing  Easing Factors: rest    Occupation:  Physician       Functional Limitations/Social History:    Previous functional status includes: Independent with all ADLs.     Current Functional Status   Home/Living environment: lives with their family    - DME: none      Limitation of Functional Status as follows:   ADLs/IADLs:     - Feeding: Mod I    - Bathing: Mod I     - Dressing/Grooming: Mod I    - Home Management: Mod I    - Driving: Mod I       Patient's Goals for Therapy: Regain full use of hand     Past Medical History/Physical Systems Review:   Jessica Nelson MD  has a past medical history of Alopecia areata, Benign essential HTN, Bilateral retinal lattice degeneration, Depression,  Essential hypertension, Nodular fasciitis, Subclinical hypothyroidism, and Venous insufficiency.    Jessica Nelson MD  has a past surgical history that includes Tonsillectomy; Retinal laser procedure (2007); Maxillofacial Surgery (1986); Nodule Excision (2005); Colonoscopy (N/A, 12/17/2020); and Repair of collateral ligament of thumb (Right, 4/5/2024).    Jessica has a current medication list which includes the following prescription(s): acetaminophen, calcium-vitamin d, carvedilol, escitalopram oxalate, ibuprofen, levothyroxine, ondansetron, and oxycodone-acetaminophen, and the following Facility-Administered Medications: lidocaine hcl 10 mg/ml (1%).    Review of patient's allergies indicates:   Allergen Reactions    Lisinopril Other (See Comments)     cough    Morphine      Other reaction(s): Vomiting  Other reaction(s): Nausea          OBJECTIVE   Hand ROM. Measured in degrees.   5/9/2024    Right       Thumb: MP 0/30                IP 0/40       Rad ADD/ABD 45       Pal ADD/ABD 45          Opposition 0       Limitation/Restriction for FOTO Survey    Therapist reviewed FOTO scores for Jessica Nelson MD on 5/9/2024.   FOTO documents entered into EPIC - see Media section.  Functional Score: 53%         Treatment   Total Treatment time (time-based codes) separate from Evaluation: 25 minutes    Jessica received the treatments listed below:     Supervised modalities after being cleared for contradictions:   -Patient received paraffin bath to R hand(s) for 10 minutes to increase blood flow, circulation, pain management and for tissue elasticity prior to therex.      Therapeutic activities to improve functional performance for 15  minutes, including:  - Reviewed HEP and modality use for pain management as well as educated as outlined below including pacing for home management and self care.   -Patient performed blocking FPL/B, thumb flexion, opposition, palmar/radial AB/AD, circumduction  x 15 reps for ROM. Extensive time  spent going through HEP with patient demonstrating correct form and no exhibiting any compensatory mechanisms. Pt was able to demonstrate and teach-back in order to ensure she was performing exercises correctly.      Patient Education and Home Exercises      Education provided:   -role of OT, goals for OT, scheduling/cancellations, insurance limitations with patient.  -Additional Education provided: Tissue healing timeline    Written Home Exercises Provided: yes.  Exercises were reviewed and Jessica was able to demonstrate them prior to the end of the session.  Jessica demonstrated good  understanding of the education provided.   See EMR under Patient Instructions for exercises provided 5/9/2024.    Pt was advised to perform these exercises free of pain, and to stop performing them if pain occurs.      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      Assessment of Occupational Performance   Performance Deficits    Physical:  Joint Mobility  Muscle Power/Strength  Muscle Endurance  Skin Integrity/Scar Formation  Edema   Strength  Pinch Strength  Gross Motor Coordination  Fine Motor Coordination  Proprioception Functions  Pain    Cognitive:  No Deficits    Psychosocial:    No Deficits     Following medical record review it is determined that pt will benefit from occupational therapy services in order to maximize pain free and/or functional use of right thumb. The following goals were discussed with the patient and patient is in agreement with them as to be addressed in the treatment plan. The patient's rehab potential is Excellent.     Anticipated barriers to occupational therapy: None    Plan of care discussed with patient: Yes  Patient's spiritual, cultural and educational needs considered and patient is agreeable to the plan of care and goals as stated below:     Medical Necessity  is demonstrated by the following  Occupational Profile/History  Co-morbidities and personal factors that may impact the plan of care [x] LOW: Brief chart review  [] MODERATE: Expanded chart review   [] HIGH: Extensive chart review       Examination  Performance deficits relating to physical, cognitive or psychosocial skills that result in activity limitations and/or participation restrictions  [] LOW: addressing 1-3 Performance deficits  [] MODERATE: 3-5 Performance deficits  [x] HIGH: 5+ Performance deficits (please support below)       Treatment Options [] LOW: Limited options  [] MODERATE: Several options  [x] HIGH: Multiple options      Decision Making/ Complexity Score: low       The following goals were discussed with the patient and patient is in agreement with them as to be addressed in the treatment plan.     Goals:   1)   Patient to be IND with HEP and modalities for pain management  2)   Increase ROM 15 degrees in thumb MP flexion plane of motion to increase functional hand use for ADLs.   3)   Improve ROM at least 10 degrees in both palmar and radial abduction plane of motion in order to assist with work tasks.  4)   Assess  and pinch.   5)   Decrease edema at least 0.3 cm to increase joint mobility /flexibility for improved overall functional hand use.   6)   Pt will report  1 out of 10 pain with HEP for at least 3 consecutive sessions indicative of improved function participation in ADLs and IADLs.  7)   Patient to score at 70% or more on FOTO to demonstrate improved perception of functional UE use.  8)   Pt will return to near to prior level of function for ADLs and household management reporting I or Mod I with ADLs (dressing, feeding, grooming, toileting).       PLAN   Plan of Care Certification: 5/9/2024 to 7/19/2024.     Outpatient Occupational Therapy 2 times weekly for 10 weeks to include the following interventions: Paraffin, Fluidotherapy, Manual therapy/joint mobilizations, Modalities for  pain management, US 3 mhz, Therapeutic exercises/activities., Strengthening, Orthotic Fabrication/Fit/Training, Edema Control, Scar Management, Wound Care, Electrical Modalities, Joint Protection, and Energy Conservation.      Sherri Long, OT , CHT           I CERTIFY THE NEED FOR THESE SERVICES FURNISHED UNDER THIS PLAN OF TREATMENT AND WHILE UNDER MY CARE  Physician's comments:      Physician's Signature: ___________________________________________________

## 2024-05-09 NOTE — PATIENT INSTRUCTIONS
[facility]  OCCUPATIONAL THERAPY  HOME EXERCISE PROGRAM         Place pad of fingertip on scar area. Apply steady downward pressure while moving in circular fashion using Vaseline/Aquaphor . Use another finger on top to assist. Repeat until entire scar has been covered.  Repeat for  5 minutes. Do 2  sessions per day.    - if you have a scab , place small amount of vaseline and bandaid ( the scab will gentle off to allow the tissue to heal faster)    - scar pads can be applied once the scar is completely closed and with no scabs. Sheets should be worn at least 4 hours a day. If they are not irritating, you can wear them 24 hours a day. Very important to check your skin and make sure it not irritated by the moisture . The scar bandage can be cut to shape of your scar and can last days and with showers. Change if dirty/sweaty.  You can find scar pads over the counter.      MP Flexion (Assistive)        With hand resting on little finger side, slide thumb across palm.  Repeat 15 times. Do 3-5 sessions per day.    Copyright © I. All rights reserved.          IP Flexion (Active Blocked)        Brace thumb below tip joint. Bend joint as far as possible.  Repeat 15 times. Do 3-5 sessions per day.      Radial Adduction/Abduction (Active)        Move thumb out to side. Move back alongside index finger.  Repeat 15 times. Do 3-5 sessions per day.          Palmar Adduction/Abduction (Active)        Move thumb down, away from palm. Move back to rest along palm.  Repeat 15 times. Do 3-5 sessions per day.

## 2024-05-13 PROBLEM — M79.644 PAIN OF RIGHT THUMB: Status: ACTIVE | Noted: 2024-05-13

## 2024-05-16 ENCOUNTER — CLINICAL SUPPORT (OUTPATIENT)
Dept: REHABILITATION | Facility: HOSPITAL | Age: 54
End: 2024-05-16
Payer: COMMERCIAL

## 2024-05-16 DIAGNOSIS — M79.644 PAIN OF RIGHT THUMB: Primary | ICD-10-CM

## 2024-05-16 PROCEDURE — 97140 MANUAL THERAPY 1/> REGIONS: CPT

## 2024-05-16 PROCEDURE — 97530 THERAPEUTIC ACTIVITIES: CPT

## 2024-05-16 PROCEDURE — 97018 PARAFFIN BATH THERAPY: CPT | Mod: 59

## 2024-05-16 NOTE — PROGRESS NOTES
[facility]  Occupational Therapy Treatment Note     Date: 5/16/2024  Name: Jessica MD Omar  Clinic Number: [id number]    Therapy Diagnosis:   Encounter Diagnosis   Name Primary?    Pain of right thumb Yes     Physician: Desean Keyes PA-C  Physician Orders: Custom orthosis and eval and treat   Medical Diagnosis: S/P UCL repair right thumb  Surgical Procedure and Date: 4/5/24  Evaluation Date: 5/9/2024  Insurance Authorization Period Expiration: 12/31/24  Plan of Care Certification Period: 7/19/24  Visit # / Visits authorized: 3 / 20  FOTO: Eval: 53%      Precautions:  Standard     Time In:330p  Time Out: 415p  Total Appointment Time (timed & untimed codes): 45 minutes      Subjective     Patient reports: My pain is so low I'm doing great.   She was compliant with home exercise program given last session.     Pain: 1/10  Location: right thumb      Objective     Objective Measures updated at progress report unless specified.    Treatment     Jessica received the treatments listed below:       Supervised modalities after being cleared for contradictions:   -Patient received paraffin bath to R hand(s) for 10 minutes to increase blood flow, circulation, pain management and for tissue elasticity prior to therex.       Therapeutic activities to improve functional performance for 15  minutes, including:  - Reviewed HEP and modality use for pain management as well as educated as outlined below including pacing for home management and self care.   -Patient performed blocking FPL/B, thumb flexion, opposition, palmar/radial AB/AD, circumduction  x 15 reps for ROM. Extensive time spent going through HEP with patient demonstrating correct form and no exhibiting any compensatory mechanisms. Pt was able to demonstrate and teach-back in order to ensure she was performing exercises correctly.       Manual therapy techniques: for 10 min (including Joint mobilizations, Myofacial release, Manual Lymphatic  Drainage, Soft tissue Mobilization, and Friction Massage)  were applied to the R thumb  as follows:   -instructed in dycem  and provided for home scar management   - scar massage and scar pump to thumb to decrease adhesions and improve joint flexibility   - provided mepitac for home scar management      Patient Education and Home Exercises     Education provided:   - Progress towards goals     Written Home Exercises Provided: Patient instructed to cont prior HEP.  Exercises were reviewed and Jessica was able to demonstrate them prior to the end of the session.  Jessica demonstrated good  understanding of the home exercise program provided. See electronic medical record under Patient Instructions for exercises provided during therapy sessions.       Assessment     Jessica is progressing well towards her goals and there are no updates to goals at this time. Pt prognosis is Excellent.     Patient will continue to benefit from skilled outpatient occupational therapy to address the deficits listed in the problem list on initial evaluation provide patient/family education and to maximize patient's level of independence in the home and community environment.     Patient's spiritual, cultural and educational needs considered and patient agreeable to plan of care and goals.    Anticipated barriers to occupational therapy: None    Goals:  1)   Patient to be IND with HEP and modalities for pain management  2)   Increase ROM 15 degrees in thumb MP flexion plane of motion to increase functional hand use for ADLs.   3)   Improve ROM at least 10 degrees in both palmar and radial abduction plane of motion in order to assist with work tasks.  4)   Assess  and pinch.   5)   Decrease edema at least 0.3 cm to increase joint mobility /flexibility for improved overall functional hand use.   6)   Pt will report  1 out of 10 pain with HEP for at least 3 consecutive sessions indicative of improved function participation in ADLs and IADLs.  7)    Patient to score at 70% or more on FOTO to demonstrate improved perception of functional UE use.  8)   Pt will return to near to prior level of function for ADLs and household management reporting I or Mod I with ADLs (dressing, feeding, grooming, toileting).     Plan     Updates/Grading for next session: continue poc     Sherri Long, OT   5/16/2024

## 2024-05-22 ENCOUNTER — CLINICAL SUPPORT (OUTPATIENT)
Dept: REHABILITATION | Facility: HOSPITAL | Age: 54
End: 2024-05-22
Payer: COMMERCIAL

## 2024-05-22 DIAGNOSIS — M79.644 PAIN OF RIGHT THUMB: Primary | ICD-10-CM

## 2024-05-22 PROCEDURE — 97018 PARAFFIN BATH THERAPY: CPT | Mod: 59

## 2024-05-22 PROCEDURE — 97140 MANUAL THERAPY 1/> REGIONS: CPT

## 2024-05-22 PROCEDURE — 97530 THERAPEUTIC ACTIVITIES: CPT

## 2024-05-22 NOTE — PROGRESS NOTES
[facility]  Occupational Therapy Treatment Note    Date: 5/22/2024  Name: Jessica Nelson MD  Clinic Number: [id number]    Therapy Diagnosis:        Encounter Diagnosis   Name Primary?    Pain of right thumb Yes      Physician: Desean Keyes PA-C  Physician Orders: Custom orthosis and eval and treat     Medical Diagnosis: S/P UCL repair right thumb 4/5/24    Surgical Procedure and Date: 4/5/24  Evaluation Date: 5/9/2024  Insurance Authorization Period Expiration: 12/31/24  Plan of Care Certification Period: 7/19/24  Visit # / Visits authorized: 4/ 20  FOTO: Eval: 53%      Precautions:  Standard     Time In:300 p  Time Out: 345 p  Total Appointment Time (timed & untimed codes): 45 minutes    SUBJECTIVE     Pt reports: Its doing pretty good, some stiffness; I will be out of town till mid-June. Followup on 6/13/24  She was compliant with home exercise program given last session.   Response to previous treatment:more motion   Functional change: none     Pain: 2/10  Location: right thumb, stiffness      OBJECTIVE   RE-assess on 6/13 for MD visit same day and FOTO    Objective Measures updated at progress report unless specified.  Hand ROM. Measured in degrees.    5/9/2024     Right         Thumb: MP 0/30                IP 0/40       Rad ADD/ABD 45       Pal ADD/ABD 45          Opposition 0         Limitation/Restriction for FOTO Survey     Therapist reviewed FOTO scores for Jessica Nelson MD on 5/9/2024.   FOTO documents entered into [employer] - see Media section.  Functional Score: 53%         Treatment     Jessica received the treatments listed below:     Supervised modalities after being cleared for contradictions:   -Patient received paraffin bath to R hand(s) for 10 minutes to increase blood flow, circulation, pain management and for tissue elasticity prior to therex.       Therapeutic activities to improve functional performance for 15  minutes, including:  - Reviewed HEP and modality use for  pain management as well as educated as outlined below including pacing for home management and self care.   -Patient performed blocking FPL/B, thumb flexion, opposition, palmar/radial AB/AD, circumduction  x 15 reps for ROM. Extensive time spent going through HEP with patient demonstrating correct form and no exhibiting any compensatory mechanisms. Pt was able to demonstrate and teach-back in order to ensure she was performing exercises correctly.       Manual therapy techniques: for 10 min (including Joint mobilizations, Myofacial release, Manual Lymphatic Drainage, Soft tissue Mobilization, and Friction Massage)  were applied to the R thumb  as follows:   -instructed in dycem  and provided for home scar management   - scar massage and scar pump to thumb to decrease adhesions and improve joint flexibility   - provided mepitac for home scar management        Patient Education and Home Exercises      Education provided:   - Cont HEP .  - scar management   - Progress towards goals     Written Home Exercises Provided: Patient instructed to cont prior HEP.  Exercises were reviewed and Jessica was able to demonstrate them prior to the end of the session.  Jessica demonstrated good  understanding of the HEP provided. See EMR under Patient Instructions for exercises provided during therapy sessions.       Assessment     Jessica is progressing well towards her goals and there are no updates to goals at this time. Pt prognosis is Good. Thumb ROM improving; good stability noted.  Will progress with pinch strengthening at 6-8 weeks post op    Pt will continue to benefit from skilled outpatient occupational therapy to address the deficits listed in the problem list on initial evaluation provide pt/family education and to maximize pt's level of independence in the home and community environment.     Pt's spiritual, cultural and educational needs considered and pt agreeable to plan of care and goals.    Anticipated barriers to  occupational therapy: none     The following goals were discussed with the patient and patient is in agreement with them as to be addressed in the treatment plan.      Goals:   1)   Patient to be IND with HEP and modalities for pain management  2)   Increase ROM 15 degrees in thumb MP flexion plane of motion to increase functional hand use for ADLs.   3)   Improve ROM at least 10 degrees in both palmar and radial abduction plane of motion in order to assist with work tasks.  4)   Assess  and pinch.   5)   Decrease edema at least 0.3 cm to increase joint mobility /flexibility for improved overall functional hand use.   6)   Pt will report  1 out of 10 pain with HEP for at least 3 consecutive sessions indicative of improved function participation in ADLs and IADLs.  7)   Patient to score at 70% or more on FOTO to demonstrate improved perception of functional UE use.  8)   Pt will return to near to prior level of function for ADLs and household management reporting I or Mod I with ADLs (dressing, feeding, grooming, toileting).         PLAN   Plan of Care Certification: 5/9/2024 to 7/19/2024.         Kiara Kerns, OT , CHT

## 2024-05-24 DIAGNOSIS — F41.1 GAD (GENERALIZED ANXIETY DISORDER): ICD-10-CM

## 2024-05-24 RX ORDER — ESCITALOPRAM OXALATE 10 MG/1
10 TABLET ORAL
Qty: 90 TABLET | Refills: 0 | Status: SHIPPED | OUTPATIENT
Start: 2024-05-24

## 2024-05-25 NOTE — TELEPHONE ENCOUNTER
Provider Staff:  Action required for this patient    Requires appointment      Please see care gap opportunities below in Care Due Message.    Thanks!  [facility]     Appointments      Date Provider   Last Visit   5/4/2023 Angela Gerardo MD   Next Visit   Visit date not found Angela Gerardo MD     Refill Decision Note   Jessica Nelson  is requesting a refill authorization.  Brief Assessment and Rationale for Refill:  Approve     Medication Therapy Plan:         Comments:     Note composed:9:46 PM 05/24/2024

## 2024-05-25 NOTE — TELEPHONE ENCOUNTER
Care Due:                  Date            Visit Type   Department     Provider  --------------------------------------------------------------------------------                                EP -                              PRIMARY      [facility] Primary  Last Visit: 05-      CARE ([facility])   Care           Angela Gerardo  Next Visit: None Scheduled  None         None Found                                                            Last  Test          Frequency    Reason                     Performed    Due Date  --------------------------------------------------------------------------------    Office Visit  15 months..  EScitalopram, carvediloL,   05- 07-                             levothyroxine............    [employer] Embedded Care Due Messages. Reference number: [id number].   5/24/2024 9:38:21 PM CDT

## 2024-06-10 ENCOUNTER — TELEPHONE (OUTPATIENT)
Dept: ORTHOPEDICS | Facility: CLINIC | Age: 54
End: 2024-06-10
Payer: COMMERCIAL

## 2024-06-12 NOTE — PROGRESS NOTES
[facility]  Occupational Therapy Treatment Note    Date: 6/13/2024  Name: Jessica Nelson MD  Clinic Number: [id number]    Therapy Diagnosis:        Encounter Diagnosis   Name Primary?    Pain of right thumb Yes      Physician: Desean Keyes PA-C  Physician Orders: Custom orthosis and eval and treat     Medical Diagnosis: S/P UCL repair right thumb 4/5/24    Surgical Procedure and Date: 4/5/24  Evaluation Date: 5/9/2024  Insurance Authorization Period Expiration: 12/31/24  Plan of Care Certification Period: 7/19/24  Visit # / Visits authorized: 4/ 20  FOTO: Eval: 53%      Precautions:  Standard     Time In:815 am  Time Out: 900 am  Total Appointment Time (timed & untimed codes): 45 minutes    SUBJECTIVE     Pt reports: She feels good and wore her brace while on vacation.   She was compliant with home exercise program given last session.   Response to previous treatment:more motion   Functional change: none     Pain: 2/10  Location: right thumb, stiffness      OBJECTIVE   RE-assess on 6/13 for MD visit same day and FOTO    Objective Measures updated at progress report unless specified.  Hand ROM. Measured in degrees.    5/9/2024     Right         Thumb: MP 0/30                IP 0/40       Rad ADD/ABD 45       Pal ADD/ABD 45          Opposition 0         Limitation/Restriction for FOTO Survey     Therapist reviewed FOTO scores for Jessica Nelson MD on 5/9/2024.   FOTO documents entered into [employer] - see Media section.  Functional Score: 53%         Treatment     Jessica received the treatments listed below:     Supervised modalities after being cleared for contradictions:   -Patient received paraffin bath to R hand(s) for 10 minutes to increase blood flow, circulation, pain management and for tissue elasticity prior to therex.       Therapeutic activities to improve functional performance for 15  minutes, including:  - Medium pom poms: thumb opposition pick ups x 3 containers  - Thumb rolls on  "RF and SF: 15 each  - AAROM: thumb MP flexion; thumb IP flexion; Radial abduction; palmar abduction x 10 with 5" hold each  - Wrist active range of motion with 1# (3-ways): 2 x 10 each  - Patient performed blocking FPL/B, thumb flexion, opposition, palmar/radial AB/AD, circumduction  x 10 reps for ROM.   - Isospheres x 3'  - Yellow putty: gripping; pinch and roll x 2' each    Extensive time spent going through HEP with patient demonstrating correct form and no exhibiting any compensatory mechanisms. Pt was able to demonstrate and teach-back in order to ensure she was performing exercises correctly.       Manual therapy techniques: for 10 min (including Joint mobilizations, Myofacial release, Manual Lymphatic Drainage, Soft tissue Mobilization, and Friction Massage)  were applied to the R thumb  as follows:   -instructed in dycem  and provided for home scar management   - scar massage and scar pump to thumb to decrease adhesions and improve joint flexibility   - provided mepitac for home scar management        Patient Education and Home Exercises      Education provided:   - Updated HEP  - Progress towards goals   - Yellow putty provided to patient    Written Home Exercises Provided: Patient instructed to cont prior HEP.  Exercises were reviewed and Jessica was able to demonstrate them prior to the end of the session.  Jessica demonstrated good  understanding of the HEP provided. See EMR under Patient Instructions for exercises provided during therapy sessions.       Assessment     Jessica is progressing well towards her goals and there are no updates to goals at this time. Good progression to perform strengthening activities this date. Therapy to focus on strengthening and end range of motion.     Pt will continue to benefit from skilled outpatient occupational therapy to address the deficits listed in the problem list on initial evaluation provide pt/family education and to maximize pt's level of independence in the home " and community environment.     Pt's spiritual, cultural and educational needs considered and pt agreeable to plan of care and goals.    Anticipated barriers to occupational therapy: none     The following goals were discussed with the patient and patient is in agreement with them as to be addressed in the treatment plan.      Goals:   1)   Patient to be IND with HEP and modalities for pain management  2)   Increase ROM 15 degrees in thumb MP flexion plane of motion to increase functional hand use for ADLs.   3)   Improve ROM at least 10 degrees in both palmar and radial abduction plane of motion in order to assist with work tasks.  4)   Assess  and pinch.   5)   Decrease edema at least 0.3 cm to increase joint mobility /flexibility for improved overall functional hand use.   6)   Pt will report  1 out of 10 pain with HEP for at least 3 consecutive sessions indicative of improved function participation in ADLs and IADLs.  7)   Patient to score at 70% or more on FOTO to demonstrate improved perception of functional UE use.  8)   Pt will return to near to prior level of function for ADLs and household management reporting I or Mod I with ADLs (dressing, feeding, grooming, toileting).         PLAN   Plan of Care Certification: 5/9/2024 to 7/19/2024.         Clark Block, OT , CHT

## 2024-06-13 ENCOUNTER — OFFICE VISIT (OUTPATIENT)
Dept: ORTHOPEDICS | Facility: CLINIC | Age: 54
End: 2024-06-13
Payer: COMMERCIAL

## 2024-06-13 ENCOUNTER — CLINICAL SUPPORT (OUTPATIENT)
Dept: REHABILITATION | Facility: HOSPITAL | Age: 54
End: 2024-06-13
Payer: COMMERCIAL

## 2024-06-13 VITALS — HEIGHT: 70 IN | WEIGHT: 142 LBS | BODY MASS INDEX: 20.33 KG/M2

## 2024-06-13 DIAGNOSIS — S63.641D RUPTURE OF ULNAR COLLATERAL LIGAMENT OF RIGHT THUMB, SUBSEQUENT ENCOUNTER: ICD-10-CM

## 2024-06-13 DIAGNOSIS — Z98.890 POST-OPERATIVE STATE: Primary | ICD-10-CM

## 2024-06-13 DIAGNOSIS — M79.644 PAIN OF RIGHT THUMB: Primary | ICD-10-CM

## 2024-06-13 PROCEDURE — 97140 MANUAL THERAPY 1/> REGIONS: CPT

## 2024-06-13 PROCEDURE — 99024 POSTOP FOLLOW-UP VISIT: CPT | Mod: S$GLB,,, | Performed by: SPECIALIST/TECHNOLOGIST

## 2024-06-13 PROCEDURE — 97018 PARAFFIN BATH THERAPY: CPT

## 2024-06-13 PROCEDURE — 99999 PR PBB SHADOW E&M-EST. PATIENT-LVL II: CPT | Mod: PBBFAC,,, | Performed by: SPECIALIST/TECHNOLOGIST

## 2024-06-13 PROCEDURE — 97530 THERAPEUTIC ACTIVITIES: CPT

## 2024-06-13 NOTE — PROGRESS NOTES
"6/13/24  Patient reports 10 weeks status post right thumb UCL reconstruction.  She reports she has in no pain.  She states that she is continues to wear her custom orthosis.  She states that she was out of town for the past 2 weeks and was not able to do therapy.    5/2/24  Patient reports 4 weeks status post right thumb UCL reconstruction.  She states she has in no pain.  She has been in a thumb spica cast for the past 2 weeks.  She is to begin therapy today.    4/18/24  Ms. Nelson is here today for a post-operative visit.  She is 13 days status post R Thumb UCL Repair by Dr. Staples on 4/5/24. She reports that she is minimal pain. She denies taking any pain medications.  She denies fever, chills, and sweats since the time of the surgery.     Physical exam:    Vitals:    06/13/24 0903   Weight: 64.4 kg (141 lb 15.6 oz)   Height: 5' 10" (1.778 m)     Vital signs are stable, patient is afebrile.  Patient is well dressed and well groomed, no acute distress.  Alert and oriented to person, place, and time.  Cast removed.  Incision is clean, dry and intact.  There is no erythema or exudate.  There is no sign of any infection. She is NVI.  Opposition to the base of the small finger. We will make a composite fist.    Assessment:  s/p R Thumb UCL Repair         Plan:  Diagnoses and all orders for this visit:    Post-operative state    Rupture of ulnar collateral ligament of right thumb, subsequent encounter        Continue with regular therapy.  Patient no longer needs to wear her custom orthosis.  Patient doing well and has no pain.  Patient we will follow up in clinic p.r.n.  "

## 2024-06-20 ENCOUNTER — CLINICAL SUPPORT (OUTPATIENT)
Dept: REHABILITATION | Facility: HOSPITAL | Age: 54
End: 2024-06-20
Payer: COMMERCIAL

## 2024-06-20 DIAGNOSIS — M79.644 PAIN OF RIGHT THUMB: Primary | ICD-10-CM

## 2024-06-20 PROCEDURE — 97018 PARAFFIN BATH THERAPY: CPT

## 2024-06-20 PROCEDURE — 97530 THERAPEUTIC ACTIVITIES: CPT

## 2024-06-20 PROCEDURE — 97140 MANUAL THERAPY 1/> REGIONS: CPT

## 2024-06-20 NOTE — PROGRESS NOTES
[facility]  Occupational Therapy Treatment Note    Date: 6/20/2024  Name: Jessica Nelson MD  Clinic Number: [id number]    Therapy Diagnosis:        Encounter Diagnosis   Name Primary?    Pain of right thumb Yes      Physician: Desean Keyes PA-C  Physician Orders: Custom orthosis and eval and treat     Medical Diagnosis: S/P UCL repair right thumb 4/5/24    Surgical Procedure and Date: 4/5/24  Evaluation Date: 5/9/2024  Insurance Authorization Period Expiration: 12/31/24  Plan of Care Certification Period: 7/19/24  Visit # / Visits authorized: 4/ 20  FOTO: Eval: 53%      Precautions:  Standard     Time In:4:00 pm  Time Out: 445 pm  Total Appointment Time (timed & untimed codes): 45 minutes    SUBJECTIVE     Pt reports: She has been experiencing increased tightness in the dorsal aspect of the thumb. She has noticed her writing grasp being a bit awkward    She was compliant with home exercise program given last session.   Response to previous treatment:more motion   Functional change: none     Pain: 0/10  Location: right thumb, stiffness      OBJECTIVE   RE-assess on 6/13 for MD visit same day and FOTO    Objective Measures updated at progress report unless specified.  Hand ROM. Measured in degrees.    5/9/2024     Right         Thumb: MP 0/30                IP 0/40       Rad ADD/ABD 45       Pal ADD/ABD 45          Opposition 0         Limitation/Restriction for FOTO Survey     Therapist reviewed FOTO scores for Jessica Nelson MD on 5/9/2024.   FOTO documents entered into [employer] - see Media section.  Functional Score: 53%         Treatment     Jessica received the treatments listed below:     Supervised modalities after being cleared for contradictions:   -Patient received paraffin bath to R hand(s) for 10 minutes to increase blood flow, circulation, pain management and for tissue elasticity prior to therex.       Therapeutic activities to improve functional performance for 15  minutes,  "including:  - AAROM: thumb MP flexion; thumb IP flexion; Radial abduction; palmar abduction x 10 with 5" hold each  - Wrist active range of motion with 1# (3-ways): 2 x 10 each (NT)  - Isospheres x 3' (NT)  - Yellow putty: gripping; pinch and roll; intrinsic cones; x 2' each                         3pt pinch; key pinch  x 10 each  - Red chip clip with large pom poms x 2 containers each     Manual therapy techniques: for 10 min (including Joint mobilizations, Myofacial release, Manual Lymphatic Drainage, Soft tissue Mobilization, and Friction Massage)  were applied to the R thumb  as follows:   - STM to incision area and thenar pad  - scar massage to incision area to improve elasticity       Patient Education and Home Exercises      Education provided:   - Updated HEP  - Progress towards goals   - Yellow putty provided to patient    Written Home Exercises Provided: Patient instructed to cont prior HEP.  Exercises were reviewed and Jessica was able to demonstrate them prior to the end of the session.  Jessica demonstrated good  understanding of the HEP provided. See EMR under Patient Instructions for exercises provided during therapy sessions.       Assessment     Jessica is progressing well towards her goals and there are no updates to goals at this time. Good progression to perform strengthening activities this date with some tightness over the proximal phalanx and into the IP joint but no reported pain. Therapy to focus on strengthening, functional pinch and end range of motion.     Pt will continue to benefit from skilled outpatient occupational therapy to address the deficits listed in the problem list on initial evaluation provide pt/family education and to maximize pt's level of independence in the home and community environment.     Pt's spiritual, cultural and educational needs considered and pt agreeable to plan of care and goals.    Anticipated barriers to occupational therapy: none     The following goals were " discussed with the patient and patient is in agreement with them as to be addressed in the treatment plan.      Goals:   1)   Patient to be IND with HEP and modalities for pain management  2)   Increase ROM 15 degrees in thumb MP flexion plane of motion to increase functional hand use for ADLs.   3)   Improve ROM at least 10 degrees in both palmar and radial abduction plane of motion in order to assist with work tasks.  4)   Assess  and pinch.   5)   Decrease edema at least 0.3 cm to increase joint mobility /flexibility for improved overall functional hand use.   6)   Pt will report  1 out of 10 pain with HEP for at least 3 consecutive sessions indicative of improved function participation in ADLs and IADLs.  7)   Patient to score at 70% or more on FOTO to demonstrate improved perception of functional UE use.  8)   Pt will return to near to prior level of function for ADLs and household management reporting I or Mod I with ADLs (dressing, feeding, grooming, toileting).         PLAN   Plan of Care Certification: 5/9/2024 to 7/19/2024.       Clark Block, OT

## 2024-06-27 ENCOUNTER — CLINICAL SUPPORT (OUTPATIENT)
Dept: REHABILITATION | Facility: HOSPITAL | Age: 54
End: 2024-06-27
Payer: COMMERCIAL

## 2024-06-27 DIAGNOSIS — M79.644 PAIN OF RIGHT THUMB: Primary | ICD-10-CM

## 2024-06-27 PROCEDURE — 97140 MANUAL THERAPY 1/> REGIONS: CPT | Mod: CO

## 2024-06-27 PROCEDURE — 97018 PARAFFIN BATH THERAPY: CPT | Mod: CO

## 2024-06-27 PROCEDURE — 97530 THERAPEUTIC ACTIVITIES: CPT | Mod: CO

## 2024-06-27 NOTE — PROGRESS NOTES
[facility]  Occupational Therapy Treatment Note    Date: 6/27/2024  Name: Jessica Nelson MD  Clinic Number: [id number]    Therapy Diagnosis:        Encounter Diagnosis   Name Primary?    Pain of right thumb Yes      Physician: Desean Keyes PA-C  Physician Orders: Custom orthosis and eval and treat     Medical Diagnosis: S/P UCL repair right thumb 4/5/24    Surgical Procedure and Date: 4/5/24  Evaluation Date: 5/9/2024  Insurance Authorization Period Expiration: 12/31/24  Plan of Care Certification Period: 7/19/24  Visit # / Visits authorized: 6/ 20  FOTO: Eval: 53%      Precautions:  Standard     Time In: 2:30 pm  Time Out: 3:15 pm  Total Appointment Time (timed & untimed codes): 45 minutes    SUBJECTIVE     Pt reports: She has been experiencing increased tightness in IP. She has noticed her writing grasp being a bit awkward    She was compliant with home exercise program given last session.   Response to previous treatment:more motion   Functional change: none     Pain: 0/10  Location: right thumb, stiffness      OBJECTIVE   RE-assess on 6/13 for MD visit same day and FOTO    Objective Measures updated at progress report unless specified.  Hand ROM. Measured in degrees.    5/9/2024     Right         Thumb: MP 0/30                IP 0/40       Rad ADD/ABD 45       Pal ADD/ABD 45          Opposition 0         Limitation/Restriction for FOTO Survey     Therapist reviewed FOTO scores for Jessica Nelson MD on 5/9/2024.   FOTO documents entered into [employer] - see Media section.  Functional Score: 53%         Treatment     Jessica received the treatments listed below:     Supervised modalities after being cleared for contradictions:   -Patient received paraffin bath to R hand(s) for 10 minutes to increase blood flow, circulation, pain management and for tissue elasticity prior to therex.       Therapeutic activities to improve functional performance for 25  minutes, including:  - AAROM: thumb MP  "flexion; thumb IP flexion; Radial abduction; palmar abduction x 10 with 5" hold each  - Wrist active range of motion with 1# (3-ways): 2 x 10 each   - Isospheres x 3'   - Yellow putty: gripping; pinch and roll; 3pt pinch; 2 pt pinch, key pinch  x 10 each   intrinsic cones; x 2' each (NT)   - Red chip clip with large pom poms x 2 containers each  -PHG with pegs x 20      Manual therapy techniques: for 10 min (including Joint mobilizations, Myofacial release, Manual Lymphatic Drainage, Soft tissue Mobilization, and Friction Massage)  were applied to the R thumb  as follows:   - STM to incision area and thenar pad  - scar massage to incision area to improve elasticity       Patient Education and Home Exercises      Education provided:   - Updated HEP  - Progress towards goals   - Yellow putty provided to patient    Written Home Exercises Provided: Patient instructed to cont prior HEP.  Exercises were reviewed and Jessica was able to demonstrate them prior to the end of the session.  Jessica demonstrated good  understanding of the HEP provided. See EMR under Patient Instructions for exercises provided during therapy sessions.       Assessment     Jessica is progressing well towards her goals and there are no updates to goals at this time. Tolerated today's session and completed tasks free of pain. C/o stiffness over proximal phalanx and into IP joint. Therapy to focus on strengthening, functional pinch, and end range of motion.     Pt will continue to benefit from skilled outpatient occupational therapy to address the deficits listed in the problem list on initial evaluation provide pt/family education and to maximize pt's level of independence in the home and community environment.     Pt's spiritual, cultural and educational needs considered and pt agreeable to plan of care and goals.    Anticipated barriers to occupational therapy: none     The following goals were discussed with the patient and patient is in agreement with " them as to be addressed in the treatment plan.      Goals:   1)   Patient to be IND with HEP and modalities for pain management  2)   Increase ROM 15 degrees in thumb MP flexion plane of motion to increase functional hand use for ADLs.   3)   Improve ROM at least 10 degrees in both palmar and radial abduction plane of motion in order to assist with work tasks.  4)   Assess  and pinch.   5)   Decrease edema at least 0.3 cm to increase joint mobility /flexibility for improved overall functional hand use.   6)   Pt will report  1 out of 10 pain with HEP for at least 3 consecutive sessions indicative of improved function participation in ADLs and IADLs.  7)   Patient to score at 70% or more on FOTO to demonstrate improved perception of functional UE use.  8)   Pt will return to near to prior level of function for ADLs and household management reporting I or Mod I with ADLs (dressing, feeding, grooming, toileting).         PLAN   Plan of Care Certification: 5/9/2024 to 7/19/2024.       JOANN Ibarra

## 2024-07-03 ENCOUNTER — CLINICAL SUPPORT (OUTPATIENT)
Dept: REHABILITATION | Facility: HOSPITAL | Age: 54
End: 2024-07-03
Payer: COMMERCIAL

## 2024-07-03 DIAGNOSIS — M79.644 PAIN OF RIGHT THUMB: Primary | ICD-10-CM

## 2024-07-03 PROCEDURE — 97530 THERAPEUTIC ACTIVITIES: CPT

## 2024-07-03 PROCEDURE — 97140 MANUAL THERAPY 1/> REGIONS: CPT

## 2024-07-03 PROCEDURE — 97018 PARAFFIN BATH THERAPY: CPT

## 2024-07-03 NOTE — PROGRESS NOTES
[facility]  Occupational Therapy Treatment Note    Date: 7/3/2024  Name: Jessica Nelson MD  Clinic Number: [id number]    Therapy Diagnosis:        Encounter Diagnosis   Name Primary?    Pain of right thumb Yes      Physician: Desean Keyes PA-C  Physician Orders: Custom orthosis and eval and treat     Medical Diagnosis: S/P UCL repair right thumb 4/5/24    Surgical Procedure and Date: 4/5/24  Evaluation Date: 5/9/2024  Insurance Authorization Period Expiration: 12/31/24  Plan of Care Certification Period: 7/19/24  Visit # / Visits authorized: 8/ 20  FOTO: Eval: 53%      Precautions:  Standard     Time In: 345 pm  Time Out: 430 pm  Total Appointment Time (timed & untimed codes): 45 minutes    SUBJECTIVE     Pt reports: Little sore with pinching last time, better with writing, the DIP is less tight.   She was compliant with home exercise program given last session.   Response to previous treatment:more motion   Functional change: none     Pain: 0/10  Location: right thumb, stiffness      OBJECTIVE       Objective Measures updated at progress report unless specified.  Hand ROM. Measured in degrees.    5/9/2024 7/3/24     Right RIGHT           Thumb: MP 0/30 0/45 (+15)                 IP 0/40 0/64 (+24)       Rad ADD/ABD 45        Pal ADD/ABD 45           Opposition 0 WFL        Baseline pinch 7/3/24  Key pinch R) 7  L) 12 PSI   3PT PINCH R) 5  L) 10.5 PSI   2 PT PINCH R) 5  L) 6.5 PSI   Limitation/Restriction for FOTO Survey     Therapist reviewed FOTO scores for Jessica Nelson MD on 5/9/2024.   FOTO documents entered into [employer] - see Media section.  Functional Score: 53%         Treatment     Jessica received the treatments listed below:     Supervised modalities after being cleared for contradictions:   -Patient received paraffin bath to R hand(s) for 10 minutes to increase blood flow, circulation, pain management and for tissue elasticity prior to therex.       Therapeutic activities to improve  "functional performance for 25  minutes, including:  - assessed pinch and ROM per above  - AAROM: thumb MP flexion; thumb IP flexion; Radial abduction; palmar abduction x 10 with 5" hold each  - Wrist active range of motion with 2# (3-ways): 1x 10 each   - Yellow putty: gripping; pinch and roll; 3pt pinch; 2 pt pinch, key pinch  x 10 each, added thumb adduction and ext/abd donut x 10 reps each   - green 4# clothespin with large pom poms x 2 containers each  yolie varigrip red 50% for thumb press in 2 positions x 10 reps each      Manual therapy techniques: for 10 min (including Joint mobilizations, Myofacial release, Manual Lymphatic Drainage, Soft tissue Mobilization, and Friction Massage)  were applied to the R thumb  as follows:   - STM to incision area and thenar pad  - scar massage to incision area to improve elasticity       Patient Education and Home Exercises      Education provided:   - Updated HEP  - Progress towards goals   - Yellow putty provided to patient    Written Home Exercises Provided: Patient instructed to cont prior HEP.  Exercises were reviewed and Jessica was able to demonstrate them prior to the end of the session.  Jessica demonstrated good  understanding of the HEP provided. See EMR under Patient Instructions for exercises provided during therapy sessions.       Assessment     Jessica is progressing well towards her goals and there are no updates to goals at this time. Tolerated today's session and completed tasks free of pain. ROM improving; pinch strength remains limited.  Therapy to focus on strengthening, functional pinch, and end range of motion.     Pt will continue to benefit from skilled outpatient occupational therapy to address the deficits listed in the problem list on initial evaluation provide pt/family education and to maximize pt's level of independence in the home and community environment.     Pt's spiritual, cultural and educational needs considered and pt agreeable to plan of care " and goals.    Anticipated barriers to occupational therapy: none     The following goals were discussed with the patient and patient is in agreement with them as to be addressed in the treatment plan.      Goals:   1)   Patient to be IND with HEP and modalities for pain management- met  2)   Increase ROM 15 degrees in thumb MP flexion plane of motion to increase functional hand use for ADLs. - met  3)   Improve ROM at least 10 degrees in both palmar and radial abduction plane of motion in order to assist with work tasks.- met  4)   Assess  and pinch. - pinch met  5)   Decrease edema at least 0.3 cm to increase joint mobility /flexibility for improved overall functional hand use.   6)   Pt will report  1 out of 10 pain with HEP for at least 3 consecutive sessions indicative of improved function participation in ADLs and IADLs.  7)   Patient to score at 70% or more on FOTO to demonstrate improved perception of functional UE use.  8)   Pt will return to near to prior level of function for ADLs and household management reporting I or Mod I with ADLs (dressing, feeding, grooming, toileting).         PLAN   Plan of Care Certification: 5/9/2024 to 7/19/2024.       Kiara Kerns OT CHT

## 2024-07-11 ENCOUNTER — CLINICAL SUPPORT (OUTPATIENT)
Dept: REHABILITATION | Facility: HOSPITAL | Age: 54
End: 2024-07-11
Payer: COMMERCIAL

## 2024-07-11 DIAGNOSIS — M79.644 PAIN OF RIGHT THUMB: Primary | ICD-10-CM

## 2024-07-11 PROCEDURE — 97018 PARAFFIN BATH THERAPY: CPT

## 2024-07-11 PROCEDURE — 97530 THERAPEUTIC ACTIVITIES: CPT

## 2024-07-11 NOTE — PROGRESS NOTES
[facility]  Occupational Therapy Treatment Note    Date: 7/11/2024  Name: Jessica Nelson MD  Clinic Number: [id number]    Therapy Diagnosis:        Encounter Diagnosis   Name Primary?    Pain of right thumb Yes      Physician: Desean Keyes PA-C  Physician Orders: Custom orthosis and eval and treat     Medical Diagnosis: S/P UCL repair right thumb 4/5/24    Surgical Procedure and Date: 4/5/24  Evaluation Date: 5/9/2024  Insurance Authorization Period Expiration: 12/31/24  Plan of Care Certification Period: 7/19/24  Visit # / Visits authorized: 8/ 20  FOTO: Eval: 53%      Precautions:  Standard     Time In: 950 am  Time Out: 10:30 am  Total Appointment Time (timed & untimed codes): 40 minutes    SUBJECTIVE     Pt reports: She has no pain. She has been consistent with her stretches and putty exercises. It feels tight during writing activities.   She was compliant with home exercise program given last session.   Response to previous treatment:more motion   Functional change: none     Pain: 0/10  Location: right thumb, stiffness      OBJECTIVE       Objective Measures updated at progress report unless specified.  Hand ROM. Measured in degrees.    5/9/2024 7/3/24     Right RIGHT           Thumb: MP 0/30 0/45 (+15)                 IP 0/40 0/64 (+24)       Rad ADD/ABD 45        Pal ADD/ABD 45           Opposition 0 WFL        Baseline pinch 7/3/24  Key pinch R) 7  L) 12 PSI   3PT PINCH R) 5  L) 10.5 PSI   2 PT PINCH R) 5  L) 6.5 PSI   Limitation/Restriction for FOTO Survey     Therapist reviewed FOTO scores for Jessica Nelson MD on 5/9/2024.   FOTO documents entered into [email or web address] - see Media section.  Functional Score: 53%         Treatment     Jessica received the treatments listed below:     Supervised modalities after being cleared for contradictions:   -Patient received paraffin bath to R hand(s) for 10 minutes to increase blood flow, circulation, pain management and for tissue elasticity prior to  "therex.       Therapeutic activities to improve functional performance for 35  minutes, including:    - AAROM: thumb MP flexion; thumb IP flexion; Radial abduction; palmar abduction x 10 with 5" hold each  - Wrist active range of motion with 2# (3-ways): 2 x 10 each   - Yellow putty: gripping; pinch and roll; 3pt pinch; 2 pt pinch, key pinch  x 10 each, added thumb adduction and ext/abd donut x 10 reps each (NT)  - green 4# clothespin with large pom poms x 2 containers each  - yolie varigrip red 75% for thumb press in 2 positions x 15 reps each   - EPL/APB stretch: 3 x 30"  - Writing rehearsal: Green putty and pencil x 2'  - Red Putty: jar opening with tools x 20 trials each way                       Intrinsic cones x 2'     Manual therapy techniques: for 0 min (including Joint mobilizations, Myofacial release, Manual Lymphatic Drainage, Soft tissue Mobilization, and Friction Massage)  were applied to the R thumb  as follows: (NT)  - STM to incision area and thenar pad  - scar massage to incision area to improve elasticity     Patient Education and Home Exercises      Education provided:   - Updated HEP  - Progress towards goals   - Red putty provided to patient    Written Home Exercises Provided: Patient instructed to cont prior HEP.  Exercises were reviewed and Jessica was able to demonstrate them prior to the end of the session.  Jessica demonstrated good  understanding of the HEP provided. See EMR under Patient Instructions for exercises provided during therapy sessions.       Assessment     Jessica is progressing well towards her goals and there are no updates to goals at this time. Tolerated today's session and completed tasks free of pain. ROM improving; pinch strength remains limited but functional with good endurance for all resistive activities.  Therapy to focus on strengthening, functional pinch, and end range of motion. The patient was sent with a scheduling slip and upgraded to red putty this date.     Pt will " continue to benefit from skilled outpatient occupational therapy to address the deficits listed in the problem list on initial evaluation provide pt/family education and to maximize pt's level of independence in the home and community environment.     Pt's spiritual, cultural and educational needs considered and pt agreeable to plan of care and goals.    Anticipated barriers to occupational therapy: none     The following goals were discussed with the patient and patient is in agreement with them as to be addressed in the treatment plan.      Goals:   1)   Patient to be IND with HEP and modalities for pain management- met  2)   Increase ROM 15 degrees in thumb MP flexion plane of motion to increase functional hand use for ADLs. - met  3)   Improve ROM at least 10 degrees in both palmar and radial abduction plane of motion in order to assist with work tasks.- met  4)   Assess  and pinch. - pinch met  5)   Decrease edema at least 0.3 cm to increase joint mobility /flexibility for improved overall functional hand use.   6)   Pt will report  1 out of 10 pain with HEP for at least 3 consecutive sessions indicative of improved function participation in ADLs and IADLs.  7)   Patient to score at 70% or more on FOTO to demonstrate improved perception of functional UE use.  8)   Pt will return to near to prior level of function for ADLs and household management reporting I or Mod I with ADLs (dressing, feeding, grooming, toileting).         PLAN   Plan of Care Certification: 5/9/2024 to 7/19/2024.       Clark Block OT CHT

## 2024-07-25 ENCOUNTER — CLINICAL SUPPORT (OUTPATIENT)
Dept: REHABILITATION | Facility: HOSPITAL | Age: 54
End: 2024-07-25
Payer: COMMERCIAL

## 2024-07-25 DIAGNOSIS — M79.644 PAIN OF RIGHT THUMB: Primary | ICD-10-CM

## 2024-07-25 PROCEDURE — 97530 THERAPEUTIC ACTIVITIES: CPT

## 2024-07-25 PROCEDURE — 97018 PARAFFIN BATH THERAPY: CPT

## 2024-07-25 NOTE — PROGRESS NOTES
[facility]  Occupational Therapy Treatment Note    Date: 7/25/2024  Name: Jessica Nelson MD  Clinic Number: [id number]    Therapy Diagnosis:        Encounter Diagnosis   Name Primary?    Pain of right thumb Yes      Physician: Desean Keyes PA-C  Physician Orders: Custom orthosis and eval and treat     Medical Diagnosis: S/P UCL repair right thumb 4/5/24    Surgical Procedure and Date: 4/5/24  Evaluation Date: 5/9/2024  Insurance Authorization Period Expiration: 12/31/24  Plan of Care Certification Period: 8/28/24  Visit # / Visits authorized: 9/ 20  FOTO: Eval: 53%              2/3 = 79%     Precautions:  Standard     Time In: 900 am  Time Out: 09:40 am  Total Appointment Time (timed & untimed codes): 40 minutes    SUBJECTIVE     Pt reports: She used a saw yesterday with no pain or decrease in function.   She was compliant with home exercise program given last session.   Response to previous treatment:more motion   Functional change: none     Pain: 0/10  Location: right thumb, stiffness      OBJECTIVE       Objective Measures updated at progress report unless specified.  Hand ROM. Measured in degrees.    5/9/2024 7/3/24     Right RIGHT           Thumb: MP 0/30 0/45 (+15)                 IP 0/40 0/64 (+24)       Rad ADD/ABD 45        Pal ADD/ABD 45           Opposition 0 WFL        Baseline pinch 7/3/24  Key pinch R) 7  L) 12 PSI   3PT PINCH R) 5  L) 10.5 PSI   2 PT PINCH R) 5  L) 6.5 PSI   Limitation/Restriction for FOTO Survey     Therapist reviewed FOTO scores for Jessica Nelson MD on 7/25/2024.   FOTO documents entered into [employer] - see Media section.  Functional Score: 79%         Treatment     Jessica received the treatments listed below:     Supervised modalities after being cleared for contradictions:   -Patient received paraffin bath to R hand(s) for 10 minutes to increase blood flow, circulation, pain management and for tissue elasticity prior to therex.       Therapeutic activities to  "improve functional performance for 25 minutes, including:    - AAROM: thumb MP flexion; thumb IP flexion; Radial abduction; palmar abduction x 10 with 5" hold each  - Wrist active range of motion with 2# (3-ways): 2 x 10 each   - Yellow putty: gripping; pinch and roll; 3pt pinch; 2 pt pinch, key pinch  x 10 each, added thumb adduction and ext/abd donut x 10 reps each (NT)  - green 4# clothespin with large pom poms x 2 containers each  - yolie varigrip green 25% for thumb press in 2 positions x 20 reps each   - EPL/APB stretch: 3 x 30"  - Writing rehearsal: Green putty and tissue  x 10 letters  - Red Putty: jar opening with tools x 20 trials each way                       Intrinsic cones x 2'     Manual therapy techniques: for 5 min (including Joint mobilizations, Myofacial release, Manual Lymphatic Drainage, Soft tissue Mobilization, and Friction Massage)  were applied to the R thumb  as follows: (NT)  - STM to the right thenar pad      Patient Education and Home Exercises      Education provided:   - Updated HEP  - Progress towards goals   - Red putty provided to patient    Written Home Exercises Provided: Patient instructed to cont prior HEP.  Exercises were reviewed and Jessica was able to demonstrate them prior to the end of the session.  Jessica demonstrated good  understanding of the HEP provided. See EMR under Patient Instructions for exercises provided during therapy sessions.       Assessment     Jessica is progressing well towards her goals and there are no updates to goals at this time. Tolerated today's session and completed tasks free of pain. ROM improving; pinch strength remains limited but functional with good endurance for all resistive activities.  Therapy to focus on strengthening, functional pinch, and end range of motion. The therapist is to re-assess objective measurements next session.    Pt will continue to benefit from skilled outpatient occupational therapy to address the deficits listed in the " problem list on initial evaluation provide pt/family education and to maximize pt's level of independence in the home and community environment.     Pt's spiritual, cultural and educational needs considered and pt agreeable to plan of care and goals.    Anticipated barriers to occupational therapy: none     The following goals were discussed with the patient and patient is in agreement with them as to be addressed in the treatment plan.      Goals:   1)   Patient to be IND with HEP and modalities for pain management- met  2)   Increase ROM 15 degrees in thumb MP flexion plane of motion to increase functional hand use for ADLs. - met  3)   Improve ROM at least 10 degrees in both palmar and radial abduction plane of motion in order to assist with work tasks.- met  4)   Assess  and pinch. - pinch met  5)   Decrease edema at least 0.3 cm to increase joint mobility /flexibility for improved overall functional hand use.   6)   Pt will report  1 out of 10 pain with HEP for at least 3 consecutive sessions indicative of improved function participation in ADLs and IADLs. MET  7)   Patient to score at 70% or more on FOTO to demonstrate improved perception of functional UE use. MET  8)   Pt will return to near to prior level of function for ADLs and household management reporting I or Mod I with ADLs (dressing, feeding, grooming, toileting). MET        PLAN   Plan of Care Certification: 5/9/2024 to 8/28/2024.       FANNY Badillo/SHA

## 2024-08-07 ENCOUNTER — CLINICAL SUPPORT (OUTPATIENT)
Dept: REHABILITATION | Facility: HOSPITAL | Age: 54
End: 2024-08-07
Payer: COMMERCIAL

## 2024-08-07 DIAGNOSIS — M79.644 PAIN OF RIGHT THUMB: Primary | ICD-10-CM

## 2024-08-07 PROCEDURE — 97018 PARAFFIN BATH THERAPY: CPT

## 2024-08-07 PROCEDURE — 97530 THERAPEUTIC ACTIVITIES: CPT

## 2024-08-07 PROCEDURE — 97140 MANUAL THERAPY 1/> REGIONS: CPT

## 2024-08-07 NOTE — PROGRESS NOTES
[facility]  Occupational Therapy Treatment Note    Date: 8/7/2024  Name: Jessica Nelson MD  Clinic Number: [id number]    Therapy Diagnosis:        Encounter Diagnosis   Name Primary?    Pain of right thumb Yes      Physician: Desean Keyes PA-C  Physician Orders: Custom orthosis and eval and treat     Medical Diagnosis: S/P UCL repair right thumb 4/5/24    Surgical Procedure and Date: 4/5/24  Evaluation Date: 5/9/2024  Insurance Authorization Period Expiration: 12/31/24  Plan of Care Certification Period: 8/28/24  Visit # / Visits authorized: 9/ 20  FOTO: Eval: 53%              2/3 = 79%   3/3 =     Precautions:  Standard     Time In: 4  Time Out: 440  Total Appointment Time (timed & untimed codes): 40 minutes    SUBJECTIVE     Pt reports:  I had pain, but it was my son's fault he grabbed my thumb in [employer] and squeezed it too hard   She was compliant with home exercise program given last session.   Response to previous treatment:more motion   Functional change: none     Pain: 0/10  Location: right thumb, stiffness      OBJECTIVE     Objective Measures updated at progress report unless specified.    Hand ROM. Measured in degrees.    5/9/2024 7/3/24     Right RIGHT           Thumb: MP 0/30 0/45 (+15)                 IP 0/40 0/64 (+24)       Rad ADD/ABD 45        Pal ADD/ABD 45           Opposition 0 WFL        Baseline pinch 7/3/24  Key pinch R) 7  L) 12 PSI   3PT PINCH R) 5  L) 10.5 PSI   2 PT PINCH R) 5  L) 6.5 PSI     Limitation/Restriction for FOTO Survey     Therapist reviewed FOTO scores for Jessica Nelson MD on 7/25/2024.   FOTO documents entered into [email or web address] - see Media section.  Functional Score: 79%         Treatment     Jessica received the treatments listed below:     Supervised modalities after being cleared for contradictions:   -Patient received paraffin bath to R hand(s) for 10 minutes to increase blood flow, circulation, pain management and for tissue elasticity prior to therex.   "     Therapeutic activities to improve functional performance for 20 minutes, including:  - AAROM: thumb MP flexion; thumb IP flexion; Radial abduction; palmar abduction x 10 with 5" hold each  - Wrist active range of motion with 2# (3-ways): 2 x 10 each   - Yellow putty: gripping; pinch and roll; 3pt pinch; 2 pt pinch, key pinch  x 10 each, added thumb adduction and ext/abd donut x 10 reps each   - green 4# clothespin with large pom poms x 2 containers each  - yolie varigrip green 25% for thumb press in 2 positions x 20 reps each      Manual therapy techniques: for 10 min (including Joint mobilizations, Myofacial release, Manual Lymphatic Drainage, Soft tissue Mobilization, and Friction Massage)  were applied to the R thumb  as follows:   - STM to the right thenar pad      Patient Education and Home Exercises      Education provided:   - Updated HEP  - Progress towards goals   - Red putty provided to patient    Written Home Exercises Provided: Patient instructed to cont prior HEP.  Exercises were reviewed and Jessica was able to demonstrate them prior to the end of the session.  Jessica demonstrated good  understanding of the HEP provided. See EMR under Patient Instructions for exercises provided during therapy sessions.       Assessment     Jessica is progressing well towards her goals and there are no updates to goals at this time. Tolerated today's session and completed tasks free of pain. ROM improving; pinch strength remains limited but functional with good endurance for all resistive activities.  Therapy to focus on strengthening, functional pinch, and end range of motion. The therapist is to re-assess objective measurements next session.    Pt will continue to benefit from skilled outpatient occupational therapy to address the deficits listed in the problem list on initial evaluation provide pt/family education and to maximize pt's level of independence in the home and community environment.     Pt's spiritual, " cultural and educational needs considered and pt agreeable to plan of care and goals.    Anticipated barriers to occupational therapy: none     The following goals were discussed with the patient and patient is in agreement with them as to be addressed in the treatment plan.      Goals:   1)   Patient to be IND with HEP and modalities for pain management- met  2)   Increase ROM 15 degrees in thumb MP flexion plane of motion to increase functional hand use for ADLs. - met  3)   Improve ROM at least 10 degrees in both palmar and radial abduction plane of motion in order to assist with work tasks.- met  4)   Assess  and pinch. - pinch met  5)   Decrease edema at least 0.3 cm to increase joint mobility /flexibility for improved overall functional hand use.   6)   Pt will report  1 out of 10 pain with HEP for at least 3 consecutive sessions indicative of improved function participation in ADLs and IADLs. MET  7)   Patient to score at 70% or more on FOTO to demonstrate improved perception of functional UE use. MET  8)   Pt will return to near to prior level of function for ADLs and household management reporting I or Mod I with ADLs (dressing, feeding, grooming, toileting). MET        PLAN   Plan of Care Certification: 5/9/2024 to 8/28/2024.       Kiara Kerns OTR/L

## 2024-08-13 DIAGNOSIS — F41.1 GAD (GENERALIZED ANXIETY DISORDER): ICD-10-CM

## 2024-08-13 RX ORDER — ESCITALOPRAM OXALATE 10 MG/1
10 TABLET ORAL
Qty: 90 TABLET | Refills: 3 | Status: SHIPPED | OUTPATIENT
Start: 2024-08-13

## 2024-08-13 NOTE — TELEPHONE ENCOUNTER
Care Due:                  Date            Visit Type   Department     Provider  --------------------------------------------------------------------------------                                EP -                              PRIMARY      [facility]  Last Visit: 05-      CARE ([facility])   Care           Angela Gerardo                              MYCHART                              ANNUAL                              CHECKUP/PHY  [facility]  Next Visit: 12-      S            Davis Gerardo                                                            Last  Test          Frequency    Reason                     Performed    Due Date  --------------------------------------------------------------------------------    Office Visit  15 months..  EScitalopram, carvediloL,   05- 07-                             levothyroxine............    TSH.........  12 months..  levothyroxine............  12-   12-    [facility] Embedded Care Due Messages. Reference number: [id number].   8/13/2024 12:49:03 AM CDT

## 2024-08-14 ENCOUNTER — CLINICAL SUPPORT (OUTPATIENT)
Dept: REHABILITATION | Facility: HOSPITAL | Age: 54
End: 2024-08-14
Payer: COMMERCIAL

## 2024-08-14 DIAGNOSIS — M79.644 PAIN OF RIGHT THUMB: Primary | ICD-10-CM

## 2024-08-14 PROCEDURE — 97018 PARAFFIN BATH THERAPY: CPT

## 2024-08-14 PROCEDURE — 97530 THERAPEUTIC ACTIVITIES: CPT

## 2024-08-14 PROCEDURE — 97140 MANUAL THERAPY 1/> REGIONS: CPT

## 2024-08-14 NOTE — PROGRESS NOTES
[facility]  Occupational Therapy Treatment Note and Discharge Summary     Date: 8/14/2024  Name: Jessica Nelson MD  Clinic Number: [id number]    Therapy Diagnosis:        Encounter Diagnosis   Name Primary?    Pain of right thumb Yes      Physician: Desean Keyes PA-C  Physician Orders: Custom orthosis and eval and treat     Medical Diagnosis: S/P UCL repair right thumb 4/5/24    Surgical Procedure and Date: 4/5/24  Evaluation Date: 5/9/2024  Insurance Authorization Period Expiration: 12/31/24  Plan of Care Certification Period: 8/28/24  Visit # / Visits authorized:10/ 20  FOTO: Eval: 53%              2/3 = 79%   3/3 =     Precautions:  Standard     Time In: 350  Time Out: 430  Total Appointment Time (timed & untimed codes): 40 minutes    SUBJECTIVE     Pt reports:  I think its doing fine.   She was compliant with home exercise program given last session.   Response to previous treatment:more motion   Functional change: none     Pain: 0/10  Location: right thumb, stiffness      OBJECTIVE     Objective Measures updated at progress report unless specified.    Discharge status as follows:   Hand ROM. Measured in degrees.    5/9/2024 7/3/24 8/14/24     Right RIGHT  Right           Thumb: MP 0/30 0/45 (+15)  50 (+5)                IP 0/40 0/64 (+24) 66(+2)       Rad ADD/ABD 45         Pal ADD/ABD 45            Opposition 0 WFL         Baseline pinch 7/3/24  8/14/24  Key pinch R) 7  L) 12 PSI  R)13 (+6)  3PT PINCH R) 5  L) 10.5 PSI  R) 10.5 (+5.5)  2 PT PINCH R) 5  L) 6.5 PSI  R) 8 (+3)    Limitation/Restriction for FOTO Survey     Therapist reviewed FOTO scores for Jessica Nelson MD on 7/25/2024.   FOTO documents entered into [employer] - see Media section.  Functional Score: 79%         Treatment     Jessica received the treatments listed below:     Supervised modalities after being cleared for contradictions:   -Patient received paraffin bath to R hand(s) for 10 minutes to increase blood flow,  "circulation, pain management and for tissue elasticity prior to therex.       Therapeutic activities to improve functional performance for 25 minutes, including:  - AAROM: thumb MP flexion; thumb IP flexion; Radial abduction; palmar abduction x 10 with 5" hold each  - Wrist active range of motion with 2# (3-ways): 2 x 10 each   - Yellow putty: gripping; pinch and roll; 3pt pinch; 2 pt pinch, key pinch  x 10 each, added thumb adduction and ext/abd donut x 10 reps each   - green 4# clothespin with large pom poms x 2 containers each  - yolie varigrip green 25% for thumb press in 2 positions x 20 reps each   - EPL/APB stretch: 3 x 30"     Manual therapy techniques: for 5 min (including Joint mobilizations, Myofacial release, Manual Lymphatic Drainage, Soft tissue Mobilization, and Friction Massage)  were applied to the R thumb  as follows: (NT)  - STM to the right thenar pad      Patient Education and Home Exercises      Education provided:   - Updated HEP  - Progress towards goals   - Red putty provided to patient    Written Home Exercises Provided: Patient instructed to cont prior HEP.  Exercises were reviewed and Jessica was able to demonstrate them prior to the end of the session.  Jessica demonstrated good  understanding of the HEP provided. See EMR under Patient Instructions for exercises provided during therapy sessions.       Assessment     Jessica is progressing well towards her goals and there are no updates to goals at this time. Tolerated today's session and completed tasks free of pain. ROM wnls and  pinch strength significantly improved. Will discharge this date with all goals met and patient in agreement.      Anticipated barriers to occupational therapy: none     The following goals were discussed with the patient and patient is in agreement with them as to be addressed in the treatment plan.      Goals:   1)   Patient to be IND with HEP and modalities for pain management- met  2)   Increase ROM 15 degrees in thumb " MP flexion plane of motion to increase functional hand use for ADLs. - met  3)   Improve ROM at least 10 degrees in both palmar and radial abduction plane of motion in order to assist with work tasks.- met  4)   Assess  and pinch. - pinch met  6)   Pt will report  1 out of 10 pain with HEP for at least 3 consecutive sessions indicative of improved function participation in ADLs and IADLs. MET  7)   Patient to score at 70% or more on FOTO to demonstrate improved perception of functional UE use. MET  8)   Pt will return to near to prior level of function for ADLs and household management reporting I or Mod I with ADLs (dressing, feeding, grooming, toileting). MET        PLAN   Will recommend discharge at this time with all goals met and patient in agreement.     Kiara Kerns, OTR/L, CHT

## 2024-11-22 ENCOUNTER — IMMUNIZATION (OUTPATIENT)
Dept: INTERNAL MEDICINE | Facility: CLINIC | Age: 54
End: 2024-11-22
Payer: COMMERCIAL

## 2024-11-22 DIAGNOSIS — Z23 NEED FOR VACCINATION: Primary | ICD-10-CM

## 2024-12-07 ENCOUNTER — PATIENT MESSAGE (OUTPATIENT)
Dept: PRIMARY CARE CLINIC | Facility: CLINIC | Age: 54
End: 2024-12-07
Payer: COMMERCIAL

## 2024-12-08 NOTE — TELEPHONE ENCOUNTER
4/12/2024 orders for Vit D, T4, TSH, Hep C, CBC, CMP, Urinalysis, A1c, and Lipid were placed    Annual scheduled for 12/12/2024    Are there any others that need to be ordered?

## 2024-12-10 ENCOUNTER — LAB VISIT (OUTPATIENT)
Dept: LAB | Facility: HOSPITAL | Age: 54
End: 2024-12-10
Attending: INTERNAL MEDICINE
Payer: COMMERCIAL

## 2024-12-10 DIAGNOSIS — E03.9 HYPOTHYROIDISM, UNSPECIFIED TYPE: ICD-10-CM

## 2024-12-10 DIAGNOSIS — Z00.00 PREVENTATIVE HEALTH CARE: ICD-10-CM

## 2024-12-10 LAB
25(OH)D3+25(OH)D2 SERPL-MCNC: 48 NG/ML (ref 30–96)
ALBUMIN SERPL BCP-MCNC: 4.4 G/DL (ref 3.5–5.2)
ALP SERPL-CCNC: 53 U/L (ref 40–150)
ALT SERPL W/O P-5'-P-CCNC: 20 U/L (ref 10–44)
ANION GAP SERPL CALC-SCNC: 10 MMOL/L (ref 8–16)
AST SERPL-CCNC: 25 U/L (ref 10–40)
BASOPHILS # BLD AUTO: 0.02 K/UL (ref 0–0.2)
BASOPHILS NFR BLD: 0.4 % (ref 0–1.9)
BILIRUB SERPL-MCNC: 0.5 MG/DL (ref 0.1–1)
BUN SERPL-MCNC: 20 MG/DL (ref 6–20)
CALCIUM SERPL-MCNC: 9.6 MG/DL (ref 8.7–10.5)
CHLORIDE SERPL-SCNC: 106 MMOL/L (ref 95–110)
CHOLEST SERPL-MCNC: 255 MG/DL (ref 120–199)
CHOLEST/HDLC SERPL: 3 {RATIO} (ref 2–5)
CO2 SERPL-SCNC: 26 MMOL/L (ref 23–29)
CREAT SERPL-MCNC: 1 MG/DL (ref 0.5–1.4)
DIFFERENTIAL METHOD BLD: NORMAL
EOSINOPHIL # BLD AUTO: 0.1 K/UL (ref 0–0.5)
EOSINOPHIL NFR BLD: 1.1 % (ref 0–8)
ERYTHROCYTE [DISTWIDTH] IN BLOOD BY AUTOMATED COUNT: 11.9 % (ref 11.5–14.5)
EST. GFR  (NO RACE VARIABLE): >60 ML/MIN/1.73 M^2
ESTIMATED AVG GLUCOSE: 100 MG/DL (ref 68–131)
GLUCOSE SERPL-MCNC: 90 MG/DL (ref 70–110)
HBA1C MFR BLD: 5.1 % (ref 4–5.6)
HCT VFR BLD AUTO: 37.4 % (ref 37–48.5)
HCV AB SERPL QL IA: NORMAL
HDLC SERPL-MCNC: 86 MG/DL (ref 40–75)
HDLC SERPL: 33.7 % (ref 20–50)
HGB BLD-MCNC: 12.4 G/DL (ref 12–16)
IMM GRANULOCYTES # BLD AUTO: 0 K/UL (ref 0–0.04)
IMM GRANULOCYTES NFR BLD AUTO: 0 % (ref 0–0.5)
LDLC SERPL CALC-MCNC: 150.4 MG/DL (ref 63–159)
LYMPHOCYTES # BLD AUTO: 1.9 K/UL (ref 1–4.8)
LYMPHOCYTES NFR BLD: 41.2 % (ref 18–48)
MCH RBC QN AUTO: 29.8 PG (ref 27–31)
MCHC RBC AUTO-ENTMCNC: 33.2 G/DL (ref 32–36)
MCV RBC AUTO: 90 FL (ref 82–98)
MONOCYTES # BLD AUTO: 0.4 K/UL (ref 0.3–1)
MONOCYTES NFR BLD: 8.6 % (ref 4–15)
NEUTROPHILS # BLD AUTO: 2.3 K/UL (ref 1.8–7.7)
NEUTROPHILS NFR BLD: 48.7 % (ref 38–73)
NONHDLC SERPL-MCNC: 169 MG/DL
NRBC BLD-RTO: 0 /100 WBC
PLATELET # BLD AUTO: 213 K/UL (ref 150–450)
PMV BLD AUTO: 11.1 FL (ref 9.2–12.9)
POTASSIUM SERPL-SCNC: 4.5 MMOL/L (ref 3.5–5.1)
PROT SERPL-MCNC: 7.4 G/DL (ref 6–8.4)
RBC # BLD AUTO: 4.16 M/UL (ref 4–5.4)
SODIUM SERPL-SCNC: 142 MMOL/L (ref 136–145)
TRIGL SERPL-MCNC: 93 MG/DL (ref 30–150)
WBC # BLD AUTO: 4.64 K/UL (ref 3.9–12.7)

## 2024-12-10 PROCEDURE — 36415 COLL VENOUS BLD VENIPUNCTURE: CPT | Performed by: INTERNAL MEDICINE

## 2024-12-10 PROCEDURE — 85025 COMPLETE CBC W/AUTO DIFF WBC: CPT | Performed by: INTERNAL MEDICINE

## 2024-12-10 PROCEDURE — 82306 VITAMIN D 25 HYDROXY: CPT | Performed by: INTERNAL MEDICINE

## 2024-12-10 PROCEDURE — 84443 ASSAY THYROID STIM HORMONE: CPT | Performed by: INTERNAL MEDICINE

## 2024-12-10 PROCEDURE — 86803 HEPATITIS C AB TEST: CPT | Performed by: INTERNAL MEDICINE

## 2024-12-10 PROCEDURE — 83036 HEMOGLOBIN GLYCOSYLATED A1C: CPT | Performed by: INTERNAL MEDICINE

## 2024-12-10 PROCEDURE — 80053 COMPREHEN METABOLIC PANEL: CPT | Performed by: INTERNAL MEDICINE

## 2024-12-10 PROCEDURE — 80061 LIPID PANEL: CPT | Performed by: INTERNAL MEDICINE

## 2024-12-10 PROCEDURE — 84439 ASSAY OF FREE THYROXINE: CPT | Performed by: INTERNAL MEDICINE

## 2024-12-10 NOTE — PROGRESS NOTES
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 Orellana in 11/2020and sclerotherapy  Dr. Plaza in 3/2021    Menopause Dec 2018: occasional hot flashes; intrarosa prn vaginal dryness     SH: primary care 80% main campus; no tobacco, drugs, alcohol - 1 beer 3-4 times per week;  15 y/o son jeanne Alexander; severe ADHD - going to [employer] in [facility] ; 20 y/o freshman at [employer]- studying Biology; field camp in [place]; ; walks with  30 min 5 times per week   FH: mom osteoporosis;  breast cancer 67 y/o, Parkinson's disease 79 y/o; dad HTN, HLD now 84 y/o; siblings fine     Past Medical History:  Past Medical History:   Diagnosis Date    Alopecia areata 1990    Resolved. Possibly secondary to CMV    Benign essential HTN 07/29/2015    Bilateral retinal lattice degeneration     Depression 11/29/2012    Dislocated thumb, right, sequela 12/12/2024    Essential hypertension 07/29/2015    Nodular fasciitis 2005    LUE    Pain of right thumb 05/13/2024    Rupture of UCL of right thumb 04/05/2024    Subclinical hypothyroidism 2012    Venous insufficiency        Past Surgical History:  Past Surgical History:   Procedure Laterality Date    COLONOSCOPY N/A 12/17/2020    Procedure: COLONOSCOPY;  Surgeon: Ida Donaldson MD;  Location: [address]);  Service: Endoscopy;  Laterality: N/A;  Per JOVANY Sorto, Pt having constipation and LLQ abdominal pain, diagnostic colonoscopy- [id number]/19/20@[id number]  COVID test on 12/14/20 at Primary Care -     Maxillofacial Surgery  1986    Malocclusion    Nodule Excision  2005     Benign Nodular Fasciitis LUE    REPAIR OF COLLATERAL LIGAMENT OF THUMB Right 4/5/2024    Procedure: RIGHT THUMB UCL REPAIR;  Surgeon: Megan Staples MD;  Location: [facility];  Service: Orthopedics;  Laterality: Right;  REGIONAL/MAC    RETINAL LASER PROCEDURE  2007    Lattice Degeneration Retina    TONSILLECTOMY         Allergies:  Review of patient's allergies indicates:   Allergen Reactions    Lisinopril Other (See Comments)     cough    Morphine      Other reaction(s): Vomiting  Other reaction(s): Nausea       Home Medications:  Current Outpatient Medications   Medication Sig Dispense Refill    carvediloL (COREG) 3.125 MG tablet Take 1 tablet (3.125 mg total) by mouth 2 (two) times daily with meals. Due for annual with PCP, labs 180 tablet 3    EScitalopram oxalate (LEXAPRO) 10 MG tablet TAKE 1 TABLET BY MOUTH ONCE  DAILY 90 tablet 3    levothyroxine (SYNTHROID) 75 MCG tablet Take 1 tablet (75 mcg total) by mouth before breakfast. 90 tablet 3    ondansetron (ZOFRAN-ODT) 4 MG TbDL Take 1 tablet (4 mg total) by mouth 2 (two) times daily. 10 tablet 0    calcium-D3-zinc-copper-elysia (CITRACAL-D3 MAXIMUM PLUS) 325 mg-12.5 mcg -2.75 mg Tab Take 1 tablet by mouth once daily.       Current Facility-Administered Medications   Medication Dose Route Frequency Provider Last Rate Last Admin    lidocaine HCL 10 mg/ml (1%) injection 1 mL  1 mL Other 1 time in Clinic/HOD DEX Plaza II, MD            Family History:  Family History   Problem Relation Name Age of Onset    Hypertension Mother      Hyperlipidemia Mother      Breast cancer Mother  67    Hypertension Father      Hyperlipidemia Father      Ovarian cancer Paternal Aunt      Thyroid disease Neg Hx      Amblyopia Neg Hx      Blindness Neg Hx      Cancer Neg Hx      Cataracts Neg Hx      Diabetes Neg Hx      Glaucoma Neg Hx      Macular degeneration Neg Hx      Retinal detachment Neg Hx      Strabismus Neg Hx      Stroke Neg Hx         Social History:  Social  "History     Tobacco Use    Smoking status: Never    Smokeless tobacco: Never   Substance Use Topics    Alcohol use: Yes     Comment: Rare    Drug use: No       Review of Systems:  Pertinent positives and negatives listed in HPI. All other systems are reviewed and are negative.    Health Maintaince :   Health Maintenance Topics with due status: Not Due       Topic Last Completion Date    TETANUS VACCINE 11/29/2016    Colorectal Cancer Screening 12/17/2020    Cervical Cancer Screening 04/07/2022    Lipid Panel 12/10/2024    RSV Vaccine (Age 60+ and Pregnant patients) Not Due           Eye: UTD - needs to schedule appt   Dental: gum recession; needs to schedule     Immunizations:   Tdap: 2016.  Influenza: UTD.  Pneumovax: n/a  Shingrex x 2: needs; had Shingles August 2020  COVID: needs booster   Hepatitis C:   Cancer Screening:  PAP: UTD 4/2022  Mammogram: UTD   Colonoscopy: 12/2020 good x 10 years  DEXA:  needs baseline  LDCT: n/a    The 10-year ASCVD risk score (Farida NUÑEZ, et al., 2019) is: 1.1%    Values used to calculate the score:      Age: 54 years      Sex: Female      Is Non- : No      Diabetic: No      Tobacco smoker: No      Systolic Blood Pressure: 122 mmHg      Is BP treated: No      HDL Cholesterol: 86 mg/dL      Total Cholesterol: 255 mg/dL      Objective:   /80 (BP Location: Left arm, Patient Position: Sitting)   Pulse (!) 51   Ht 5' 10" (1.778 m)   Wt 66.9 kg (147 lb 7.8 oz)   LMP 11/04/2019   SpO2 98%   BMI 21.16 kg/m²      Body mass index is 21.16 kg/m².       Physical Examination:  General: Alert and awake in no apparent distress  HEENT: Normocephalic and atraumatic; Tms WNL  Eyes:  PERRL; EOMi with anicteric sclera and clear conjunctivae  Mouth:  Oropharynx clear and without exudate; moist mucous membranes  Neck:   Cervical nodes not enlarged; supple; no bruits  Cardio:  Regular rate and rhythm with normal S1 and S2; no murmurs or rubs  Resp:  CTAB; respirations " unlabored; no wheezes, crackles or rhonchi  Abdom: Soft, NTND with normoactive bowel sounds; negative HSM  Extrem: Warm and well-perfused with no clubbing, cyanosis or edema  Skin:  No rashes, lesions, or color changes  Pulses:  2+ and symmetric distally  Neuro:  AAOx3; cooperative and pleasant with no focal deficits    Laboratory:      Most Recent Data:  Lab Results   Component Value Date    WBC 4.64 12/10/2024    HGB 12.4 12/10/2024    HCT 37.4 12/10/2024     12/10/2024    CHOL 255 (H) 12/10/2024    TRIG 93 12/10/2024    HDL 86 (H) 12/10/2024    ALT 20 12/10/2024    AST 25 12/10/2024     12/10/2024    K 4.5 12/10/2024     12/10/2024    BUN 20 12/10/2024    CO2 26 12/10/2024    TSH 2.155 12/10/2024    HGBA1C 5.1 12/10/2024              CBC:   WBC   Date Value Ref Range Status   12/10/2024 4.64 3.90 - 12.70 K/uL Final     Hemoglobin   Date Value Ref Range Status   12/10/2024 12.4 12.0 - 16.0 g/dL Final     Hematocrit   Date Value Ref Range Status   12/10/2024 37.4 37.0 - 48.5 % Final     Platelets   Date Value Ref Range Status   12/10/2024 213 150 - 450 K/uL Final     MCV   Date Value Ref Range Status   12/10/2024 90 82 - 98 fL Final     RDW   Date Value Ref Range Status   12/10/2024 11.9 11.5 - 14.5 % Final     BMP:   Sodium   Date Value Ref Range Status   12/10/2024 142 136 - 145 mmol/L Final     Potassium   Date Value Ref Range Status   12/10/2024 4.5 3.5 - 5.1 mmol/L Final     Chloride   Date Value Ref Range Status   12/10/2024 106 95 - 110 mmol/L Final     CO2   Date Value Ref Range Status   12/10/2024 26 23 - 29 mmol/L Final     BUN   Date Value Ref Range Status   12/10/2024 20 6 - 20 mg/dL Final     Creatinine   Date Value Ref Range Status   12/10/2024 1.0 0.5 - 1.4 mg/dL Final     Glucose   Date Value Ref Range Status   12/10/2024 90 70 - 110 mg/dL Final     Calcium   Date Value Ref Range Status   12/10/2024 9.6 8.7 - 10.5 mg/dL Final     Magnesium   Date Value Ref Range Status  "  07/29/2015 2.5 1.6 - 2.6 mg/dL Final     LFTs:   Total Protein   Date Value Ref Range Status   12/10/2024 7.4 6.0 - 8.4 g/dL Final     Albumin   Date Value Ref Range Status   12/10/2024 4.4 3.5 - 5.2 g/dL Final     Total Bilirubin   Date Value Ref Range Status   12/10/2024 0.5 0.1 - 1.0 mg/dL Final     Comment:     For infants and newborns, interpretation of results should be based  on gestational age, weight and in agreement with clinical  observations.    Premature Infant recommended reference ranges:  Up to 24 hours.............<8.0 mg/dL  Up to 48 hours............<12.0 mg/dL  3-5 days..................<15.0 mg/dL  6-29 days.................<15.0 mg/dL       AST   Date Value Ref Range Status   12/10/2024 25 10 - 40 U/L Final     Alkaline Phosphatase   Date Value Ref Range Status   12/10/2024 53 40 - 150 U/L Final     ALT   Date Value Ref Range Status   12/10/2024 20 10 - 44 U/L Final     Coags: No results found for: "INR", "PROTIME", "PTT"  FLP:      Lab Results   Component Value Date    CHOL 255 (H) 12/10/2024    CHOL 275 (H) 12/15/2022    CHOL 236 (H) 12/20/2021     Lab Results   Component Value Date    HDL 86 (H) 12/10/2024    HDL 98 (H) 12/15/2022    HDL 85 (H) 12/20/2021     Lab Results   Component Value Date    LDLCALC 150.4 12/10/2024    LDLCALC 162.8 (H) 12/15/2022    LDLCALC 139.6 12/20/2021     Lab Results   Component Value Date    TRIG 93 12/10/2024    TRIG 71 12/15/2022    TRIG 57 12/20/2021     Lab Results   Component Value Date    CHOLHDL 33.7 12/10/2024    CHOLHDL 35.6 12/15/2022    CHOLHDL 36.0 12/20/2021      DM:      Hemoglobin A1C   Date Value Ref Range Status   12/10/2024 5.1 4.0 - 5.6 % Final     Comment:     ADA Screening Guidelines:  5.7-6.4%  Consistent with prediabetes  >or=6.5%  Consistent with diabetes    High levels of fetal hemoglobin interfere with the HbA1C  assay. Heterozygous hemoglobin variants (HbS, HgC, etc)do  not significantly interfere with this assay.   However, presence " "of multiple variants may affect accuracy.       LDL Cholesterol   Date Value Ref Range Status   12/10/2024 150.4 63.0 - 159.0 mg/dL Final     Comment:     The National Cholesterol Education Program (NCEP) has set the  following guidelines (reference values) for LDL Cholesterol:  Optimal.......................<130 mg/dL  Borderline High...............130-159 mg/dL  High..........................160-189 mg/dL  Very High.....................>190 mg/dL       Creatinine   Date Value Ref Range Status   12/10/2024 1.0 0.5 - 1.4 mg/dL Final     Thyroid:   TSH   Date Value Ref Range Status   12/10/2024 2.155 0.400 - 4.000 uIU/mL Final     Free T4   Date Value Ref Range Status   12/10/2024 0.99 0.71 - 1.51 ng/dL Final     Anemia:   Iron   Date Value Ref Range Status   12/15/2022 125 30 - 160 ug/dL Final     TIBC   Date Value Ref Range Status   12/15/2022 363 250 - 450 ug/dL Final     Ferritin   Date Value Ref Range Status   12/15/2022 107 20.0 - 300.0 ng/mL Final     Vitamin B-12   Date Value Ref Range Status   11/12/2009 579 210 - 950 pg/ml Final     Cardiac: No results found for: "TROPONINI", "CKTOTAL", "CKMB", "BNP"  Urinalysis:   Urine Culture, Routine   Date Value Ref Range Status   02/09/2022   Final    Multiple organisms isolated. None in predominance.  Repeat if   02/09/2022 clinically necessary.  Final     Color, UA   Date Value Ref Range Status   12/10/2024 Yellow Yellow, Straw, Blanka Final     Specific Gravity, UA   Date Value Ref Range Status   12/10/2024 1.015 1.005 - 1.030 Final     Nitrite, UA   Date Value Ref Range Status   12/10/2024 Negative Negative Final     Ketones, UA   Date Value Ref Range Status   12/10/2024 Negative Negative Final     Urobilinogen, UA   Date Value Ref Range Status   12/01/2016 Negative <2.0 EU/dL Final     WBC, UA   Date Value Ref Range Status   02/09/2022 49 (H) 0 - 5 /hpf Final       Other Results:  EKG (my interpretation):     Radiology:  MRI Hand Fingers Without Contrast " Right  Narrative: EXAMINATION:  MRI HAND FINGERS WITHOUT CONTRAST RIGHT    CLINICAL HISTORY:  Soft tissue mass, hand, US/xray nondiagnostic;ligament injury;  Localized swelling, mass and lump, right upper limb    TECHNIQUE:  Multiple multiplanar sequences of the right thumb without contrast.    COMPARISON:  X-ray 02/23/2024    FINDINGS:  LIGAMENTS: There is complete tear of the distal attachment of the ulnar collateral ligament with proximal retraction and a rounded masslike appearance which is partially superficial to the abductor aponeurosis compatible with Stener lesion.  The radial collateral    TENDONS: Flexor and extensor tendons of the hand and fingers are unremarkable.    MUSCLES: Normal bulk and signal.    CARTILAGE: Articular cartilage is maintained.    BONES: There is an osteochondral injury of the distal attachment of the UCL at the base of the 1st proximal phalanx.  No displaced fracture fragments.    SOFT TISSUES: Periarticular soft tissue edema is present and there is a reactive effusion of the 1st MCP joint.  Impression: Stener lesion the ulnar collateral ligament of the thumb with associated osteochondral contusion at the site of the tear.    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 Preventative health care  Overview:  CT coronary artery score: for risk stratification = 0      Assessment & Plan:  Schedule MMG  Schedule DEXA 7/2025  Schedule appt for eye exam   Get Shingrex and COVID   Cont to increase frequency of exercise to help with lowering LDL         2. Encounter for screening for malignant neoplasm of breast, unspecified screening modality    3. Venous insufficiency of both lower extremities  Overview:  ESVL bilaterally by Dr. Orellana in 11/2020and sclerotherapy  Dr. Plaza in 3/2021    Assessment & Plan:  -cont compression socks and elevate legs      4. Osteopenia,  unspecified location  Overview:  7/2023:  Lumbar spine (L1-L4):               BMD is 0.903 g/cm2, T-score is -1.3, and Z-score is -0.5.     Total hip:                                BMD is 0.809 g/cm2, T-score is -1.1, and Z-score is -0.6.     Femoral neck:                          BMD is 0.647 g/cm2, T-score is -1.8, and Z-score is -1.0.      Vit D level 48 12/2024       Assessment & Plan:  Cont Vit D 2000 IU daily   Start Citracal max plus     Orders:  -     DXA Bone Density Axial Skeleton 1 or more sites; Future; Expected date: 07/27/2025    5. Family history of breast cancer  Assessment & Plan:  Schedule MMG in 2/2025      6. CLARISSA (generalized anxiety disorder)  Assessment & Plan:  -cont lexapro 10mg daily   -Cont Coreg 3.125 mg qhs         7. Bilateral retinal lattice degeneration  Overview:  S/P Laser Rx 2007    Assessment & Plan:  Needs to schedule eye appt with Dr. Mayfield                 I spent a total of 40 minutes on the day of the visit.This includes face to face time and non-face to face time preparing to see the patient (eg, review of tests), obtaining and/or reviewing separately obtained history, documenting clinical information in the electronic or other health record, independently interpreting results and communicating results to the patient/family/caregiver, or care coordinator.   Code Status:     Angela Gerardo MD

## 2024-12-11 LAB
T4 FREE SERPL-MCNC: 0.99 NG/DL (ref 0.71–1.51)
TSH SERPL DL<=0.005 MIU/L-ACNC: 2.15 UIU/ML (ref 0.4–4)

## 2024-12-12 ENCOUNTER — OFFICE VISIT (OUTPATIENT)
Dept: PRIMARY CARE CLINIC | Facility: CLINIC | Age: 54
End: 2024-12-12
Payer: COMMERCIAL

## 2024-12-12 VITALS
WEIGHT: 147.5 LBS | OXYGEN SATURATION: 98 % | BODY MASS INDEX: 21.11 KG/M2 | HEART RATE: 51 BPM | HEIGHT: 70 IN | DIASTOLIC BLOOD PRESSURE: 80 MMHG | SYSTOLIC BLOOD PRESSURE: 122 MMHG

## 2024-12-12 DIAGNOSIS — Z80.3 FAMILY HISTORY OF BREAST CANCER: ICD-10-CM

## 2024-12-12 DIAGNOSIS — Z00.00 PREVENTATIVE HEALTH CARE: Primary | ICD-10-CM

## 2024-12-12 DIAGNOSIS — I87.2 VENOUS INSUFFICIENCY OF BOTH LOWER EXTREMITIES: ICD-10-CM

## 2024-12-12 DIAGNOSIS — H35.413 BILATERAL RETINAL LATTICE DEGENERATION: ICD-10-CM

## 2024-12-12 DIAGNOSIS — M85.80 OSTEOPENIA, UNSPECIFIED LOCATION: ICD-10-CM

## 2024-12-12 DIAGNOSIS — Z12.39 ENCOUNTER FOR SCREENING FOR MALIGNANT NEOPLASM OF BREAST, UNSPECIFIED SCREENING MODALITY: ICD-10-CM

## 2024-12-12 DIAGNOSIS — F41.1 GAD (GENERALIZED ANXIETY DISORDER): ICD-10-CM

## 2024-12-12 PROBLEM — S63.641A RUPTURE OF UCL OF RIGHT THUMB: Status: RESOLVED | Noted: 2024-04-05 | Resolved: 2024-12-12

## 2024-12-12 PROBLEM — M79.644 PAIN OF RIGHT THUMB: Status: RESOLVED | Noted: 2024-05-13 | Resolved: 2024-12-12

## 2024-12-12 PROBLEM — S63.104S: Status: ACTIVE | Noted: 2024-12-12

## 2024-12-12 PROBLEM — S63.104S: Status: RESOLVED | Noted: 2024-12-12 | Resolved: 2024-12-12

## 2024-12-12 PROCEDURE — 99999 PR PBB SHADOW E&M-EST. PATIENT-LVL IV: CPT | Mod: PBBFAC,,, | Performed by: INTERNAL MEDICINE

## 2024-12-12 PROCEDURE — 3008F BODY MASS INDEX DOCD: CPT | Mod: CPTII,S$GLB,, | Performed by: INTERNAL MEDICINE

## 2024-12-12 PROCEDURE — 1159F MED LIST DOCD IN RCRD: CPT | Mod: CPTII,S$GLB,, | Performed by: INTERNAL MEDICINE

## 2024-12-12 PROCEDURE — 99396 PREV VISIT EST AGE 40-64: CPT | Mod: S$GLB,,, | Performed by: INTERNAL MEDICINE

## 2024-12-12 PROCEDURE — 3074F SYST BP LT 130 MM HG: CPT | Mod: CPTII,S$GLB,, | Performed by: INTERNAL MEDICINE

## 2024-12-12 PROCEDURE — 3044F HG A1C LEVEL LT 7.0%: CPT | Mod: CPTII,S$GLB,, | Performed by: INTERNAL MEDICINE

## 2024-12-12 PROCEDURE — 3079F DIAST BP 80-89 MM HG: CPT | Mod: CPTII,S$GLB,, | Performed by: INTERNAL MEDICINE

## 2024-12-12 RX ORDER — ADHESIVE BANDAGE 7/8"
1 BANDAGE TOPICAL DAILY
COMMUNITY

## 2024-12-12 NOTE — ASSESSMENT & PLAN NOTE
Schedule MMG  Schedule DEXA 7/2025  Schedule appt for eye exam   Get Shingrex and COVID   Cont to increase frequency of exercise to help with lowering LDL

## 2024-12-12 NOTE — PATIENT INSTRUCTIONS
Encounter for screening for malignant neoplasm of breast, unspecified screening modality    Venous insufficiency of both lower extremities  Assessment & Plan:  -cont compression socks and elevate legs      Dislocated thumb, right, sequela  Assessment & Plan:  S/p repair by Dr. Wright-Johan       Hypothyroidism, unspecified type  Assessment & Plan:  Takes Levo 75mcg po daily except 1/2 tab po MWF      Osteopenia, unspecified location  Assessment & Plan:  Takes Vit D 2000 IU daily   Start Citracal max plus     Orders:  -     DXA Bone Density Axial Skeleton 1 or more sites; Future; Expected date: 07/27/2025    Family history of breast cancer  Assessment & Plan:  Schedule MMG in 2/2025      Preventative health care  Assessment & Plan:  Schedule MMG  Schedule DEXA 7/2025  Schedule appt for eye exam   Get Shingrex and COVID       CLARISSA (generalized anxiety disorder)  Assessment & Plan:  -cont lexapro 10mg daily   -Cont Coreg 3.125 mg qhs

## 2024-12-12 NOTE — PROGRESS NOTES
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 Orellana in 2020and sclerotherapy  Dr. Plaza in 3/2021; wears compression socks and elevates legs    Osteopenia: takes Vit D 2000 IU daily and Tums; Vit D level 48    Menopause Dec 2018: occasional hot flashes; intrarosa prn vaginal dryness     SH: primary care 80% main campus; no tobacco, drugs, alcohol - 1 beer 3-4 times per week;  15 y/o son senior Catherine - plans to do Engineering; severe ADHD - going to [employer] in [place] ; 22 y/o Monico at [employer]- studying Biology; ; walks with  1-2 hours on the weekend; 30 min 5 times per week; [facility]   FH: mom osteoporosis;  breast cancer 67 y/o  of complications of Parkinson's disease at 79 y/o; dad HTN, HLD now 84 y/o - independent with support from children; siblings fine; CAD neg     Past Medical History:  Past Medical History:   Diagnosis Date    Alopecia areata     Resolved. Possibly secondary to CMV    Benign essential HTN 2015    Bilateral retinal lattice degeneration     Depression 2012    Dislocated thumb, right, sequela 2024    Essential hypertension 2015    Nodular fasciitis 2005    LUE    Pain of right thumb 2024    Rupture of UCL of right thumb 2024    Subclinical hypothyroidism 2012    Venous insufficiency        Past Surgical History:  Past Surgical History:   Procedure Laterality Date    COLONOSCOPY N/A 2020    Procedure: COLONOSCOPY;  Surgeon: Ida Donaldson MD;  Location: [facility] ([address] [place] [address]);  Service: Endoscopy;   Laterality: N/A;  Per JOVANY Sorto, Pt having constipation and LLQ abdominal pain, diagnostic colonoscopy- ERW11/19/20@0934  COVID test on 12/14/20 at Primary Care -     Maxillofacial Surgery  1986    Malocclusion    Nodule Excision  2005    Benign Nodular Fasciitis LUE    REPAIR OF COLLATERAL LIGAMENT OF THUMB Right 4/5/2024    Procedure: RIGHT THUMB UCL REPAIR;  Surgeon: Megan Staples MD;  Location: [facility];  Service: Orthopedics;  Laterality: Right;  REGIONAL/MAC    RETINAL LASER PROCEDURE  2007    Lattice Degeneration Retina    TONSILLECTOMY         Allergies:  Review of patient's allergies indicates:   Allergen Reactions    Lisinopril Other (See Comments)     cough    Morphine      Other reaction(s): Vomiting  Other reaction(s): Nausea       Home Medications:  Current Outpatient Medications   Medication Sig Dispense Refill    carvediloL (COREG) 3.125 MG tablet Take 1 tablet (3.125 mg total) by mouth 2 (two) times daily with meals. Due for annual with PCP, labs 180 tablet 3    EScitalopram oxalate (LEXAPRO) 10 MG tablet TAKE 1 TABLET BY MOUTH ONCE  DAILY 90 tablet 3    levothyroxine (SYNTHROID) 75 MCG tablet Take 1 tablet (75 mcg total) by mouth before breakfast. 90 tablet 3    ondansetron (ZOFRAN-ODT) 4 MG TbDL Take 1 tablet (4 mg total) by mouth 2 (two) times daily. 10 tablet 0    calcium-D3-zinc-copper-elysia (CITRACAL-D3 MAXIMUM PLUS) 325 mg-12.5 mcg -2.75 mg Tab Take 1 tablet by mouth once daily.       Current Facility-Administered Medications   Medication Dose Route Frequency Provider Last Rate Last Admin    lidocaine HCL 10 mg/ml (1%) injection 1 mL  1 mL Other 1 time in Clinic/HOD DEX Plaza II, MD            Family History:  Family History   Problem Relation Name Age of Onset    Hypertension Mother      Hyperlipidemia Mother      Breast cancer Mother  67    Hypertension Father      Hyperlipidemia Father      Ovarian cancer Paternal Aunt      Thyroid disease Neg Hx       Amblyopia Neg Hx      Blindness Neg Hx      Cancer Neg Hx      Cataracts Neg Hx      Diabetes Neg Hx      Glaucoma Neg Hx      Macular degeneration Neg Hx      Retinal detachment Neg Hx      Strabismus Neg Hx      Stroke Neg Hx         Social History:  Social History     Tobacco Use    Smoking status: Never    Smokeless tobacco: Never   Substance Use Topics    Alcohol use: Yes     Comment: Rare    Drug use: No       Review of Systems:  Pertinent positives and negatives listed in HPI. All other systems are reviewed and are negative.    Health Maintaince :   Health Maintenance Topics with due status: Not Due       Topic Last Completion Date    TETANUS VACCINE 11/29/2016    Colorectal Cancer Screening 12/17/2020    Cervical Cancer Screening 04/07/2022    Lipid Panel 12/10/2024    RSV Vaccine (Age 60+ and Pregnant patients) Not Due           Eye: UTD - needs to schedule appt Dr. Mayfield   Dental: gum recession; needs to schedule     Immunizations:   Tdap: 2016.  Influenza: UTD.  Pneumovax: n/a  Shingrex x 2: needs; had Shingles August 2020  COVID: needs booster   Hepatitis C:   Cancer Screening:  PAP: UTD 4/2022  Mammogram: UTD 2/2024  Colonoscopy: 12/2020 good x 10 years  DEXA: Lumbar spine (L1-L4):               BMD is 0.903 g/cm2, T-score is -1.3, and Z-score is -0.5.     Total hip:                                BMD is 0.809 g/cm2, T-score is -1.1, and Z-score is -0.6.     Femoral neck:                          BMD is 0.647 g/cm2, T-score is -1.8, and Z-score is -1.0.     LDCT: n/a    The 10-year ASCVD risk score (Farida NUÑEZ, et al., 2019) is: 1.1%    Values used to calculate the score:      Age: 54 years      Sex: Female      Is Non- : No      Diabetic: No      Tobacco smoker: No      Systolic Blood Pressure: 122 mmHg      Is BP treated: No      HDL Cholesterol: 86 mg/dL      Total Cholesterol: 255 mg/dL      Objective:   /80 (BP Location: Left arm, Patient Position: Sitting)   Pulse  "(!) 51   Ht 5' 10" (1.778 m)   Wt 66.9 kg (147 lb 7.8 oz)   LMP 11/04/2019   SpO2 98%   BMI 21.16 kg/m²      Body mass index is 21.16 kg/m².       Physical Examination:  General: Alert and awake in no apparent distress  HEENT: Normocephalic and atraumatic; Tms WNL  Eyes:  PERRL; EOMi with anicteric sclera and clear conjunctivae  Mouth:  Oropharynx clear and without exudate; moist mucous membranes  Neck:   Cervical nodes not enlarged; supple; no bruits  Cardio:  Regular rate and rhythm with normal S1 and S2; no murmurs or rubs  Resp:  CTAB; respirations unlabored; no wheezes, crackles or rhonchi  Abdom: Soft, NTND with normoactive bowel sounds; negative HSM  Extrem: Warm and well-perfused with no clubbing, cyanosis or edema  Skin:  No rashes, lesions, or color changes  Pulses:  2+ and symmetric distally  Neuro:  AAOx3; cooperative and pleasant with no focal deficits    Laboratory:      Most Recent Data:  Lab Results   Component Value Date    WBC 4.64 12/10/2024    HGB 12.4 12/10/2024    HCT 37.4 12/10/2024     12/10/2024    CHOL 255 (H) 12/10/2024    TRIG 93 12/10/2024    HDL 86 (H) 12/10/2024    ALT 20 12/10/2024    AST 25 12/10/2024     12/10/2024    K 4.5 12/10/2024     12/10/2024    BUN 20 12/10/2024    CO2 26 12/10/2024    TSH 2.155 12/10/2024    HGBA1C 5.1 12/10/2024              CBC:   WBC   Date Value Ref Range Status   12/10/2024 4.64 3.90 - 12.70 K/uL Final     Hemoglobin   Date Value Ref Range Status   12/10/2024 12.4 12.0 - 16.0 g/dL Final     Hematocrit   Date Value Ref Range Status   12/10/2024 37.4 37.0 - 48.5 % Final     Platelets   Date Value Ref Range Status   12/10/2024 213 150 - 450 K/uL Final     MCV   Date Value Ref Range Status   12/10/2024 90 82 - 98 fL Final     RDW   Date Value Ref Range Status   12/10/2024 11.9 11.5 - 14.5 % Final     BMP:   Sodium   Date Value Ref Range Status   12/10/2024 142 136 - 145 mmol/L Final     Potassium   Date Value Ref Range Status " "  12/10/2024 4.5 3.5 - 5.1 mmol/L Final     Chloride   Date Value Ref Range Status   12/10/2024 106 95 - 110 mmol/L Final     CO2   Date Value Ref Range Status   12/10/2024 26 23 - 29 mmol/L Final     BUN   Date Value Ref Range Status   12/10/2024 20 6 - 20 mg/dL Final     Creatinine   Date Value Ref Range Status   12/10/2024 1.0 0.5 - 1.4 mg/dL Final     Glucose   Date Value Ref Range Status   12/10/2024 90 70 - 110 mg/dL Final     Calcium   Date Value Ref Range Status   12/10/2024 9.6 8.7 - 10.5 mg/dL Final     Magnesium   Date Value Ref Range Status   07/29/2015 2.5 1.6 - 2.6 mg/dL Final     LFTs:   Total Protein   Date Value Ref Range Status   12/10/2024 7.4 6.0 - 8.4 g/dL Final     Albumin   Date Value Ref Range Status   12/10/2024 4.4 3.5 - 5.2 g/dL Final     Total Bilirubin   Date Value Ref Range Status   12/10/2024 0.5 0.1 - 1.0 mg/dL Final     Comment:     For infants and newborns, interpretation of results should be based  on gestational age, weight and in agreement with clinical  observations.    Premature Infant recommended reference ranges:  Up to 24 hours.............<8.0 mg/dL  Up to 48 hours............<12.0 mg/dL  3-5 days..................<15.0 mg/dL  6-29 days.................<15.0 mg/dL       AST   Date Value Ref Range Status   12/10/2024 25 10 - 40 U/L Final     Alkaline Phosphatase   Date Value Ref Range Status   12/10/2024 53 40 - 150 U/L Final     ALT   Date Value Ref Range Status   12/10/2024 20 10 - 44 U/L Final     Coags: No results found for: "INR", "PROTIME", "PTT"  FLP:      Lab Results   Component Value Date    CHOL 255 (H) 12/10/2024    CHOL 275 (H) 12/15/2022    CHOL 236 (H) 12/20/2021     Lab Results   Component Value Date    HDL 86 (H) 12/10/2024    HDL 98 (H) 12/15/2022    HDL 85 (H) 12/20/2021     Lab Results   Component Value Date    LDLCALC 150.4 12/10/2024    LDLCALC 162.8 (H) 12/15/2022    LDLCALC 139.6 12/20/2021     Lab Results   Component Value Date    TRIG 93 12/10/2024 " "   TRIG 71 12/15/2022    TRIG 57 12/20/2021     Lab Results   Component Value Date    CHOLHDL 33.7 12/10/2024    CHOLHDL 35.6 12/15/2022    CHOLHDL 36.0 12/20/2021      DM:      Hemoglobin A1C   Date Value Ref Range Status   12/10/2024 5.1 4.0 - 5.6 % Final     Comment:     ADA Screening Guidelines:  5.7-6.4%  Consistent with prediabetes  >or=6.5%  Consistent with diabetes    High levels of fetal hemoglobin interfere with the HbA1C  assay. Heterozygous hemoglobin variants (HbS, HgC, etc)do  not significantly interfere with this assay.   However, presence of multiple variants may affect accuracy.       LDL Cholesterol   Date Value Ref Range Status   12/10/2024 150.4 63.0 - 159.0 mg/dL Final     Comment:     The National Cholesterol Education Program (NCEP) has set the  following guidelines (reference values) for LDL Cholesterol:  Optimal.......................<130 mg/dL  Borderline High...............130-159 mg/dL  High..........................160-189 mg/dL  Very High.....................>190 mg/dL       Creatinine   Date Value Ref Range Status   12/10/2024 1.0 0.5 - 1.4 mg/dL Final     Thyroid:   TSH   Date Value Ref Range Status   12/10/2024 2.155 0.400 - 4.000 uIU/mL Final     Free T4   Date Value Ref Range Status   12/10/2024 0.99 0.71 - 1.51 ng/dL Final     Anemia:   Iron   Date Value Ref Range Status   12/15/2022 125 30 - 160 ug/dL Final     TIBC   Date Value Ref Range Status   12/15/2022 363 250 - 450 ug/dL Final     Ferritin   Date Value Ref Range Status   12/15/2022 107 20.0 - 300.0 ng/mL Final     Vitamin B-12   Date Value Ref Range Status   11/12/2009 579 210 - 950 pg/ml Final     Cardiac: No results found for: "TROPONINI", "CKTOTAL", "CKMB", "BNP"  Urinalysis:   Urine Culture, Routine   Date Value Ref Range Status   02/09/2022   Final    Multiple organisms isolated. None in predominance.  Repeat if   02/09/2022 clinically necessary.  Final     Color, UA   Date Value Ref Range Status   12/10/2024 Yellow " Yellow, Straw, Blanka Final     Specific Gravity, UA   Date Value Ref Range Status   12/10/2024 1.015 1.005 - 1.030 Final     Nitrite, UA   Date Value Ref Range Status   12/10/2024 Negative Negative Final     Ketones, UA   Date Value Ref Range Status   12/10/2024 Negative Negative Final     Urobilinogen, UA   Date Value Ref Range Status   12/01/2016 Negative <2.0 EU/dL Final     WBC, UA   Date Value Ref Range Status   02/09/2022 49 (H) 0 - 5 /hpf Final       Other Results:  EKG (my interpretation):     Radiology:  MRI Hand Fingers Without Contrast Right  Narrative: EXAMINATION:  MRI HAND FINGERS WITHOUT CONTRAST RIGHT    CLINICAL HISTORY:  Soft tissue mass, hand, US/xray nondiagnostic;ligament injury;  Localized swelling, mass and lump, right upper limb    TECHNIQUE:  Multiple multiplanar sequences of the right thumb without contrast.    COMPARISON:  X-ray 02/23/2024    FINDINGS:  LIGAMENTS: There is complete tear of the distal attachment of the ulnar collateral ligament with proximal retraction and a rounded masslike appearance which is partially superficial to the abductor aponeurosis compatible with Stener lesion.  The radial collateral    TENDONS: Flexor and extensor tendons of the hand and fingers are unremarkable.    MUSCLES: Normal bulk and signal.    CARTILAGE: Articular cartilage is maintained.    BONES: There is an osteochondral injury of the distal attachment of the UCL at the base of the 1st proximal phalanx.  No displaced fracture fragments.    SOFT TISSUES: Periarticular soft tissue edema is present and there is a reactive effusion of the 1st MCP joint.  Impression: Stener lesion the ulnar collateral ligament of the thumb with associated osteochondral contusion at the site of the tear.    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 Preventative health care  Overview:  CT coronary artery  score: for risk stratification = 0      Assessment & Plan:  Schedule MMG  Schedule DEXA 7/2025  Schedule appt for eye exam   Get Shingrex and COVID   Cont to increase frequency of exercise to help with lowering LDL         2. Encounter for screening for malignant neoplasm of breast, unspecified screening modality    3. Venous insufficiency of both lower extremities  Overview:  ESVL bilaterally by Dr. Orellana in 11/2020and sclerotherapy  Dr. Plaza in 3/2021    Assessment & Plan:  -cont compression socks and elevate legs      4. Osteopenia, unspecified location  Overview:  7/2023:  Lumbar spine (L1-L4):               BMD is 0.903 g/cm2, T-score is -1.3, and Z-score is -0.5.     Total hip:                                BMD is 0.809 g/cm2, T-score is -1.1, and Z-score is -0.6.     Femoral neck:                          BMD is 0.647 g/cm2, T-score is -1.8, and Z-score is -1.0.      Vit D level 48 12/2024       Assessment & Plan:  Cont Vit D 2000 IU daily   Start Citracal max plus     Orders:  -     DXA Bone Density Axial Skeleton 1 or more sites; Future; Expected date: 07/27/2025    5. Family history of breast cancer  Assessment & Plan:  Schedule MMG in 2/2025      6. CLARISSA (generalized anxiety disorder)  Assessment & Plan:  -cont lexapro 10mg daily   -Cont Coreg 3.125 mg qhs         7. Bilateral retinal lattice degeneration  Overview:  S/P Laser Rx 2007    Assessment & Plan:  Needs to schedule eye appt with Dr. Mayfield                 I spent a total of 30 minutes on the day of the visit.This includes face to face time and non-face to face time preparing to see the patient (eg, review of tests), obtaining and/or reviewing separately obtained history, documenting clinical information in the electronic or other health record, independently interpreting results and communicating results to the patient/family/caregiver, or care coordinator.   Code Status:     Angela Gerardo MD

## 2025-02-12 DIAGNOSIS — Z12.31 OTHER SCREENING MAMMOGRAM: ICD-10-CM

## 2025-03-16 DIAGNOSIS — E03.9 HYPOTHYROIDISM, UNSPECIFIED TYPE: ICD-10-CM

## 2025-03-17 RX ORDER — LEVOTHYROXINE SODIUM 75 UG/1
75 TABLET ORAL
Qty: 90 TABLET | Refills: 2 | Status: SHIPPED | OUTPATIENT
Start: 2025-03-17

## 2025-03-17 NOTE — TELEPHONE ENCOUNTER
Refill Decision Note   Jessica Nelson  is requesting a refill authorization.  Brief Assessment and Rationale for Refill:  Approve     Medication Therapy Plan:         Comments:     Note composed:3:30 PM 03/17/2025

## 2025-03-17 NOTE — TELEPHONE ENCOUNTER
No care due was identified.  Health [facility] Embedded Care Due Messages. Reference number: [id number].   3/17/2025 10:09:01 AM CDT

## 2025-03-29 ENCOUNTER — HOSPITAL ENCOUNTER (OUTPATIENT)
Dept: RADIOLOGY | Facility: HOSPITAL | Age: 55
Discharge: HOME | End: 2025-03-29
Attending: INTERNAL MEDICINE
Payer: COMMERCIAL

## 2025-03-29 DIAGNOSIS — Z12.31 OTHER SCREENING MAMMOGRAM: ICD-10-CM

## 2025-03-29 PROCEDURE — 77067 SCR MAMMO BI INCL CAD: CPT | Mod: TC

## 2025-03-29 PROCEDURE — 77063 BREAST TOMOSYNTHESIS BI: CPT | Mod: 26,,, | Performed by: RADIOLOGY

## 2025-03-29 PROCEDURE — 77067 SCR MAMMO BI INCL CAD: CPT | Mod: 26,,, | Performed by: RADIOLOGY

## 2025-03-31 ENCOUNTER — RESULTS FOLLOW-UP (OUTPATIENT)
Dept: PRIMARY CARE CLINIC | Facility: CLINIC | Age: 55
End: 2025-03-31

## 2025-06-12 ENCOUNTER — OFFICE VISIT (OUTPATIENT)
Dept: OBSTETRICS AND GYNECOLOGY | Facility: CLINIC | Age: 55
End: 2025-06-12
Payer: COMMERCIAL

## 2025-06-12 VITALS
WEIGHT: 143.75 LBS | BODY MASS INDEX: 20.62 KG/M2 | SYSTOLIC BLOOD PRESSURE: 118 MMHG | DIASTOLIC BLOOD PRESSURE: 76 MMHG

## 2025-06-12 DIAGNOSIS — Z78.0 POSTMENOPAUSAL: ICD-10-CM

## 2025-06-12 DIAGNOSIS — Z12.4 CERVICAL CANCER SCREENING: Primary | ICD-10-CM

## 2025-06-12 DIAGNOSIS — N95.2 VAGINAL ATROPHY: ICD-10-CM

## 2025-06-12 PROCEDURE — 99999 PR PBB SHADOW E&M-EST. PATIENT-LVL III: CPT | Mod: PBBFAC,,, | Performed by: OBSTETRICS & GYNECOLOGY

## 2025-06-12 RX ORDER — PRASTERONE 6.5 MG/1
6.5 INSERT VAGINAL DAILY
Qty: 30 EACH | Refills: 12 | Status: SHIPPED | OUTPATIENT
Start: 2025-06-12 | End: 2025-07-10

## 2025-06-12 NOTE — PROGRESS NOTES
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    NO PMB.    She is using intrarosa for vaginal atrophy and doing well with seldom use as needed  KEIRA- she is doing Kegels      Past Medical History:   Diagnosis Date    Alopecia areata 1990    Resolved. Possibly secondary to CMV    Benign essential HTN 2015    Bilateral retinal lattice degeneration     Depression 2012    Dislocated thumb, right, sequela 2024    Essential hypertension 2015    Nodular fasciitis 2005    LUE    Pain of right thumb 2024    Rupture of UCL of right thumb 2024    Subclinical hypothyroidism     Venous insufficiency        Past Surgical History:   Procedure Laterality Date    COLONOSCOPY N/A 2020    Procedure: COLONOSCOPY;  Surgeon: Ida Donaldson MD;  Location: [address]);  Service: Endoscopy;  Laterality: N/A;  Per JOVANY Sorto, Pt having constipation and LLQ abdominal pain, diagnostic colonoscopy- ERW20@0934  COVID test on 20 at [facility]    Maxillofacial Surgery  1986    Malocclusion    Nodule Excision      Benign Nodular Fasciitis LUE    REPAIR OF COLLATERAL LIGAMENT OF THUMB Right 2024    Procedure: RIGHT THUMB UCL REPAIR;  Surgeon: Megan Staples MD;  Location: [facility];  Service: Orthopedics;  Laterality: Right;  REGIONAL/MAC    RETINAL LASER PROCEDURE      Lattice Degeneration Retina    TONSILLECTOMY         OB History    Para Term  AB Living   2 2 2   4   SAB IAB Ectopic Multiple Live Births       2      # Outcome Date GA Lbr Allen/2nd Weight Sex Type Anes PTL Lv   2 Term 06    F Vag-Spont  N DUNIA   1 Term 03    M Vag-Spont  N DUNIA       Family History   Problem Relation Name Age of Onset    Hypertension Father      Hyperlipidemia Father      Hypertension Mother      Hyperlipidemia Mother      Breast cancer Mother  67    Ovarian cancer Paternal Aunt       Thyroid disease Neg Hx      Amblyopia Neg Hx      Blindness Neg Hx      Cancer Neg Hx      Cataracts Neg Hx      Diabetes Neg Hx      Glaucoma Neg Hx      Macular degeneration Neg Hx      Retinal detachment Neg Hx      Strabismus Neg Hx      Stroke Neg Hx         Social History[1]    /76 (Patient Position: Sitting)   Wt 65.2 kg (143 lb 11.8 oz)   LMP 11/04/2019   BMI 20.62 kg/m²     ROS:  GENERAL: Denies weight gain or weight loss. Feeling well overall.   SKIN: Denies rash or lesions.   HEAD: Denies head injury or headache.   NODES: Denies enlarged lymph nodes.   CHEST: Denies chest pain or shortness of breath.   CARDIOVASCULAR: Denies palpitations or left sided chest pain.   ABDOMEN: No abdominal pain, constipation, diarrhea, nausea, vomiting or rectal bleeding.   URINARY: No frequency, dysuria, hematuria, or burning on urination.  REPRODUCTIVE: See HPI.   BREASTS: The patient performs breast self-examination and denies pain, lumps, or nipple discharge.   HEMATOLOGIC: No easy bruisability or excessive bleeding.  MUSCULOSKELETAL: Denies joint pain or swelling.   NEUROLOGIC: Denies syncope or weakness.   PSYCHIATRIC: Denies depression, anxiety or mood swings.    Physical Exam:    APPEARANCE: Well nourished, well developed, in no acute distress.  AFFECT: WNL, alert and oriented x 3  SKIN: No acne or hirsutism  NECK: Neck symmetric without masses or thyromegaly  NODES: No inguinal, cervical, axillary, or femoral lymph node enlargement  CHEST: Good respiratory effect  ABDOMEN: Soft.  No tenderness or masses.  No hepatosplenomegaly.  No hernias.  BREASTS: Symmetrical, no skin changes or visible lesions.  No palpable masses, nipple discharge bilaterally.  PELVIC: Normal external genitalia without lesions.  Normal hair distribution.  Adequate perineal body, normal urethral meatus.  Vagina moist and well rugated without lesions or discharge.  Cervix pink, without lesions, discharge or tenderness.  No significant  cystocele or rectocele.  Bimanual exam shows uterus to be normal size, regular, mobile and nontender.  Adnexa without masses or tenderness.    EXTREMITIES: No edema.    ASSESSMENT AND PLAN  1. Cervical cancer screening  Liquid-Based Pap Smear, Screening      2. Postmenopausal  prasterone, DHEA, (INTRAROSA) 6.5 mg Inst      3. Vaginal atrophy  prasterone, DHEA, (INTRAROSA) 6.5 mg Inst        MMG done  and bone density scheduled  Revaree OTC with Carmen    Patient was counseled today on A.C.S. Pap guidelines and recommendations for yearly pelvic exams, mammograms and monthly self breast exams; to see her PCP for other health maintenance.     Follow up if symptoms worsen or fail to improve.           [1]   Social History  Tobacco Use    Smoking status: Never    Smokeless tobacco: Never   Substance Use Topics    Alcohol use: Yes     Comment: Rare    Drug use: No

## 2025-06-21 ENCOUNTER — RESULTS FOLLOW-UP (OUTPATIENT)
Dept: OBSTETRICS AND GYNECOLOGY | Facility: CLINIC | Age: 55
End: 2025-06-21

## 2025-06-21 ENCOUNTER — PATIENT MESSAGE (OUTPATIENT)
Dept: OBSTETRICS AND GYNECOLOGY | Facility: CLINIC | Age: 55
End: 2025-06-21
Payer: COMMERCIAL

## 2025-06-23 ENCOUNTER — TELEPHONE (OUTPATIENT)
Dept: OBSTETRICS AND GYNECOLOGY | Facility: CLINIC | Age: 55
End: 2025-06-23
Payer: COMMERCIAL

## 2025-06-23 NOTE — TELEPHONE ENCOUNTER
----- Message from Malgorzata Westbrook MD sent at 6/21/2025 12:10 PM CDT -----  Test results are normal. Please inform patient with results.  ----- Message -----  From: Lab, Background User  Sent: 6/17/2025   3:16 PM CDT  To: Malgorzata Westbrook MD

## 2025-06-24 DIAGNOSIS — Z78.0 POSTMENOPAUSAL: ICD-10-CM

## 2025-06-24 DIAGNOSIS — N95.2 VAGINAL ATROPHY: ICD-10-CM

## 2025-06-25 RX ORDER — PRASTERONE 6.5 MG/1
6.5 INSERT VAGINAL DAILY
Qty: 30 EACH | Refills: 12 | Status: SHIPPED | OUTPATIENT
Start: 2025-06-25 | End: 2025-07-23

## 2025-07-20 DIAGNOSIS — I10 ESSENTIAL HYPERTENSION: ICD-10-CM

## 2025-07-20 RX ORDER — CARVEDILOL 3.12 MG/1
3.12 TABLET ORAL 2 TIMES DAILY WITH MEALS
Qty: 180 TABLET | Refills: 1 | Status: SHIPPED | OUTPATIENT
Start: 2025-07-20

## 2025-07-20 NOTE — TELEPHONE ENCOUNTER
No care due was identified.  Health [facility] Embedded Care Due Messages. Reference number: [id number].   7/20/2025 1:00:21 PM CDT

## 2025-07-21 NOTE — TELEPHONE ENCOUNTER
Refill Decision Note   Jessica Nelson  is requesting a refill authorization.  Brief Assessment and Rationale for Refill:  Approve     Medication Therapy Plan:         Comments:     Note composed:10:12 PM 07/20/2025

## 2025-08-06 ENCOUNTER — HOSPITAL ENCOUNTER (OUTPATIENT)
Dept: RADIOLOGY | Facility: CLINIC | Age: 55
Discharge: HOME | End: 2025-08-06
Attending: INTERNAL MEDICINE
Payer: COMMERCIAL

## 2025-08-06 DIAGNOSIS — M85.80 OSTEOPENIA, UNSPECIFIED LOCATION: ICD-10-CM

## 2025-08-06 PROCEDURE — 77080 DXA BONE DENSITY AXIAL: CPT | Mod: 26,,, | Performed by: INTERNAL MEDICINE

## 2025-08-06 PROCEDURE — 77080 DXA BONE DENSITY AXIAL: CPT | Mod: TC

## 2025-09-01 ENCOUNTER — PATIENT MESSAGE (OUTPATIENT)
Dept: PRIMARY CARE CLINIC | Facility: CLINIC | Age: 55
End: 2025-09-01
Payer: COMMERCIAL

## 2025-09-01 DIAGNOSIS — Z00.00 PREVENTATIVE HEALTH CARE: ICD-10-CM

## 2025-09-01 DIAGNOSIS — E03.9 HYPOTHYROIDISM, UNSPECIFIED TYPE: Primary | ICD-10-CM

## 2025-09-04 ENCOUNTER — OFFICE VISIT (OUTPATIENT)
Dept: PRIMARY CARE CLINIC | Facility: CLINIC | Age: 55
End: 2025-09-04
Payer: COMMERCIAL

## 2025-09-04 VITALS
HEIGHT: 70 IN | BODY MASS INDEX: 20.36 KG/M2 | WEIGHT: 142.19 LBS | SYSTOLIC BLOOD PRESSURE: 132 MMHG | HEART RATE: 54 BPM | OXYGEN SATURATION: 99 % | DIASTOLIC BLOOD PRESSURE: 74 MMHG

## 2025-09-04 DIAGNOSIS — M85.89 OSTEOPENIA OF MULTIPLE SITES: ICD-10-CM

## 2025-09-04 DIAGNOSIS — E03.9 HYPOTHYROIDISM, UNSPECIFIED TYPE: ICD-10-CM

## 2025-09-04 DIAGNOSIS — R06.83 SNORING: ICD-10-CM

## 2025-09-04 DIAGNOSIS — F41.1 GAD (GENERALIZED ANXIETY DISORDER): ICD-10-CM

## 2025-09-04 DIAGNOSIS — Z00.00 PREVENTATIVE HEALTH CARE: Primary | ICD-10-CM

## 2025-09-04 DIAGNOSIS — R49.0 HOARSENESS: ICD-10-CM

## 2025-09-04 DIAGNOSIS — K21.9 GASTROESOPHAGEAL REFLUX DISEASE, UNSPECIFIED WHETHER ESOPHAGITIS PRESENT: ICD-10-CM

## 2025-09-04 DIAGNOSIS — J30.1 SEASONAL ALLERGIC RHINITIS DUE TO POLLEN: ICD-10-CM

## 2025-09-04 DIAGNOSIS — H35.413 BILATERAL RETINAL LATTICE DEGENERATION: ICD-10-CM

## 2025-09-04 PROCEDURE — 3078F DIAST BP <80 MM HG: CPT | Mod: CPTII,S$GLB,, | Performed by: INTERNAL MEDICINE

## 2025-09-04 PROCEDURE — 99999 PR PBB SHADOW E&M-EST. PATIENT-LVL IV: CPT | Mod: PBBFAC,,, | Performed by: INTERNAL MEDICINE

## 2025-09-04 PROCEDURE — 3008F BODY MASS INDEX DOCD: CPT | Mod: CPTII,S$GLB,, | Performed by: INTERNAL MEDICINE

## 2025-09-04 PROCEDURE — 3075F SYST BP GE 130 - 139MM HG: CPT | Mod: CPTII,S$GLB,, | Performed by: INTERNAL MEDICINE

## 2025-09-04 PROCEDURE — 1159F MED LIST DOCD IN RCRD: CPT | Mod: CPTII,S$GLB,, | Performed by: INTERNAL MEDICINE

## 2025-09-04 PROCEDURE — 99396 PREV VISIT EST AGE 40-64: CPT | Mod: S$GLB,,, | Performed by: INTERNAL MEDICINE

## 2025-09-04 PROCEDURE — 3044F HG A1C LEVEL LT 7.0%: CPT | Mod: CPTII,S$GLB,, | Performed by: INTERNAL MEDICINE

## 2025-09-04 RX ORDER — PANTOPRAZOLE SODIUM 20 MG/1
20 TABLET, DELAYED RELEASE ORAL DAILY
Qty: 90 TABLET | Refills: 3 | Status: SHIPPED | OUTPATIENT
Start: 2025-09-04 | End: 2026-09-04

## 2025-09-04 RX ORDER — AZELASTINE 1 MG/ML
1 SPRAY, METERED NASAL 2 TIMES DAILY
Qty: 90 ML | Refills: 3 | Status: SHIPPED | OUTPATIENT
Start: 2025-09-04 | End: 2026-09-04

## (undated) DEVICE — SUT MONOCRYL PLUS 4-0 P3

## (undated) DEVICE — SOL NACL IRR 1000ML BTL

## (undated) DEVICE — SPONGE COTTON TRAY 4X4IN

## (undated) DEVICE — SUT 4-0 VICRYL / P-3

## (undated) DEVICE — CORD FOR BIPOLAR FORCEPS 12

## (undated) DEVICE — GLOVE BIOGEL SKINSENSE PI 7.0

## (undated) DEVICE — Device

## (undated) DEVICE — DRESSING N ADH OIL EMUL 3X3

## (undated) DEVICE — PLASTER SPLNT FST SET 4X15 BLU

## (undated) DEVICE — SUT FIBERWIRE 4-0 18 IN TAP

## (undated) DEVICE — SCRUB 10% POVIDONE IODINE 4OZ

## (undated) DEVICE — TOURNIQUET SB QC DP 18X4IN

## (undated) DEVICE — BUCKET PLASTER DISPOSABLE

## (undated) DEVICE — GLOVE BIOGEL PI MICRO SZ 7

## (undated) DEVICE — SOL IRR SOD CHL .9% POUR

## (undated) DEVICE — SLING ARM LARGE FOAM STRAP

## (undated) DEVICE — ADHESIVE DERMABOND ADVANCED

## (undated) DEVICE — SLING ARM MEDIUM FOAM STRAP

## (undated) DEVICE — BANDAGE MATRIX HK LOOP 2IN 5YD

## (undated) DEVICE — COVER CAMERA OPERATING ROOM

## (undated) DEVICE — PAD CAST SPECIALIST STRL 4

## (undated) DEVICE — BANDAGE MATRIX HK LOOP 4IN 5YD

## (undated) DEVICE — PAD CAST SPECIALIST 2X4

## (undated) DEVICE — DRAPE SURG W/TWL 17 5/8X23

## (undated) DEVICE — COVER LIGHT HANDLE 80/CA